# Patient Record
Sex: FEMALE | Race: WHITE | NOT HISPANIC OR LATINO | Employment: OTHER | URBAN - METROPOLITAN AREA
[De-identification: names, ages, dates, MRNs, and addresses within clinical notes are randomized per-mention and may not be internally consistent; named-entity substitution may affect disease eponyms.]

---

## 2022-07-17 LAB — VITAMIN D 25-HYDROXY: 16 NG/ML

## 2023-02-21 LAB — URINE CULTURE: NORMAL

## 2023-03-03 PROBLEM — N39.0 UTI (URINARY TRACT INFECTION): Status: ACTIVE | Noted: 2023-03-03

## 2023-03-03 PROBLEM — R09.89 BILATERAL CAROTID BRUITS: Status: ACTIVE | Noted: 2023-03-03

## 2023-03-03 PROBLEM — E04.1 THYROID NODULE: Status: ACTIVE | Noted: 2023-03-03

## 2023-03-03 PROBLEM — Z95.2 S/P AVR: Status: ACTIVE | Noted: 2023-03-03

## 2023-03-03 PROBLEM — G45.3 AMAUROSIS FUGAX: Status: ACTIVE | Noted: 2023-03-03

## 2023-03-03 PROBLEM — I73.9 PERIPHERAL VASCULAR DISEASE (CMS-HCC): Status: ACTIVE | Noted: 2023-03-03

## 2023-03-03 PROBLEM — I25.10 2-VESSEL CORONARY ARTERY DISEASE: Status: ACTIVE | Noted: 2023-03-03

## 2023-03-03 PROBLEM — E03.4 HYPOTHYROIDISM DUE TO ACQUIRED ATROPHY OF THYROID: Status: ACTIVE | Noted: 2023-03-03

## 2023-03-03 PROBLEM — I10 ESSENTIAL HYPERTENSION: Status: ACTIVE | Noted: 2023-03-03

## 2023-03-03 PROBLEM — I35.0 AORTIC VALVE STENOSIS: Status: ACTIVE | Noted: 2023-03-03

## 2023-03-03 PROBLEM — R73.9 HYPERGLYCEMIA: Status: ACTIVE | Noted: 2023-03-03

## 2023-03-03 PROBLEM — S72.001A CLOSED FRACTURE OF RIGHT HIP (MULTI): Status: ACTIVE | Noted: 2023-03-03

## 2023-03-03 PROBLEM — I48.0 PAROXYSMAL ATRIAL FIBRILLATION (MULTI): Status: ACTIVE | Noted: 2023-03-03

## 2023-03-03 PROBLEM — M75.30 CALCIFIC SUPRASPINATUS TENDINITIS: Status: ACTIVE | Noted: 2023-03-03

## 2023-03-03 PROBLEM — I65.21 ASYMPTOMATIC STENOSIS OF RIGHT CAROTID ARTERY: Status: ACTIVE | Noted: 2023-03-03

## 2023-03-03 PROBLEM — I20.9 ANGINA, CLASS IV (CMS-HCC): Status: ACTIVE | Noted: 2023-03-03

## 2023-03-03 PROBLEM — M12.811 ROTATOR CUFF ARTHROPATHY OF RIGHT SHOULDER: Status: ACTIVE | Noted: 2023-03-03

## 2023-03-03 PROBLEM — D64.9 ANEMIA, NORMOCYTIC NORMOCHROMIC: Status: ACTIVE | Noted: 2023-03-03

## 2023-03-03 PROBLEM — E55.9 VITAMIN D DEFICIENCY: Status: ACTIVE | Noted: 2023-03-03

## 2023-03-03 PROBLEM — R53.81 DEBILITY: Status: ACTIVE | Noted: 2023-03-03

## 2023-03-03 PROBLEM — Z95.1 S/P CABG (CORONARY ARTERY BYPASS GRAFT): Status: ACTIVE | Noted: 2023-03-03

## 2023-03-03 PROBLEM — R27.0 ATAXIA: Status: ACTIVE | Noted: 2023-03-03

## 2023-03-03 PROBLEM — R41.3 MEMORY LOSS: Status: ACTIVE | Noted: 2023-03-03

## 2023-03-03 PROBLEM — N18.31 STAGE 3A CHRONIC KIDNEY DISEASE (MULTI): Status: ACTIVE | Noted: 2023-03-03

## 2023-03-03 PROBLEM — I35.1 AORTIC VALVE REGURGITATION, NONRHEUMATIC: Status: ACTIVE | Noted: 2023-03-03

## 2023-03-03 PROBLEM — E78.2 MIXED HYPERLIPIDEMIA: Status: ACTIVE | Noted: 2023-03-03

## 2023-03-03 PROBLEM — N95.2 ATROPHY OF VAGINA: Status: ACTIVE | Noted: 2023-03-03

## 2023-03-03 RX ORDER — ATORVASTATIN CALCIUM 40 MG/1
1 TABLET, FILM COATED ORAL DAILY
COMMUNITY
Start: 2022-02-28 | End: 2023-10-30 | Stop reason: SDUPTHER

## 2023-03-03 RX ORDER — ASPIRIN 81 MG/1
1 TABLET ORAL DAILY
COMMUNITY
End: 2024-05-17 | Stop reason: WASHOUT

## 2023-03-03 RX ORDER — NITROGLYCERIN 0.4 MG/1
TABLET SUBLINGUAL
COMMUNITY
Start: 2014-08-22

## 2023-03-03 RX ORDER — AMLODIPINE BESYLATE 10 MG/1
1 TABLET ORAL DAILY
COMMUNITY
End: 2024-05-17 | Stop reason: SDUPTHER

## 2023-03-03 RX ORDER — METOPROLOL SUCCINATE 50 MG/1
1 TABLET, EXTENDED RELEASE ORAL 2 TIMES DAILY
COMMUNITY
End: 2023-10-30 | Stop reason: SDUPTHER

## 2023-03-03 RX ORDER — LOSARTAN POTASSIUM AND HYDROCHLOROTHIAZIDE 25; 100 MG/1; MG/1
1 TABLET ORAL DAILY
COMMUNITY

## 2023-03-03 RX ORDER — WARFARIN 2.5 MG/1
TABLET ORAL
COMMUNITY
End: 2023-12-08

## 2023-03-03 RX ORDER — LEVOTHYROXINE SODIUM 75 UG/1
1 TABLET ORAL DAILY
COMMUNITY
End: 2023-06-30 | Stop reason: ALTCHOICE

## 2023-03-10 ENCOUNTER — OFFICE VISIT (OUTPATIENT)
Dept: PRIMARY CARE | Facility: CLINIC | Age: 88
End: 2023-03-10
Payer: COMMERCIAL

## 2023-03-10 VITALS
HEART RATE: 62 BPM | RESPIRATION RATE: 20 BRPM | DIASTOLIC BLOOD PRESSURE: 76 MMHG | SYSTOLIC BLOOD PRESSURE: 140 MMHG | HEIGHT: 62 IN | OXYGEN SATURATION: 99 % | WEIGHT: 158 LBS | BODY MASS INDEX: 29.08 KG/M2

## 2023-03-10 DIAGNOSIS — F05 SUNDOWNING: ICD-10-CM

## 2023-03-10 DIAGNOSIS — R41.89 COGNITIVE DECLINE: Primary | ICD-10-CM

## 2023-03-10 DIAGNOSIS — N30.00 ACUTE CYSTITIS WITHOUT HEMATURIA: ICD-10-CM

## 2023-03-10 DIAGNOSIS — D50.9 IRON DEFICIENCY ANEMIA, UNSPECIFIED IRON DEFICIENCY ANEMIA TYPE: ICD-10-CM

## 2023-03-10 DIAGNOSIS — N18.31 CHRONIC KIDNEY DISEASE, STAGE 3A (MULTI): ICD-10-CM

## 2023-03-10 DIAGNOSIS — E03.4 HYPOTHYROIDISM DUE TO ACQUIRED ATROPHY OF THYROID: ICD-10-CM

## 2023-03-10 DIAGNOSIS — R41.0 DISORIENTATION: ICD-10-CM

## 2023-03-10 PROCEDURE — 1159F MED LIST DOCD IN RCRD: CPT | Performed by: INTERNAL MEDICINE

## 2023-03-10 PROCEDURE — 99214 OFFICE O/P EST MOD 30 MIN: CPT | Performed by: INTERNAL MEDICINE

## 2023-03-10 PROCEDURE — 3077F SYST BP >= 140 MM HG: CPT | Performed by: INTERNAL MEDICINE

## 2023-03-10 PROCEDURE — 3078F DIAST BP <80 MM HG: CPT | Performed by: INTERNAL MEDICINE

## 2023-03-10 PROCEDURE — 1036F TOBACCO NON-USER: CPT | Performed by: INTERNAL MEDICINE

## 2023-03-10 PROCEDURE — 1157F ADVNC CARE PLAN IN RCRD: CPT | Performed by: INTERNAL MEDICINE

## 2023-03-10 RX ORDER — DONEPEZIL HYDROCHLORIDE 5 MG/1
5 TABLET, FILM COATED ORAL NIGHTLY
Qty: 30 TABLET | Refills: 5 | Status: SHIPPED | OUTPATIENT
Start: 2023-03-10 | End: 2023-04-24 | Stop reason: SINTOL

## 2023-03-10 RX ORDER — SERTRALINE HYDROCHLORIDE 25 MG/1
25 TABLET, FILM COATED ORAL DAILY
Qty: 30 TABLET | Refills: 1 | Status: SHIPPED | OUTPATIENT
Start: 2023-03-10 | End: 2023-04-24 | Stop reason: SDUPTHER

## 2023-03-10 NOTE — PATIENT INSTRUCTIONS
Thank you very much for coming.  I am very happy to see you.    Thank you very much for having your laboratory examinations done.  Everything came back stable.  Your urine was suggestive of a urinary tract infection, but since you have had no symptoms, we will just reevaluate you when you return in 6 weeks.  In the meantime, please drink lots of fluids throughout the day, and please urinate every 4 hours while awake.    Likewise, I am happy to hear that you have continued to try to drink more and more fluids!  Please continue your efforts!    You do have a mild ANEMIA perhaps, but it is very mild, and likely has something to do with iron stores being low.  I will recheck again in 6 weeks.  Call me sooner if you notice any blood in your urine or your stool, or if you bruise easily, or if you are having gum or nose bleeding.    I do understand that from time to time, you become a little forgetful, and it may take 2 or 3 days before you remember what you are trying to say.  STILL, you have very good insight as to what is happening, and you are being very PATIENT with yourself.      The next best step is to put you on some medicine to protect your memory!  SERTRALINE will be a very good starting medication for you.  25 mg with SUPPER every evening.  At this dose, there should be no side effects.    Although also, we will get you started on ARICEPT/DONEPEZIL, which is the classic medication for Alzheimer type memory loss.  Please take a 5 mg tablet at bedtime.  It may cause sleepiness, and that is why you will take it at this time.    Please come back in 6 weeks.  Please repeat NONFASTING laboratory examinations, then see me soon after.  Until then, please continue to take care of yourself and your family and each other.  Please continue to pray for our recovery from this pandemic.  I hope you have a blessed Lent and a happy Easter!            0  Looking for any reversible causes of cognitive decline.  Without any  identifiable cause, the patient is presumptively treated as Alzheimer type dementia, and although she is not depressive, and even as she has good insight as to how she is declining.  She will benefit from medications.  She also has been very patient with herself, and as a bonus, her son has taken her in to his own home.    Plans as above.  Return in 6 weeks.  40 minutes please.  Repeat NONFASTING laboratory examination, then Medicare Wellness evaluation.  Reassess mood, energy, function, cognition, rule out self-neglect, caregiver stress.  Review preventive strategies, cardiovascular risk.  Consider referral to NEUROLOGY.  Consider anatomical studies.            0

## 2023-03-10 NOTE — PROGRESS NOTES
"Subjective   Patient ID: Shona Everett is a 87 y.o. female who presents for Follow-up (Patient is here follow up.).    HPI   CONSTITUTIONALLY, no fever, no chills.  No night sweats.  No lingering anorexia or nausea.  No apparent lymphadenopathy.  No apparent weight loss.     Review of Systems  Review of systems as in history of present illness, and otherwise, reviewed separately as well, and was unremarkable/negative/noncontributory.         Objective   /76 (BP Location: Left arm, Patient Position: Sitting, BP Cuff Size: Adult)   Pulse 62   Resp 20   Ht 1.575 m (5' 2\")   Wt 71.7 kg (158 lb)   SpO2 99%   BMI 28.90 kg/m²     Physical Exam  HEAD pink palpebral conjunctivae, anicteric sclerae.  NECK supple, no apparent jugular venous distention.  CARDIOVASCULAR not in distress or diaphoresis.  No bipedal edema.  LUNGS not in distress or diaphoresis.  Not using accessory muscles.  ABDOMEN soft, nontender.  BACK no costovertebral angle tenderness.  EXTREMITIES no clubbing, no cyanosis.  NEURO no facial asymmetry.  No apparent cranial nerve deficits.  Romberg negative.  Ambulating without need of assistance.  No apparent focal weakness.  No tremors.  PSYCH receptive, appropriate, and eager to maintain and improve quality of life.     Assessment/Plan   Problem List Items Addressed This Visit          Genitourinary    UTI (urinary tract infection)    Relevant Orders    Urinalysis with Reflex Microscopic and Culture       Endocrine/Metabolic    Hypothyroidism due to acquired atrophy of thyroid    Relevant Orders    TSH with reflex to Free T4 if abnormal     Other Visit Diagnoses       Cognitive decline    -  Primary    Relevant Medications    sertraline (Zoloft) 25 mg tablet    donepezil (Aricept) 5 mg tablet    Disorientation        Relevant Medications    sertraline (Zoloft) 25 mg tablet    donepezil (Aricept) 5 mg tablet    Sundowning        Relevant Medications    sertraline (Zoloft) 25 mg tablet    donepezil " (Aricept) 5 mg tablet    Iron deficiency anemia, unspecified iron deficiency anemia type        Relevant Orders    CBC and Auto Differential    Chronic kidney disease, stage 3a        Relevant Orders    Comprehensive Metabolic Panel    Creatine Kinase             Patient is here for follow up - lab review.

## 2023-03-13 ENCOUNTER — APPOINTMENT (OUTPATIENT)
Dept: PRIMARY CARE | Facility: CLINIC | Age: 88
End: 2023-03-13
Payer: COMMERCIAL

## 2023-04-10 DIAGNOSIS — N39.0 RECURRENT UTI: Primary | ICD-10-CM

## 2023-04-11 LAB
INR IN PPP BY COAGULATION ASSAY: 1.8 (ref 0.9–1.1)
PROTHROMBIN TIME (PT) IN PPP BY COAGULATION ASSAY: 21.1 SEC (ref 9.8–13.4)

## 2023-04-12 ENCOUNTER — LAB (OUTPATIENT)
Dept: LAB | Facility: LAB | Age: 88
End: 2023-04-12
Payer: COMMERCIAL

## 2023-04-12 ENCOUNTER — TELEPHONE (OUTPATIENT)
Dept: PRIMARY CARE | Facility: CLINIC | Age: 88
End: 2023-04-12

## 2023-04-12 DIAGNOSIS — N39.0 RECURRENT UTI: ICD-10-CM

## 2023-04-12 DIAGNOSIS — N30.00 ACUTE CYSTITIS WITHOUT HEMATURIA: ICD-10-CM

## 2023-04-12 LAB
APPEARANCE, URINE: ABNORMAL
BILIRUBIN, URINE: NEGATIVE
BLOOD, URINE: NEGATIVE
COLOR, URINE: YELLOW
GLUCOSE, URINE: NEGATIVE MG/DL
HYALINE CASTS, URINE: ABNORMAL /LPF
KETONES, URINE: NEGATIVE MG/DL
LEUKOCYTE ESTERASE, URINE: ABNORMAL
MUCUS, URINE: ABNORMAL /LPF
NITRITE, URINE: NEGATIVE
PH, URINE: 7 (ref 5–8)
PROTEIN, URINE: NEGATIVE MG/DL
RBC, URINE: 1 /HPF (ref 0–5)
SPECIFIC GRAVITY, URINE: 1.01 (ref 1–1.03)
SQUAMOUS EPITHELIAL CELLS, URINE: 3 /HPF
UROBILINOGEN, URINE: <2 MG/DL (ref 0–1.9)
WBC, URINE: 12 /HPF (ref 0–5)

## 2023-04-12 PROCEDURE — 81001 URINALYSIS AUTO W/SCOPE: CPT

## 2023-04-12 PROCEDURE — 87086 URINE CULTURE/COLONY COUNT: CPT

## 2023-04-14 ENCOUNTER — APPOINTMENT (OUTPATIENT)
Dept: PRIMARY CARE | Facility: CLINIC | Age: 88
End: 2023-04-14
Payer: COMMERCIAL

## 2023-04-15 LAB — URINE CULTURE: NORMAL

## 2023-04-24 ENCOUNTER — OFFICE VISIT (OUTPATIENT)
Dept: PRIMARY CARE | Facility: CLINIC | Age: 88
End: 2023-04-24
Payer: COMMERCIAL

## 2023-04-24 VITALS
RESPIRATION RATE: 20 BRPM | WEIGHT: 152.5 LBS | SYSTOLIC BLOOD PRESSURE: 137 MMHG | DIASTOLIC BLOOD PRESSURE: 68 MMHG | BODY MASS INDEX: 28.06 KG/M2 | HEART RATE: 62 BPM | HEIGHT: 62 IN | OXYGEN SATURATION: 98 %

## 2023-04-24 DIAGNOSIS — E03.4 HYPOTHYROIDISM DUE TO ACQUIRED ATROPHY OF THYROID: ICD-10-CM

## 2023-04-24 DIAGNOSIS — N39.0 RECURRENT UTI: ICD-10-CM

## 2023-04-24 DIAGNOSIS — R73.9 HYPERGLYCEMIA: ICD-10-CM

## 2023-04-24 DIAGNOSIS — I10 ESSENTIAL HYPERTENSION: ICD-10-CM

## 2023-04-24 DIAGNOSIS — R41.89 COGNITIVE DECLINE: ICD-10-CM

## 2023-04-24 DIAGNOSIS — R41.0 DISORIENTATION: ICD-10-CM

## 2023-04-24 DIAGNOSIS — E55.9 VITAMIN D DEFICIENCY: ICD-10-CM

## 2023-04-24 DIAGNOSIS — F05 SUNDOWNING: ICD-10-CM

## 2023-04-24 DIAGNOSIS — D64.9 ANEMIA, NORMOCYTIC NORMOCHROMIC: ICD-10-CM

## 2023-04-24 DIAGNOSIS — Z00.00 ROUTINE GENERAL MEDICAL EXAMINATION AT HEALTH CARE FACILITY: Primary | ICD-10-CM

## 2023-04-24 DIAGNOSIS — R41.0 DELIRIUM: ICD-10-CM

## 2023-04-24 DIAGNOSIS — E78.2 MIXED HYPERLIPIDEMIA: ICD-10-CM

## 2023-04-24 PROCEDURE — 1157F ADVNC CARE PLAN IN RCRD: CPT | Performed by: INTERNAL MEDICINE

## 2023-04-24 PROCEDURE — 1170F FXNL STATUS ASSESSED: CPT | Performed by: INTERNAL MEDICINE

## 2023-04-24 PROCEDURE — 1123F ACP DISCUSS/DSCN MKR DOCD: CPT | Performed by: INTERNAL MEDICINE

## 2023-04-24 PROCEDURE — 1036F TOBACCO NON-USER: CPT | Performed by: INTERNAL MEDICINE

## 2023-04-24 PROCEDURE — 3078F DIAST BP <80 MM HG: CPT | Performed by: INTERNAL MEDICINE

## 2023-04-24 PROCEDURE — G0439 PPPS, SUBSEQ VISIT: HCPCS | Performed by: INTERNAL MEDICINE

## 2023-04-24 PROCEDURE — 1159F MED LIST DOCD IN RCRD: CPT | Performed by: INTERNAL MEDICINE

## 2023-04-24 PROCEDURE — 99214 OFFICE O/P EST MOD 30 MIN: CPT | Performed by: INTERNAL MEDICINE

## 2023-04-24 PROCEDURE — 1160F RVW MEDS BY RX/DR IN RCRD: CPT | Performed by: INTERNAL MEDICINE

## 2023-04-24 PROCEDURE — 3075F SYST BP GE 130 - 139MM HG: CPT | Performed by: INTERNAL MEDICINE

## 2023-04-24 RX ORDER — SERTRALINE HYDROCHLORIDE 25 MG/1
25 TABLET, FILM COATED ORAL DAILY
Qty: 30 TABLET | Refills: 1 | Status: SHIPPED | OUTPATIENT
Start: 2023-04-24 | End: 2023-06-30 | Stop reason: SDUPTHER

## 2023-04-24 ASSESSMENT — ENCOUNTER SYMPTOMS
LOSS OF SENSATION IN FEET: 0
OCCASIONAL FEELINGS OF UNSTEADINESS: 0
DEPRESSION: 0

## 2023-04-24 ASSESSMENT — PATIENT HEALTH QUESTIONNAIRE - PHQ9
1. LITTLE INTEREST OR PLEASURE IN DOING THINGS: NOT AT ALL
2. FEELING DOWN, DEPRESSED OR HOPELESS: NOT AT ALL
SUM OF ALL RESPONSES TO PHQ9 QUESTIONS 1 AND 2: 0

## 2023-04-24 ASSESSMENT — ACTIVITIES OF DAILY LIVING (ADL)
DOING_HOUSEWORK: NEEDS ASSISTANCE
DRESSING: INDEPENDENT
BATHING: INDEPENDENT
MANAGING_FINANCES: TOTAL CARE
GROCERY_SHOPPING: TOTAL CARE
TAKING_MEDICATION: NEEDS ASSISTANCE

## 2023-04-24 NOTE — ADDENDUM NOTE
Addended by: DORINDA GAONA on: 4/24/2023 12:50 PM     Modules accepted: Orders, Level of Service

## 2023-04-24 NOTE — PATIENT INSTRUCTIONS
Thank you very much for coming.  It is indeed very nice to see you.    We did your Medicare Wellness evaluation today, and you are doing very well!  Please continue taking your SERTRALINE/ZOLOFT with SUPPER every evening.  No DONEPEZIL/ARICEPT for now.  We can wait until restarting this medication again.    Please continue taking it only SHORT NAPS during the daytime.  Limit yourself to 20 minutes or less.  This way, your brain can be refreshed, but will not be confused.  When you get your days and nights mixed, sometimes, your memory also gets challenged.    Thank you for staying physically active.  Please continue using your wheeled WALKER.  I am glad to hear that it gives you CONFIDENCE to get around and about!    Please do some FASTING laboratory examinations anytime you are scheduled for your next blood draw.  You can have it done at the [facility] [place].  Let me send word regarding results and possible changes.    Please continue to take care of yourself and your family, and please continue to pray for our recovery from this pandemic.     We reviewed your LIVING WILL wishes, and we will continue to have your SON, Delfino, as your DURABLE POWER OF  for healthcare matters.  This means that whenever there is a decision that needs to be done regarding your health, and if you are unable to answer for yourself, he will be the first person for us to ask!    Also, you have expressed desire for us to resuscitate you with CPR in the event that you are close to death.  Of course, we will continue to also monitor your overall quality of life, and we will continue to discuss your options either with you, or with your son if you are unable to answer.    I hope you had a good Easter, and I do wish you a happy spring and summertime!  See you in 2 months.            0  Return in 2 months.  40 minutes please.  Reassess mood, energy, function, cardiovascular risk.  Coordinate with cardiology.  Reassess preventive strategies,  rule out caregiver stress.  Review fasting laboratory results.            0

## 2023-04-24 NOTE — PROGRESS NOTES
"Subjective   Reason for Visit: Shona Everett is an 87 y.o. female here for a Medicare Wellness visit.     Past Medical, Surgical, and Family History reviewed and updated in chart.    Reviewed all medications by prescribing practitioner or clinical pharmacist (such as prescriptions, OTCs, herbal therapies and supplements) and documented in the medical record.    HPI  The patient is here for Medicare Wellness evaluation, review of preventive strategies.  In the interim, started on SERTRALINE, as well as DONEPEZIL.  Perhaps some acute paradoxical response, with the patient having increased confusion, unable to recognize people that she has been exposed to for over 60 years, including her son.  Donepezil stopped, and sertraline continued.  The patient has since returned to baseline, and even has had less daytime sleepiness, and has been taking less naps during the daytime.    The patient herself is not concerned.  No headache, blurred vision, diplopia.  No dysphagia.  Ambulating with the use of a wheeled walker, and the patient herself is very happy about how she is doing.  No recent falls.  Improved left shoulder function.  Continues to try to do physical therapy, but does not always remember her regimens.  Still, denies depression, stating, \"I got people that love me.  They make me feel part of the family.  I can take on visitors.  I feel like I am just at home.\"  Indeed, the patient feels that she has the benefit of company to make sure that she stays safe and healthy, but at the same time, she is allowed the independence that she is used to.    States that she uses her wheeled walker \"all the time... It gives me freedom that I can do anything.\"  Compliant with medications, tolerating regimens.  Denies chest pain, palpitations, orthopnea, paroxysmal nocturnal dyspnea.  Stays physically active.  Careful about exposure.  No coughing, no sputum production.  Appetite preserved, no abdominal distress.  Currently, no " "dysuria, flank, suprapubic pain.  Likewise, no gum or nose bleeding.  No easy bruisability.  No apparent blood in urine or stool.  CONSTITUTIONALLY, no fever, no chills.  No night sweats.  No lingering anorexia or nausea.  No apparent lymphadenopathy.  No apparent weight loss.    CODE STATUS reviewed, please see.    Patient Care Team:  Mike Ashford MD as PCP - General         Review of Systems  Review of systems as in history of present illness, and otherwise, reviewed separately as well, and was unremarkable/negative/noncontributory.      Objective   Vitals:  /68 (BP Location: Left arm, Patient Position: Sitting, BP Cuff Size: Adult)   Pulse 62   Resp 20   Ht 1.575 m (5' 2\")   Wt 69.2 kg (152 lb 8 oz)   SpO2 98%   BMI 27.89 kg/m²       Physical Exam  In seemingly good spirits.  Not in distress or diaphoresis.  Receptive, oriented to person and place.  Not showing signs of frustration.  Moderately built, not cachectic.  Not unkempt.  Appropriate.  Continues to want to stay healthy, independent, and productive.  Does not wish harm to self or others.    HEAD pink palpebral conjunctivae, anicteric sclerae.  NECK supple, no apparent jugular venous distention.  CARDIOVASCULAR not in distress or diaphoresis.  No bipedal edema.  LUNGS not in distress or diaphoresis.  Not using accessory muscles.  ABDOMEN soft, nontender.  BACK no costovertebral angle tenderness.  EXTREMITIES no clubbing, no cyanosis.  SKIN with no breakdown, bleeding, jaundice.  NEURO no facial asymmetry.  No apparent cranial nerve deficits.  Ambulating with the use of a wheeled walker, and is very confident about her position in space.  No apparent focal weakness.  No tremors.  PSYCH receptive, appropriate, and eager to maintain and improve quality of life.         Assessment/Plan   Problem List Items Addressed This Visit          Nervous    Delirium       Circulatory    Essential hypertension    Relevant Orders    Magnesium    TSH " with reflex to Free T4 if abnormal       Genitourinary    Recurrent UTI    Relevant Orders    CBC and Auto Differential    Urinalysis with Reflex Microscopic and Culture       Endocrine/Metabolic    Vitamin D deficiency    Hypothyroidism due to acquired atrophy of thyroid       Hematologic    Anemia, normocytic normochromic    Relevant Orders    Vitamin B12    Folate    Ferritin    Iron and TIBC    Urinalysis with Reflex Microscopic and Culture       Other    Mixed hyperlipidemia    Relevant Orders    Comprehensive Metabolic Panel    Lipid Panel    Hyperglycemia    Relevant Orders    Hemoglobin A1C    Cognitive decline    Relevant Medications    sertraline (Zoloft) 25 mg tablet     Other Visit Diagnoses       Routine general medical examination at health care facility    -  Primary    Disorientation        Relevant Medications    sertraline (Zoloft) 25 mg tablet    Sundowning        Relevant Medications    sertraline (Zoloft) 25 mg tablet             Thank you very much for coming.  It is indeed very nice to see you.    We did your Medicare Wellness evaluation today, and you are doing very well!  Please continue taking your SERTRALINE/ZOLOFT with SUPPER every evening.  No DONEPEZIL/ARICEPT for now.  We can wait until restarting this medication again.    Please continue taking it only SHORT NAPS during the daytime.  Limit yourself to 20 minutes or less.  This way, your brain can be refreshed, but will not be confused.  When you get your days and nights mixed, sometimes, your memory also gets challenged.    Thank you for staying physically active.  Please continue using your wheeled WALKER.  I am glad to hear that it gives you CONFIDENCE to get around and about!    Please do some FASTING laboratory examinations anytime you are scheduled for your next blood draw.  [facility] can have [facility] done at the [facility].  Let me send word regarding results and possible changes.    Please continue to take care of yourself and your  family, and please continue to pray for our recovery from this pandemic.     We reviewed your LIVING WILL wishes, and we will continue to have your SON, Delfino, as your DURABLE POWER OF  for healthcare matters.  This means that whenever there is a decision that needs to be done regarding your health, and if you are unable to answer for yourself, he will be the first person for us to ask!    Also, you have expressed desire for us to resuscitate you with CPR in the event that you are close to death.  Of course, we will continue to also monitor your overall quality of life, and we will continue to discuss your options either with you, or with your son if you are unable to answer.    I hope you had a good Easter, and I do wish you a happy spring and summertime!  See you in 2 months.            0  Return in 2 months.  40 minutes please.  Reassess mood, energy, function, cardiovascular risk.  Coordinate with cardiology.  Reassess preventive strategies, rule out caregiver stress.  Review fasting laboratory results.            0

## 2023-06-24 ENCOUNTER — LAB (OUTPATIENT)
Dept: LAB | Facility: LAB | Age: 88
End: 2023-06-24
Payer: COMMERCIAL

## 2023-06-24 DIAGNOSIS — N39.0 RECURRENT UTI: ICD-10-CM

## 2023-06-24 DIAGNOSIS — E78.2 MIXED HYPERLIPIDEMIA: ICD-10-CM

## 2023-06-24 DIAGNOSIS — R73.9 HYPERGLYCEMIA: ICD-10-CM

## 2023-06-24 DIAGNOSIS — D64.9 ANEMIA, NORMOCYTIC NORMOCHROMIC: ICD-10-CM

## 2023-06-24 DIAGNOSIS — I10 ESSENTIAL HYPERTENSION: ICD-10-CM

## 2023-06-24 LAB
ALANINE AMINOTRANSFERASE (SGPT) (U/L) IN SER/PLAS: 14 U/L (ref 7–45)
ALBUMIN (G/DL) IN SER/PLAS: 4.1 G/DL (ref 3.4–5)
ALKALINE PHOSPHATASE (U/L) IN SER/PLAS: 55 U/L (ref 33–136)
ANION GAP IN SER/PLAS: 14 MMOL/L (ref 10–20)
APPEARANCE, URINE: CLEAR
ASPARTATE AMINOTRANSFERASE (SGOT) (U/L) IN SER/PLAS: 20 U/L (ref 9–39)
BACTERIA, URINE: ABNORMAL /HPF
BASOPHILS (10*3/UL) IN BLOOD BY AUTOMATED COUNT: 0.07 X10E9/L (ref 0–0.1)
BASOPHILS/100 LEUKOCYTES IN BLOOD BY AUTOMATED COUNT: 1 % (ref 0–2)
BILIRUBIN TOTAL (MG/DL) IN SER/PLAS: 0.5 MG/DL (ref 0–1.2)
BILIRUBIN, URINE: NEGATIVE
BLOOD, URINE: NEGATIVE
CALCIDIOL (25 OH VITAMIN D3) (NG/ML) IN SER/PLAS: 53 NG/ML
CALCIUM (MG/DL) IN SER/PLAS: 9.3 MG/DL (ref 8.6–10.3)
CARBON DIOXIDE, TOTAL (MMOL/L) IN SER/PLAS: 31 MMOL/L (ref 21–32)
CHLORIDE (MMOL/L) IN SER/PLAS: 100 MMOL/L (ref 98–107)
CHOLESTEROL (MG/DL) IN SER/PLAS: 144 MG/DL (ref 0–199)
CHOLESTEROL IN HDL (MG/DL) IN SER/PLAS: 51.9 MG/DL
CHOLESTEROL/HDL RATIO: 2.8
COBALAMIN (VITAMIN B12) (PG/ML) IN SER/PLAS: 584 PG/ML (ref 211–911)
COLOR, URINE: ABNORMAL
CREATININE (MG/DL) IN SER/PLAS: 0.88 MG/DL (ref 0.5–1.05)
EOSINOPHILS (10*3/UL) IN BLOOD BY AUTOMATED COUNT: 0.48 X10E9/L (ref 0–0.4)
EOSINOPHILS/100 LEUKOCYTES IN BLOOD BY AUTOMATED COUNT: 6.6 % (ref 0–6)
ERYTHROCYTE DISTRIBUTION WIDTH (RATIO) BY AUTOMATED COUNT: 14.2 % (ref 11.5–14.5)
ERYTHROCYTE MEAN CORPUSCULAR HEMOGLOBIN CONCENTRATION (G/DL) BY AUTOMATED: 32.8 G/DL (ref 32–36)
ERYTHROCYTE MEAN CORPUSCULAR VOLUME (FL) BY AUTOMATED COUNT: 89 FL (ref 80–100)
ERYTHROCYTES (10*6/UL) IN BLOOD BY AUTOMATED COUNT: 4.03 X10E12/L (ref 4–5.2)
ESTIMATED AVERAGE GLUCOSE FOR HBA1C: 120 MG/DL
FERRITIN (UG/LL) IN SER/PLAS: 54 UG/L (ref 8–150)
FOLATE (NG/ML) IN SER/PLAS: >22.3 NG/ML
GFR FEMALE: 64 ML/MIN/1.73M2
GLUCOSE (MG/DL) IN SER/PLAS: 93 MG/DL (ref 74–99)
GLUCOSE, URINE: NEGATIVE MG/DL
HEMATOCRIT (%) IN BLOOD BY AUTOMATED COUNT: 36 % (ref 36–46)
HEMOGLOBIN (G/DL) IN BLOOD: 11.8 G/DL (ref 12–16)
HEMOGLOBIN A1C/HEMOGLOBIN TOTAL IN BLOOD: 5.8 %
IMMATURE GRANULOCYTES/100 LEUKOCYTES IN BLOOD BY AUTOMATED COUNT: 0.3 % (ref 0–0.9)
INR BLD: 2 (ref 0.9–1.1)
INR IN PPP BY COAGULATION ASSAY: 2 (ref 0.9–1.1)
IRON (UG/DL) IN SER/PLAS: 80 UG/DL (ref 35–150)
IRON BINDING CAPACITY (UG/DL) IN SER/PLAS: 348 UG/DL (ref 240–445)
IRON SATURATION (%) IN SER/PLAS: 23 % (ref 25–45)
KETONES, URINE: NEGATIVE MG/DL
LDL: 76 MG/DL (ref 0–99)
LEUKOCYTE ESTERASE, URINE: ABNORMAL
LEUKOCYTES (10*3/UL) IN BLOOD BY AUTOMATED COUNT: 7.3 X10E9/L (ref 4.4–11.3)
LYMPHOCYTES (10*3/UL) IN BLOOD BY AUTOMATED COUNT: 1.27 X10E9/L (ref 0.8–3)
LYMPHOCYTES/100 LEUKOCYTES IN BLOOD BY AUTOMATED COUNT: 17.4 % (ref 13–44)
MAGNESIUM (MG/DL) IN SER/PLAS: 1.59 MG/DL (ref 1.6–2.4)
MONOCYTES (10*3/UL) IN BLOOD BY AUTOMATED COUNT: 0.77 X10E9/L (ref 0.05–0.8)
MONOCYTES/100 LEUKOCYTES IN BLOOD BY AUTOMATED COUNT: 10.5 % (ref 2–10)
MUCUS, URINE: ABNORMAL /LPF
NEUTROPHILS (10*3/UL) IN BLOOD BY AUTOMATED COUNT: 4.7 X10E9/L (ref 1.6–5.5)
NEUTROPHILS/100 LEUKOCYTES IN BLOOD BY AUTOMATED COUNT: 64.2 % (ref 40–80)
NITRITE, URINE: NEGATIVE
PH, URINE: 7 (ref 5–8)
PLATELETS (10*3/UL) IN BLOOD AUTOMATED COUNT: 213 X10E9/L (ref 150–450)
POTASSIUM (MMOL/L) IN SER/PLAS: 3.8 MMOL/L (ref 3.5–5.3)
PROTEIN TOTAL: 6.6 G/DL (ref 6.4–8.2)
PROTEIN, URINE: NEGATIVE MG/DL
PROTHROMBIN TIME (PT) IN PPP BY COAGULATION ASSAY: 22.8 SEC (ref 9.8–12.8)
PROTHROMBIN TIME: 22.8 SEC (ref 9.8–12.8)
RBC, URINE: <1 /HPF (ref 0–5)
SODIUM (MMOL/L) IN SER/PLAS: 141 MMOL/L (ref 136–145)
SPECIFIC GRAVITY, URINE: 1.01 (ref 1–1.03)
SQUAMOUS EPITHELIAL CELLS, URINE: 1 /HPF
THYROTROPIN (MIU/L) IN SER/PLAS BY DETECTION LIMIT <= 0.05 MIU/L: 4.12 MIU/L (ref 0.44–3.98)
THYROTROPIN (MIU/L) IN SER/PLAS BY DETECTION LIMIT <= 0.05 MIU/L: 4.19 MIU/L (ref 0.44–3.98)
THYROXINE (T4) FREE (NG/DL) IN SER/PLAS: 0.91 NG/DL (ref 0.61–1.12)
THYROXINE (T4) FREE (NG/DL) IN SER/PLAS: 1.07 NG/DL (ref 0.61–1.12)
TRIGLYCERIDE (MG/DL) IN SER/PLAS: 82 MG/DL (ref 0–149)
UREA NITROGEN (MG/DL) IN SER/PLAS: 20 MG/DL (ref 6–23)
UROBILINOGEN, URINE: <2 MG/DL (ref 0–1.9)
VLDL: 16 MG/DL (ref 0–40)
WBC, URINE: 7 /HPF (ref 0–5)

## 2023-06-24 PROCEDURE — 82728 ASSAY OF FERRITIN: CPT

## 2023-06-24 PROCEDURE — 84443 ASSAY THYROID STIM HORMONE: CPT

## 2023-06-24 PROCEDURE — 83540 ASSAY OF IRON: CPT

## 2023-06-24 PROCEDURE — 85025 COMPLETE CBC W/AUTO DIFF WBC: CPT

## 2023-06-24 PROCEDURE — 80061 LIPID PANEL: CPT

## 2023-06-24 PROCEDURE — 87086 URINE CULTURE/COLONY COUNT: CPT

## 2023-06-24 PROCEDURE — 82746 ASSAY OF FOLIC ACID SERUM: CPT

## 2023-06-24 PROCEDURE — 80053 COMPREHEN METABOLIC PANEL: CPT

## 2023-06-24 PROCEDURE — 83036 HEMOGLOBIN GLYCOSYLATED A1C: CPT

## 2023-06-24 PROCEDURE — 83550 IRON BINDING TEST: CPT

## 2023-06-24 PROCEDURE — 36415 COLL VENOUS BLD VENIPUNCTURE: CPT

## 2023-06-24 PROCEDURE — 81001 URINALYSIS AUTO W/SCOPE: CPT

## 2023-06-24 PROCEDURE — 83735 ASSAY OF MAGNESIUM: CPT

## 2023-06-24 PROCEDURE — 84439 ASSAY OF FREE THYROXINE: CPT

## 2023-06-24 PROCEDURE — 82607 VITAMIN B-12: CPT

## 2023-06-27 LAB — URINE CULTURE: NORMAL

## 2023-06-30 ENCOUNTER — OFFICE VISIT (OUTPATIENT)
Dept: PRIMARY CARE | Facility: CLINIC | Age: 88
End: 2023-06-30
Payer: COMMERCIAL

## 2023-06-30 VITALS
WEIGHT: 157.3 LBS | RESPIRATION RATE: 20 BRPM | BODY MASS INDEX: 28.95 KG/M2 | DIASTOLIC BLOOD PRESSURE: 71 MMHG | HEART RATE: 66 BPM | OXYGEN SATURATION: 92 % | SYSTOLIC BLOOD PRESSURE: 126 MMHG | HEIGHT: 62 IN

## 2023-06-30 DIAGNOSIS — F02.80 DEMENTIA OF THE ALZHEIMER'S TYPE WITH LATE ONSET WITHOUT BEHAVIORAL DISTURBANCE (MULTI): ICD-10-CM

## 2023-06-30 DIAGNOSIS — R53.81 DEBILITY: Primary | ICD-10-CM

## 2023-06-30 DIAGNOSIS — E83.42 HYPOMAGNESEMIA: ICD-10-CM

## 2023-06-30 DIAGNOSIS — E55.9 VITAMIN D DEFICIENCY: ICD-10-CM

## 2023-06-30 DIAGNOSIS — R26.0 ATAXIC GAIT: ICD-10-CM

## 2023-06-30 DIAGNOSIS — Z63.6 CAREGIVER STRESS: ICD-10-CM

## 2023-06-30 DIAGNOSIS — R68.89 COLD INTOLERANCE: ICD-10-CM

## 2023-06-30 DIAGNOSIS — G30.1 DEMENTIA OF THE ALZHEIMER'S TYPE WITH LATE ONSET WITHOUT BEHAVIORAL DISTURBANCE (MULTI): ICD-10-CM

## 2023-06-30 DIAGNOSIS — M81.0 AGE-RELATED OSTEOPOROSIS WITHOUT CURRENT PATHOLOGICAL FRACTURE: ICD-10-CM

## 2023-06-30 DIAGNOSIS — E03.4 HYPOTHYROIDISM DUE TO ACQUIRED ATROPHY OF THYROID: ICD-10-CM

## 2023-06-30 PROBLEM — F05 SUNDOWNING: Status: ACTIVE | Noted: 2023-06-30

## 2023-06-30 PROCEDURE — 1159F MED LIST DOCD IN RCRD: CPT | Performed by: INTERNAL MEDICINE

## 2023-06-30 PROCEDURE — 99215 OFFICE O/P EST HI 40 MIN: CPT | Performed by: INTERNAL MEDICINE

## 2023-06-30 PROCEDURE — 1160F RVW MEDS BY RX/DR IN RCRD: CPT | Performed by: INTERNAL MEDICINE

## 2023-06-30 PROCEDURE — 1157F ADVNC CARE PLAN IN RCRD: CPT | Performed by: INTERNAL MEDICINE

## 2023-06-30 PROCEDURE — 3078F DIAST BP <80 MM HG: CPT | Performed by: INTERNAL MEDICINE

## 2023-06-30 PROCEDURE — 1036F TOBACCO NON-USER: CPT | Performed by: INTERNAL MEDICINE

## 2023-06-30 PROCEDURE — 3074F SYST BP LT 130 MM HG: CPT | Performed by: INTERNAL MEDICINE

## 2023-06-30 RX ORDER — LEVOTHYROXINE SODIUM 88 UG/1
TABLET ORAL
Qty: 90 TABLET | Refills: 0 | Status: SHIPPED | OUTPATIENT
Start: 2023-06-30 | End: 2023-10-02

## 2023-06-30 RX ORDER — SERTRALINE HYDROCHLORIDE 25 MG/1
25 TABLET, FILM COATED ORAL DAILY
Qty: 90 TABLET | Refills: 1 | Status: SHIPPED | OUTPATIENT
Start: 2023-06-30 | End: 2023-12-11

## 2023-06-30 RX ORDER — CHOLECALCIFEROL (VITAMIN D3) 1250 MCG
TABLET ORAL
Qty: 13 TABLET | Refills: 3 | Status: SHIPPED | OUTPATIENT
Start: 2023-06-30

## 2023-06-30 RX ORDER — LANOLIN ALCOHOL/MO/W.PET/CERES
CREAM (GRAM) TOPICAL
Qty: 90 TABLET | Refills: 1 | Status: SHIPPED | OUTPATIENT
Start: 2023-06-30 | End: 2023-11-27 | Stop reason: SDUPTHER

## 2023-06-30 SDOH — SOCIAL STABILITY - SOCIAL INSECURITY: DEPENDENT RELATIVE NEEDING CARE AT HOME: Z63.6

## 2023-06-30 NOTE — PATIENT INSTRUCTIONS
Thank you very much for coming.  I am very happy to see you.  I am very happy to hear that you have been doing very well, and that you are very happy to be with Delfino and Cydney!  They are also very happy that you are with them!    I am also glad to hear that you have been doing well with your SERTRALINE/ZOLOFT regimen.  Indeed, you will have a refill of this medication for the next 6 months.    Still, you can always do better.  Let us have NEUROLOGY, Dr. Sanchez, take a look at you.  Also, if you feel you need a little bit more assistance or prodding, please let me know, and I can order physical therapy under HOME CARE to visit you at home.    Thank you for doing your FASTING blood and urine examinations.  Everything came back very good.  Your MAGNESIUM is a little low.  We will just put you on a magnesium supplement once a day, always with food.  Magnesium might cause diarrhea, and to prevent this, you just have to take this magnesium with FOOD every day.    We checked your vitamin D levels, and you will benefit from a VITAMIN D supplement once a week, Sundays, with lunch and a full glass of water.    Because we want to preserve your function and protect you from fractures in the future, we will check how you are doing with your current regimens by doing a BONE DENSITY test.  This is a very easy test, where they will do an ultrasound of your back bone and hip bone, and they will let me know how well you are doing with your bone strength.  Likewise, I will send word with any results and possible changes.    Because you are feeling cold a lot, and because your blood examinations suggest that your thyroid is underperforming, we will adjust your THYROID medicine increase your thyroid dose to 88 mcg.  Take this before breakfast every morning.  Please see Dr. Kruger, ENDOCRINOLOGY, once more, then afterwards, you can tell him that I will take care of your thyroid needs.  I have also checked you for diabetes.  Your marker  for elevated sugar is all normal.  You can eat anything that you want.    Remember, all you have to do is tell us what you want.  It is easier for us to please you if you go ahead and tell us what you want.  You will be doing as a favor when you do this.    You are due for PNEUMONIA vaccination, PREVNAR 20.  Contact pharmacist, and he will be very happy to make arrangements for you.  ALSO, you are due for your SHINGLES vaccination that comes into injections.  Just remember to please take your vaccinations only 1 at that time.  Space out your vaccinations at least 2 weeks apart.    It is okay for you to take naps.  Just keep them under 30 minutes each time.    Also, it is very good for you to stay stimulated.  Make sure that you do some walking at least 3 times a day, 10 minutes each time.  Walking not only stimulates the mind, but also keeps the body healthy, and keep your blood pressure well regulated.  It also helps with your mood!    Please come back in 3 months.  Call me sooner if you think you might benefit from PHYSICAL THERAPY, or if you have any questions or concerns.  I will send word regarding your bone density test results, and any changes that might need to be done.    Please continue to take care of yourself and each other, and please continue to pray for our recovery from this pandemic.  Going to [employer] on Sundays might be a good idea, because it will give you more structure.    Please remember that you are doing us a favor whenever you tell us what you want.  Do not be afraid.  You are with your son and daughter-in-law because they want you there, and because they love you, and they want you to be happy.    See you in 3 months.  Call sooner please as needed.  I hope you have a good summer!            0  Return in 3 months.  40 minutes please.  Reassess debility, rule out caregiver stress.  Coordinate with neurology, cardiology, endocrinology, possibly physical therapy.  Reconsider home care.   Reconsider other psychoactive regimens as needed.            0

## 2023-06-30 NOTE — PROGRESS NOTES
"Subjective   Patient ID: Shona Everett is a 87 y.o. female who presents for Follow-up.    HPI   The patient is here to allow me to reevaluate interval history.  In the past, the patient apparently had rumination, perhaps even agitation, while she was on DONEPEZIL, only low-dose.  Remarkably, symptoms abated almost immediately after this medication was stopped.  SERTRALINE was continued in the hopes that at least 1 psychoactive regimen might help.  Indeed, the patient's \"sundowning\" greatly improved, and the patient was more easily oriented, and more easily directable.  She was able to attend to her own activities of daily living with direction, and she has been more agreeable with her instrumental activities, including taking her medications.  Unfortunately, without any help, she does seem to forget to help herself.  Furthermore, she remains relatively passive, only opening up later on to point out that she would like things done differently, but she has not sent anything.  She seems to take things a little harder when it is between her and her daughter-in-law, Cydney.  I have since been able to discuss this interaction with the son of the patient, Delfino.  He pointed out that in the past, his wife, Cydney, had her mother live with them, and that this perhaps has made her more understanding of the current situation.  FURTHERMORE, the daughter of the patient, sister of Delfino, will be coming to help and alleviate taking care of of the patient 3 days a week.    In the meantime, no belligerence.  No delirium.  No falls.  The patient herself has no complaints, at least initially.  The son/POA Delfino did point out that his mother has had no more sundowning perhaps, but has been taking naps often during the daytime.  He has been trying hard to keep her naps 15 minutes or less, but because of this, she does take quite a number of naps, he states.  Again, otherwise, no falls.  Tolerating medications when given to her, and " "compliant thanks to family members, especially the son and daughter-in-law.    Later, the patient did open up and say that she wanted things done differently, but she realizes that instead of somewhat resenting her current situation in silence, it would do her good, as well as do good to her caregivers, her son and her daughter-in-law, if she would only speak.  She then pointed out that she would like to have \"more lettuce.  She has a salad every time and does not offer me any.\"  Likewise, she points out that she would like to have her water warm.  We did discuss that most people actually like ice water, and that is probably why she was being offered ice water.  Again, she realizes now that all she needs to do is ask.    With cold intolerance, fatigue, daytime sleepiness, TSH levels have been noticed to be mildly elevated 4.12, still with normal free T4.  Still, with this presentation, the patient and POA do understand that we can increase her levothyroxine, and we will just watch her hemodynamically, and make sure that she does not have any overreactions.  The patient and son-in-law were then requesting for me to just take over please the prescription of levothyroxine.    Otherwise, seemingly doing well.  Patient denies chest pain, has not had any episodes of shortness of breath or coughing.  Has apparently been initially reluctant to eat anything \"new,\" then later saying that she did enjoy her meal.  She seems to finish her meals, no abdominal distress.  No dysuria or any other urinary symptoms.  No skin changes.  No easy bruisability.  Again, compliant with medications when given, tolerating regimens.          Review of Systems  Review of systems as in history of present illness, and otherwise, reviewed separately as well, and was unremarkable/negative/noncontributory.          Objective   /71 (BP Location: Left arm, Patient Position: Sitting, BP Cuff Size: Adult)   Pulse 66   Resp 20   Ht 1.575 m (5' " "2\")   Wt 71.4 kg (157 lb 4.8 oz)   SpO2 92%   BMI 28.77 kg/m²     Physical Exam  Seemingly in good spirits.  Not in distress or diaphoresis.  Alert, oriented to person and place.  Amiable.  Not unkempt.  Moderately built.  Appropriate.  Does want to improve quality of life.  Does not wish harm to self or others.    HEAD pink palpebral conjunctivae, anicteric sclerae.  Mucous membranes moist.  NECK supple, no apparent jugular venous distention.  No carotid bruit appreciated.  CARDIOVASCULAR not in distress or diaphoresis.  No bipedal edema.  Systolic murmur heard best along second intercostal space, right sternal border.  Otherwise, seemingly regular rate and rhythm.  LUNGS not in distress or diaphoresis.  Not using accessory muscles.  Clear to auscultation bilaterally.  ABDOMEN soft, nontender.  BACK no costovertebral angle tenderness.  EXTREMITIES no clubbing, no cyanosis.  NEURO no facial asymmetry.  No apparent cranial nerve deficits.  Romberg negative.  Ambulating with the use of a rollator walker, but able to do short transfers today without any assistance.  No apparent focal weakness.  No tremors.  PSYCH receptive, appropriate, and eager to maintain and improve quality of life.          Assessment/Plan   Problem List Items Addressed This Visit       Vitamin D deficiency    Relevant Medications    cholecalciferol (Vitamin D3) 1,250 mcg (50,000 unit) tablet    Other Relevant Orders    XR DEXA bone density    Follow Up In Primary Care - Established    Hypothyroidism due to acquired atrophy of thyroid    Relevant Medications    levothyroxine (Synthroid, Levoxyl) 88 mcg tablet    Other Relevant Orders    Follow Up In Primary Care - Established    Hypomagnesemia    Relevant Medications    magnesium oxide (Mag-Ox) 400 mg (241.3 mg magnesium) tablet    Other Relevant Orders    Follow Up In Primary Care - Established    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 (CMS/HCC)    " Relevant Medications    sertraline (Zoloft) 25 mg tablet    Other Relevant Orders    Follow Up In Primary Care - Established    Debility - Primary    Relevant Orders    Follow Up In Primary Care - Established    Cold intolerance    Relevant Orders    Follow Up In Primary Care - Established    Caregiver stress    Relevant Orders    Follow Up In Primary Care - Established    Ataxic gait    Relevant Orders    XR DEXA bone density    Follow Up In Primary Care - Established    Age-related osteoporosis without current pathological fracture    Relevant Orders    XR DEXA bone density    Follow Up In Primary Care - Established        Thank you very much for coming.  I am very happy to see you.  I am very happy to hear that you have been doing very well, and that you are very happy to be with Delfino and Cydney!  They are also very happy that you are with them!    I am also glad to hear that you have been doing well with your SERTRALINE/ZOLOFT regimen.  Indeed, you will have a refill of this medication for the next 6 months.    Still, you can always do better.  Let us have NEUROLOGY, Dr. Sanchez, take a look at you.  Also, if you feel you need a little bit more assistance or prodding, please let me know, and I can order physical therapy under HOME CARE to visit you at home.    Thank you for doing your FASTING blood and urine examinations.  Everything came back very good.  Your MAGNESIUM is a little low.  We will just put you on a magnesium supplement once a day, always with food.  Magnesium might cause diarrhea, and to prevent this, you just have to take this magnesium with FOOD every day.    We checked your vitamin D levels, and you will benefit from a VITAMIN D supplement once a week, Sundays, with lunch and a full glass of water.    Because we want to preserve your function and protect you from fractures in the future, we will check how you are doing with your current regimens by doing a BONE DENSITY test.  This is a very easy  test, where they will do an ultrasound of your back bone and hip bone, and they will let me know how well you are doing with your bone strength.  Likewise, I will send word with any results and possible changes.    Because you are feeling cold a lot, and because your blood examinations suggest that your thyroid is underperforming, we will adjust your THYROID medicine increase your thyroid dose to 88 mcg.  Take this before breakfast every morning.  Please see Dr. Kruger, ENDOCRINOLOGY, once more, then afterwards, you can tell him that I will take care of your thyroid needs.  I have also checked you for diabetes.  Your marker for elevated sugar is all normal.  You can eat anything that you want.    Remember, all you have to do is tell us what you want.  It is easier for us to please you if you go ahead and tell us what you want.  You will be doing as a favor when you do this.    You are due for PNEUMONIA vaccination, PREVNAR 20.  Contact pharmacist, and he will be very happy to make arrangements for you.  ALSO, you are due for your SHINGLES vaccination that comes into injections.  Just remember to please take your vaccinations only 1 at that time.  Space out your vaccinations at least 2 weeks apart.    It is okay for you to take naps.  Just keep them under 30 minutes each time.    Also, it is very good for you to stay stimulated.  Make sure that you do some walking at least 3 times a day, 10 minutes each time.  Walking not only stimulates the mind, but also keeps the body healthy, and keep your blood pressure well regulated.  It also helps with your mood!    Please come back in 3 months.  Call me sooner if you think you might benefit from PHYSICAL THERAPY, or if you have any questions or concerns.  I will send word regarding your bone density test results, and any changes that might need to be done.    Please continue to take care of yourself and each other, and please continue to pray for our recovery from this  pandemic.  Going to [employer] on Sundays might be a good idea, because it will give you more structure.    Please remember that you are doing us a favor whenever you tell us what you want.  Do not be afraid.  You are with your son and daughter-in-law because they want you there, and because they love you, and they want you to be happy.    See you in 3 months.  Call sooner please as needed.  I hope you have a good summer!            0  Return in 3 months.  40 minutes please.  Reassess debility, rule out caregiver stress.  Coordinate with neurology, cardiology, endocrinology, possibly physical therapy.  Reconsider home care.  Reconsider other psychoactive regimens as needed.            0  Possible caregiver stress, with patient seemingly favoring anything that her son does, and perhaps being a little critical about her daughter-in-law.  I talked to the son in confidence to make sure that this was not an issue.  He pointed out that his wife does not take things personally, or at least she tries her best.  This is probably especially relatively easy because prior to this, the son and his wife did take in the mother of the wife and she stayed with them instead of going to a nursing care facility.  Furthermore, a daughter of the patient will be taking over 3 days a week.            0

## 2023-08-25 LAB
INR BLD: 2 (ref 0.9–1.1)
INR IN PPP BY COAGULATION ASSAY: 2 (ref 0.9–1.1)
PROTHROMBIN TIME (PT) IN PPP BY COAGULATION ASSAY: 22.4 SEC (ref 9.8–12.8)
PROTHROMBIN TIME: 22.4 SEC (ref 9.8–12.8)

## 2023-09-29 ENCOUNTER — APPOINTMENT (OUTPATIENT)
Dept: PRIMARY CARE | Facility: CLINIC | Age: 88
End: 2023-09-29
Payer: COMMERCIAL

## 2023-09-29 DIAGNOSIS — E03.4 HYPOTHYROIDISM DUE TO ACQUIRED ATROPHY OF THYROID: ICD-10-CM

## 2023-10-02 RX ORDER — LEVOTHYROXINE SODIUM 88 UG/1
TABLET ORAL
Qty: 90 TABLET | Refills: 0 | Status: SHIPPED | OUTPATIENT
Start: 2023-10-02 | End: 2024-01-06

## 2023-10-29 DIAGNOSIS — I10 ESSENTIAL (PRIMARY) HYPERTENSION: ICD-10-CM

## 2023-10-29 DIAGNOSIS — E78.2 MIXED HYPERLIPIDEMIA: ICD-10-CM

## 2023-10-30 DIAGNOSIS — E78.2 MIXED HYPERLIPIDEMIA: ICD-10-CM

## 2023-10-30 DIAGNOSIS — I48.0 PAROXYSMAL ATRIAL FIBRILLATION (MULTI): ICD-10-CM

## 2023-10-30 RX ORDER — ATORVASTATIN CALCIUM 40 MG/1
40 TABLET, FILM COATED ORAL DAILY
Qty: 90 TABLET | Refills: 3 | OUTPATIENT
Start: 2023-10-30

## 2023-10-30 RX ORDER — ATORVASTATIN CALCIUM 40 MG/1
40 TABLET, FILM COATED ORAL DAILY
Qty: 90 TABLET | Refills: 3 | Status: SHIPPED | OUTPATIENT
Start: 2023-10-30 | End: 2024-10-29

## 2023-10-30 RX ORDER — METOPROLOL SUCCINATE 50 MG/1
50 TABLET, EXTENDED RELEASE ORAL 2 TIMES DAILY
Qty: 180 TABLET | Refills: 3 | Status: SHIPPED | OUTPATIENT
Start: 2023-10-30 | End: 2024-05-17 | Stop reason: SDUPTHER

## 2023-10-30 RX ORDER — METOPROLOL SUCCINATE 50 MG/1
50 TABLET, EXTENDED RELEASE ORAL 2 TIMES DAILY
Qty: 180 TABLET | Refills: 3 | OUTPATIENT
Start: 2023-10-30

## 2023-11-11 ENCOUNTER — LAB (OUTPATIENT)
Dept: LAB | Facility: LAB | Age: 88
End: 2023-11-11
Payer: COMMERCIAL

## 2023-11-11 DIAGNOSIS — I48.0 PAROXYSMAL ATRIAL FIBRILLATION (MULTI): Primary | ICD-10-CM

## 2023-11-11 DIAGNOSIS — N18.31 CHRONIC KIDNEY DISEASE, STAGE 3A (MULTI): ICD-10-CM

## 2023-11-11 DIAGNOSIS — I48.0 PAROXYSMAL ATRIAL FIBRILLATION (MULTI): ICD-10-CM

## 2023-11-11 LAB
CK SERPL-CCNC: 47 U/L (ref 0–215)
INR PPP: 1.8 (ref 0.9–1.1)
PROTHROMBIN TIME: 20.1 SECONDS (ref 9.8–12.8)

## 2023-11-11 PROCEDURE — 36415 COLL VENOUS BLD VENIPUNCTURE: CPT

## 2023-11-11 PROCEDURE — 85610 PROTHROMBIN TIME: CPT

## 2023-11-11 PROCEDURE — 82550 ASSAY OF CK (CPK): CPT

## 2023-11-24 ENCOUNTER — OFFICE VISIT (OUTPATIENT)
Dept: PRIMARY CARE | Facility: CLINIC | Age: 88
End: 2023-11-24
Payer: COMMERCIAL

## 2023-11-24 VITALS
HEART RATE: 81 BPM | DIASTOLIC BLOOD PRESSURE: 75 MMHG | WEIGHT: 161 LBS | OXYGEN SATURATION: 95 % | SYSTOLIC BLOOD PRESSURE: 137 MMHG | RESPIRATION RATE: 18 BRPM | HEIGHT: 62 IN | BODY MASS INDEX: 29.63 KG/M2

## 2023-11-24 DIAGNOSIS — R26.0 ATAXIC GAIT: ICD-10-CM

## 2023-11-24 DIAGNOSIS — D50.9 IRON DEFICIENCY ANEMIA, UNSPECIFIED IRON DEFICIENCY ANEMIA TYPE: ICD-10-CM

## 2023-11-24 DIAGNOSIS — I10 ESSENTIAL HYPERTENSION: ICD-10-CM

## 2023-11-24 DIAGNOSIS — R73.9 HYPERGLYCEMIA: ICD-10-CM

## 2023-11-24 DIAGNOSIS — G30.1 DEMENTIA OF THE ALZHEIMER'S TYPE WITH LATE ONSET WITHOUT BEHAVIORAL DISTURBANCE (MULTI): ICD-10-CM

## 2023-11-24 DIAGNOSIS — F02.80 DEMENTIA OF THE ALZHEIMER'S TYPE WITH LATE ONSET WITHOUT BEHAVIORAL DISTURBANCE (MULTI): ICD-10-CM

## 2023-11-24 DIAGNOSIS — M81.0 AGE-RELATED OSTEOPOROSIS WITHOUT CURRENT PATHOLOGICAL FRACTURE: ICD-10-CM

## 2023-11-24 DIAGNOSIS — Z63.6 CAREGIVER STRESS: ICD-10-CM

## 2023-11-24 DIAGNOSIS — E83.42 HYPOMAGNESEMIA: ICD-10-CM

## 2023-11-24 DIAGNOSIS — E55.9 VITAMIN D DEFICIENCY: ICD-10-CM

## 2023-11-24 DIAGNOSIS — M60.19 INTERSTITIAL MYOSITIS OF MULTIPLE SITES: ICD-10-CM

## 2023-11-24 DIAGNOSIS — R53.81 DEBILITY: Primary | ICD-10-CM

## 2023-11-24 DIAGNOSIS — E78.2 MIXED HYPERLIPIDEMIA: ICD-10-CM

## 2023-11-24 DIAGNOSIS — E03.4 HYPOTHYROIDISM DUE TO ACQUIRED ATROPHY OF THYROID: ICD-10-CM

## 2023-11-24 LAB
ALBUMIN SERPL BCP-MCNC: 4 G/DL (ref 3.4–5)
ALP SERPL-CCNC: 61 U/L (ref 33–136)
ALT SERPL W P-5'-P-CCNC: 31 U/L (ref 7–45)
ANION GAP SERPL CALC-SCNC: 11 MMOL/L (ref 10–20)
AST SERPL W P-5'-P-CCNC: 27 U/L (ref 9–39)
BASOPHILS # BLD AUTO: 0.05 X10*3/UL (ref 0–0.1)
BASOPHILS NFR BLD AUTO: 0.4 %
BILIRUB SERPL-MCNC: 0.6 MG/DL (ref 0–1.2)
BUN SERPL-MCNC: 25 MG/DL (ref 6–23)
CALCIUM SERPL-MCNC: 9 MG/DL (ref 8.6–10.3)
CHLORIDE SERPL-SCNC: 97 MMOL/L (ref 98–107)
CO2 SERPL-SCNC: 31 MMOL/L (ref 21–32)
CREAT SERPL-MCNC: 0.96 MG/DL (ref 0.5–1.05)
EOSINOPHIL # BLD AUTO: 0.37 X10*3/UL (ref 0–0.4)
EOSINOPHIL NFR BLD AUTO: 3 %
ERYTHROCYTE [DISTWIDTH] IN BLOOD BY AUTOMATED COUNT: 13.9 % (ref 11.5–14.5)
GFR SERPL CREATININE-BSD FRML MDRD: 57 ML/MIN/1.73M*2
GLUCOSE SERPL-MCNC: 85 MG/DL (ref 74–99)
HCT VFR BLD AUTO: 37.2 % (ref 36–46)
HGB BLD-MCNC: 12.4 G/DL (ref 12–16)
IMM GRANULOCYTES # BLD AUTO: 0.05 X10*3/UL (ref 0–0.5)
IMM GRANULOCYTES NFR BLD AUTO: 0.4 % (ref 0–0.9)
LYMPHOCYTES # BLD AUTO: 1.93 X10*3/UL (ref 0.8–3)
LYMPHOCYTES NFR BLD AUTO: 15.5 %
MAGNESIUM SERPL-MCNC: 1.53 MG/DL (ref 1.6–2.4)
MCH RBC QN AUTO: 29.6 PG (ref 26–34)
MCHC RBC AUTO-ENTMCNC: 33.3 G/DL (ref 32–36)
MCV RBC AUTO: 89 FL (ref 80–100)
MONOCYTES # BLD AUTO: 1.36 X10*3/UL (ref 0.05–0.8)
MONOCYTES NFR BLD AUTO: 10.9 %
NEUTROPHILS # BLD AUTO: 8.67 X10*3/UL (ref 1.6–5.5)
NEUTROPHILS NFR BLD AUTO: 69.8 %
NRBC BLD-RTO: 0 /100 WBCS (ref 0–0)
PLATELET # BLD AUTO: 238 X10*3/UL (ref 150–450)
POTASSIUM SERPL-SCNC: 3.7 MMOL/L (ref 3.5–5.3)
PROT SERPL-MCNC: 6.7 G/DL (ref 6.4–8.2)
RBC # BLD AUTO: 4.19 X10*6/UL (ref 4–5.2)
SODIUM SERPL-SCNC: 135 MMOL/L (ref 136–145)
TSH SERPL-ACNC: 1.15 MIU/L (ref 0.44–3.98)
WBC # BLD AUTO: 12.4 X10*3/UL (ref 4.4–11.3)

## 2023-11-24 PROCEDURE — 83735 ASSAY OF MAGNESIUM: CPT

## 2023-11-24 PROCEDURE — 99214 OFFICE O/P EST MOD 30 MIN: CPT | Performed by: INTERNAL MEDICINE

## 2023-11-24 PROCEDURE — G0008 ADMIN INFLUENZA VIRUS VAC: HCPCS | Performed by: INTERNAL MEDICINE

## 2023-11-24 PROCEDURE — 1036F TOBACCO NON-USER: CPT | Performed by: INTERNAL MEDICINE

## 2023-11-24 PROCEDURE — 1160F RVW MEDS BY RX/DR IN RCRD: CPT | Performed by: INTERNAL MEDICINE

## 2023-11-24 PROCEDURE — 3075F SYST BP GE 130 - 139MM HG: CPT | Performed by: INTERNAL MEDICINE

## 2023-11-24 PROCEDURE — 80053 COMPREHEN METABOLIC PANEL: CPT

## 2023-11-24 PROCEDURE — 84443 ASSAY THYROID STIM HORMONE: CPT

## 2023-11-24 PROCEDURE — 3078F DIAST BP <80 MM HG: CPT | Performed by: INTERNAL MEDICINE

## 2023-11-24 PROCEDURE — 1159F MED LIST DOCD IN RCRD: CPT | Performed by: INTERNAL MEDICINE

## 2023-11-24 PROCEDURE — 85025 COMPLETE CBC W/AUTO DIFF WBC: CPT

## 2023-11-24 PROCEDURE — 90662 IIV NO PRSV INCREASED AG IM: CPT | Performed by: INTERNAL MEDICINE

## 2023-11-24 PROCEDURE — 36415 COLL VENOUS BLD VENIPUNCTURE: CPT

## 2023-11-24 SDOH — SOCIAL STABILITY - SOCIAL INSECURITY: DEPENDENT RELATIVE NEEDING CARE AT HOME: Z63.6

## 2023-11-24 NOTE — PROGRESS NOTES
"Subjective   Patient ID: Shona Everett is a 88 y.o. female who presents for Follow-up.    HPI   The patient is here for reevaluation of history of dementia, debility, rule out self-neglect, caregiver stress.  Has been compliant with medications with the help of family, and does not seemingly resent the help.  Tolerating regimens.    Denies any particular symptoms.  Has no complaints.  No recent falls.  Managing with the use of a wheeled walker.  Denies headache, double vision.  Partially not worried about memory, and does not wish harm to self or anybody else.    Denies chest pain, palpitations, no history of loss of consciousness.  No particular dyspnea on exertion.  No particular cough, no particular sputum production.  CONSTITUTIONALLY, no fever, no chills.  No night sweats.  No lingering anorexia or nausea.  No apparent lymphadenopathy.  No apparent weight loss.    Denies abdominal pain.  No difficulty with urination.  No apparent skin changes.  Daughter in attendance, seemingly coping well helping with activities of daily living.        Review of Systems  Review of systems as in history of present illness, and otherwise, reviewed separately as well, and was unremarkable/negative/noncontributory.          Objective   /75 (BP Location: Left arm, Patient Position: Sitting, BP Cuff Size: Adult)   Pulse 81   Resp 18   Ht 1.575 m (5' 2\")   Wt 73 kg (161 lb)   SpO2 95%   BMI 29.45 kg/m²     Physical Exam  In good spirits.  Not in distress or diaphoresis.  Receptive, oriented to person and place, sometimes a little slow to answer.  Amiable.  Not unkempt.  Moderately built, not cachectic.  Seemingly appropriate.  Eager to maintain quality of life, and does not seemingly wish harm to self or others.    HEAD pink palpebral conjunctivae, anicteric sclerae.  NECK supple, no apparent jugular venous distention.  CARDIOVASCULAR not in distress or diaphoresis.  No bipedal edema.  LUNGS not in distress or " diaphoresis.  Not using accessory muscles.  ABDOMEN soft, nontender.  BACK no costovertebral angle tenderness.  EXTREMITIES no clubbing, no cyanosis.  NEURO no facial asymmetry.  No apparent cranial nerve deficits.  Ambulating with use of wheeled walker.  No apparent focal weakness.  No tremors.  PSYCH receptive, appropriate, and eager to maintain and improve quality of life.        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 (CMS/HCC)  -     Follow Up In Primary Care - Established  -     Flu vaccine, quadrivalent, high-dose, preservative free, age 65y+ (FLUZONE)  -     Follow Up In Primary Care - Established; Future  Ataxic gait  -     Follow Up In Primary Care - Established  -     Flu vaccine, quadrivalent, high-dose, preservative free, age 65y+ (FLUZONE)  -     Follow Up In Primary Care - Established; Future  Vitamin D deficiency  -     Follow Up In Primary Care - Established  -     Flu vaccine, quadrivalent, high-dose, preservative free, age 65y+ (FLUZONE)  -     Follow Up In Primary Care - Established; Future  Age-related osteoporosis without current pathological fracture  -     Follow Up In Primary Care - Established  -     Flu vaccine, quadrivalent, high-dose, preservative free, age 65y+ (FLUZONE)  -     Follow Up In Primary Care - Established; Future  Hypothyroidism due to acquired atrophy of thyroid  -     Follow Up In Primary Care - Established  -     Flu vaccine, quadrivalent, high-dose, preservative free, age 65y+ (FLUZONE)  -     TSH with reflex to Free T4 if abnormal  -     Follow Up In Primary Care - Established; Future  -     TSH with reflex to Free T4 if abnormal; Future  Hypomagnesemia  -     Follow Up In [facility] - Established  -     Flu vaccine, quadrivalent, high-dose, preservative free, age 65y+ (FLUZONE)  -     Magnesium  -     Follow Up In Primary Care - Established; Future  -     Magnesium; Future  Caregiver stress  -     Follow Up In Primary Care - Established  -     Flu vaccine, quadrivalent, high-dose, preservative free, age 65y+ (FLUZONE)  -     Follow Up In Primary Care - Established; Future  Iron deficiency anemia, unspecified iron deficiency anemia type  -     Flu vaccine, quadrivalent, high-dose, preservative free, age 65y+ (FLUZONE)  -     CBC and Auto Differential  -     Follow Up In Primary Care - Established; Future  -     CBC and Auto Differential; Future  Interstitial myositis of multiple sites  -     Flu vaccine, quadrivalent, high-dose, preservative free, age 65y+ (FLUZONE)  -     Follow Up In Primary Care - Established; Future  -     Creatine Kinase; Future  Essential hypertension  -     Comprehensive Metabolic Panel  -     Magnesium  -     Follow Up In Primary Care - Established; Future  -     Comprehensive Metabolic Panel; Future  Hyperglycemia  -     Comprehensive Metabolic Panel  -     Follow Up In Primary Care - Established; Future  -     Comprehensive Metabolic Panel; Future  -     Hemoglobin A1C; Future  Mixed hyperlipidemia  -     Comprehensive Metabolic Panel  -     Follow Up In Primary Care - Established; Future  -     Comprehensive Metabolic Panel; Future  -     Lipid Panel; Future       Thank you very much for coming.  I am very happy to see you and your son!  Please continue to take care of each other.    I am glad to hear that you enjoyed your THANKSGIVING!  Please continue to enjoy the company of your family, and please continue to stay physically active.  I do understand that you are doing well with your WALKER, and that you sit down whenever you get tired.  You have not had any episodes of falling.  Please let me know if you feel unstable, and we can order your PHYSICAL THERAPY  regimens to be done at home.  A therapist can visit you in the house!    You will benefit from a BONE DENSITY test that we will schedule sometime soon.    Likewise, you will benefit from seeing a NEUROLOGIST, although it might be a while before she sees you.    Until then, let us do some BLOOD examinations today.  I will send word regarding results and possible changes.  I am glad to hear that you have not had any trouble with urination.  There is no urine testing needed at this time.    Please come back in 3 months.  When you return, please do some FASTING BLOOD and URINE examinations via the [employer] a few days prior, so that we can review the results when you return in 3 months.  We will do your Medicare Wellness evaluation at that time.    There is no need to see your ENDOCRINOLOGIST any more.  I can make the necessary adjustments for your THYROID regimens.    Please continue taking the VITAMIN D supplement for once a week, and you can stop the daily regimen.  You only need one of your vitamin D regimens, and the one that you take every week is the better one!    Remember, our goal is to PREVENT you from getting hurt in the first place.  It is easier to maintain your health then it is to recover from broken bones.    I have to say that you are doing very well.  Please continue taking care of each other, and please continue praying for our recovery from this pandemic.  Please continue seeing your specialists as they recommend.  Dr. Parsons will most likely just want to see you once a year, and we will Take Care of things in between by checking your fasting blood cholesterol and sugar when you return in about 3 months.    You are due for a FLU vaccine influenza HIGH DOSE which we can do today.  After 2 weeks, please get yourself vaccinated for PNEUMONIA with PREVNAR 20.  Very important, very helpful.  Remember, prevention is very important.  This vaccination will prevent you from having bad  pneumonias.    After another 2 weeks, consider doing the COVID booster.  Keep your vaccinations at least 2 weeks apart, so that you can get the most out of your vaccinations with the least amount of side effects like achiness and tiredness.    Again, thank you very much for taking care of each other.  Please continue to pray for our recovery from this pandemic.  I am glad that you had a good Thanksgiving.  I hope you have a good [place], and a happy new year!  See you in 3 months.  Do not forget to do my fasting lab work via the [facility].  Call sooner please as needed.            0  Return in 3 months.  40 minutes please.  FASTING laboratory examinations via the [employer], then see me a few days later for Medicare Wellness evaluation for the year.  Reassess mood, energy, function, cognition, coordinate with neurology, rule out caregiver stress.  Review preventive strategies, cardiovascular risk, coordinate with cardiology.            0

## 2023-11-24 NOTE — PATIENT INSTRUCTIONS
Thank you very much for coming.  I am very happy to see you and your son!  Please continue to take care of each other.    I am glad to hear that you enjoyed your THANKSGIVING!  Please continue to enjoy the company of your family, and please continue to stay physically active.  I do understand that you are doing well with your WALKER, and that you sit down whenever you get tired.  You have not had any episodes of falling.  Please let me know if you feel unstable, and we can order your PHYSICAL THERAPY regimens to be done at home.  A therapist can visit you in the house!    You will benefit from a BONE DENSITY test that we will schedule sometime soon.    Likewise, you will benefit from seeing a NEUROLOGIST, although it might be a while before she sees you.    Until then, let us do some BLOOD examinations today.  I will send word regarding results and possible changes.  I am glad to hear that you have not had any trouble with urination.  There is no urine testing needed at this time.    Please come back in 3 months.  When you return, please do some FASTING BLOOD and URINE examinations via the [employer] [employer] a few days prior, so that we can review the results when you return in 3 months.  We will do your Medicare Wellness evaluation at that time.    There is no need to see your ENDOCRINOLOGIST any more.  I can make the necessary adjustments for your THYROID regimens.    Please continue taking the VITAMIN D supplement for once a week, and you can stop the daily regimen.  You only need one of your vitamin D regimens, and the one that you take every week is the better one!    Remember, our goal is to PREVENT you from getting hurt in the first place.  It is easier to maintain your health then it is to recover from broken bones.    I have to say that you are doing very well.  Please continue taking care of each other, and please continue praying for our recovery from this pandemic.  Please continue seeing your specialists as  they recommend.  Dr. Parsons will most likely just want to see you once a year, and we will Take Care of things in between by checking your fasting blood cholesterol and sugar when you return in about 3 months.    You are due for a FLU vaccine influenza HIGH DOSE which we can do today.  After 2 weeks, please get yourself vaccinated for PNEUMONIA with PREVNAR 20.  Very important, very helpful.  Remember, prevention is very important.  This vaccination will prevent you from having bad pneumonias.    After another 2 weeks, consider doing the COVID booster.  Keep your vaccinations at least 2 weeks apart, so that you can get the most out of your vaccinations with the least amount of side effects like achiness and tiredness.    Again, thank you very much for taking care of each other.  Please continue to pray for our recovery from this pandemic.  I am glad that you had a good Thanksgiving.  I hope you have a good [place], and a happy new year!  See you in 3 months.  Do not forget to do my fasting lab work via the [employer].  Call sooner please as needed.            0  Return in 3 months.  40 minutes please.  FASTING laboratory examinations via the [facility], then see me a few days later for Medicare Wellness evaluation for the year.  Reassess mood, energy, function, cognition, coordinate with neurology, rule out caregiver stress.  Review preventive strategies, cardiovascular risk, coordinate with cardiology.            0

## 2023-11-27 DIAGNOSIS — E83.42 HYPOMAGNESEMIA: ICD-10-CM

## 2023-11-27 RX ORDER — LANOLIN ALCOHOL/MO/W.PET/CERES
CREAM (GRAM) TOPICAL
Qty: 180 TABLET | Refills: 1 | Status: SHIPPED | OUTPATIENT
Start: 2023-11-27

## 2023-12-01 ENCOUNTER — ANCILLARY PROCEDURE (OUTPATIENT)
Dept: RADIOLOGY | Facility: CLINIC | Age: 88
End: 2023-12-01
Payer: COMMERCIAL

## 2023-12-01 DIAGNOSIS — E55.9 VITAMIN D DEFICIENCY: ICD-10-CM

## 2023-12-01 DIAGNOSIS — R26.0 ATAXIC GAIT: ICD-10-CM

## 2023-12-01 DIAGNOSIS — M81.0 AGE-RELATED OSTEOPOROSIS WITHOUT CURRENT PATHOLOGICAL FRACTURE: ICD-10-CM

## 2023-12-01 PROCEDURE — 77080 DXA BONE DENSITY AXIAL: CPT | Performed by: RADIOLOGY

## 2023-12-01 PROCEDURE — 77080 DXA BONE DENSITY AXIAL: CPT

## 2023-12-06 ENCOUNTER — APPOINTMENT (OUTPATIENT)
Dept: NEUROLOGY | Facility: CLINIC | Age: 88
End: 2023-12-06
Payer: COMMERCIAL

## 2023-12-07 DIAGNOSIS — G30.1 DEMENTIA OF THE ALZHEIMER'S TYPE WITH LATE ONSET WITHOUT BEHAVIORAL DISTURBANCE (MULTI): ICD-10-CM

## 2023-12-07 DIAGNOSIS — F02.80 DEMENTIA OF THE ALZHEIMER'S TYPE WITH LATE ONSET WITHOUT BEHAVIORAL DISTURBANCE (MULTI): ICD-10-CM

## 2023-12-11 RX ORDER — SERTRALINE HYDROCHLORIDE 25 MG/1
25 TABLET, FILM COATED ORAL DAILY
Qty: 90 TABLET | Refills: 0 | Status: SHIPPED | OUTPATIENT
Start: 2023-12-11 | End: 2024-01-18

## 2023-12-22 ENCOUNTER — TELEPHONE (OUTPATIENT)
Dept: PRIMARY CARE | Facility: CLINIC | Age: 88
End: 2023-12-22
Payer: COMMERCIAL

## 2024-01-03 DIAGNOSIS — I48.0 PAROXYSMAL ATRIAL FIBRILLATION (MULTI): ICD-10-CM

## 2024-01-05 DIAGNOSIS — E03.4 HYPOTHYROIDISM DUE TO ACQUIRED ATROPHY OF THYROID: ICD-10-CM

## 2024-01-06 RX ORDER — LEVOTHYROXINE SODIUM 88 UG/1
TABLET ORAL
Qty: 90 TABLET | Refills: 0 | Status: SHIPPED | OUTPATIENT
Start: 2024-01-06 | End: 2024-04-08

## 2024-01-17 DIAGNOSIS — F02.80 DEMENTIA OF THE ALZHEIMER'S TYPE WITH LATE ONSET WITHOUT BEHAVIORAL DISTURBANCE (MULTI): ICD-10-CM

## 2024-01-17 DIAGNOSIS — G30.1 DEMENTIA OF THE ALZHEIMER'S TYPE WITH LATE ONSET WITHOUT BEHAVIORAL DISTURBANCE (MULTI): ICD-10-CM

## 2024-01-17 DIAGNOSIS — I48.0 PAROXYSMAL ATRIAL FIBRILLATION (MULTI): ICD-10-CM

## 2024-01-18 RX ORDER — SERTRALINE HYDROCHLORIDE 25 MG/1
25 TABLET, FILM COATED ORAL DAILY
Qty: 90 TABLET | Refills: 0 | Status: SHIPPED | OUTPATIENT
Start: 2024-01-18 | End: 2024-06-11

## 2024-01-19 RX ORDER — WARFARIN 2.5 MG/1
TABLET ORAL
Qty: 90 TABLET | Refills: 0 | Status: SHIPPED | OUTPATIENT
Start: 2024-01-19 | End: 2024-06-11

## 2024-01-25 ENCOUNTER — LAB (OUTPATIENT)
Dept: LAB | Facility: LAB | Age: 89
End: 2024-01-25
Payer: COMMERCIAL

## 2024-01-25 DIAGNOSIS — I48.0 PAROXYSMAL ATRIAL FIBRILLATION (MULTI): ICD-10-CM

## 2024-01-25 LAB
INR PPP: 3 (ref 0.9–1.1)
PROTHROMBIN TIME: 34.3 SECONDS (ref 9.8–12.8)

## 2024-01-25 PROCEDURE — 36415 COLL VENOUS BLD VENIPUNCTURE: CPT

## 2024-01-25 PROCEDURE — 85610 PROTHROMBIN TIME: CPT

## 2024-02-23 ENCOUNTER — OFFICE VISIT (OUTPATIENT)
Dept: PRIMARY CARE | Facility: CLINIC | Age: 89
End: 2024-02-23
Payer: COMMERCIAL

## 2024-02-23 VITALS
WEIGHT: 165 LBS | BODY MASS INDEX: 30.36 KG/M2 | DIASTOLIC BLOOD PRESSURE: 72 MMHG | HEIGHT: 62 IN | HEART RATE: 85 BPM | OXYGEN SATURATION: 86 % | SYSTOLIC BLOOD PRESSURE: 112 MMHG

## 2024-02-23 DIAGNOSIS — D50.9 IRON DEFICIENCY ANEMIA, UNSPECIFIED IRON DEFICIENCY ANEMIA TYPE: ICD-10-CM

## 2024-02-23 DIAGNOSIS — M81.0 AGE-RELATED OSTEOPOROSIS WITHOUT CURRENT PATHOLOGICAL FRACTURE: ICD-10-CM

## 2024-02-23 DIAGNOSIS — F02.80 DEMENTIA OF THE ALZHEIMER'S TYPE WITH LATE ONSET WITHOUT BEHAVIORAL DISTURBANCE (MULTI): ICD-10-CM

## 2024-02-23 DIAGNOSIS — R73.9 HYPERGLYCEMIA: ICD-10-CM

## 2024-02-23 DIAGNOSIS — R40.0 DAYTIME SLEEPINESS: ICD-10-CM

## 2024-02-23 DIAGNOSIS — I20.9 ANGINA, CLASS IV (CMS-HCC): ICD-10-CM

## 2024-02-23 DIAGNOSIS — G30.1 DEMENTIA OF THE ALZHEIMER'S TYPE WITH LATE ONSET WITHOUT BEHAVIORAL DISTURBANCE (MULTI): ICD-10-CM

## 2024-02-23 DIAGNOSIS — R06.09 DYSPNEA ON EXERTION: ICD-10-CM

## 2024-02-23 DIAGNOSIS — E55.9 VITAMIN D DEFICIENCY: ICD-10-CM

## 2024-02-23 DIAGNOSIS — I48.0 PAROXYSMAL ATRIAL FIBRILLATION (MULTI): ICD-10-CM

## 2024-02-23 DIAGNOSIS — R26.0 ATAXIC GAIT: ICD-10-CM

## 2024-02-23 DIAGNOSIS — M65.332 TRIGGER MIDDLE FINGER OF LEFT HAND: ICD-10-CM

## 2024-02-23 DIAGNOSIS — E83.42 HYPOMAGNESEMIA: ICD-10-CM

## 2024-02-23 DIAGNOSIS — E03.4 HYPOTHYROIDISM DUE TO ACQUIRED ATROPHY OF THYROID: ICD-10-CM

## 2024-02-23 DIAGNOSIS — I10 ESSENTIAL HYPERTENSION: ICD-10-CM

## 2024-02-23 DIAGNOSIS — N18.31 CHRONIC KIDNEY DISEASE, STAGE 3A (MULTI): ICD-10-CM

## 2024-02-23 DIAGNOSIS — M60.19 INTERSTITIAL MYOSITIS OF MULTIPLE SITES: ICD-10-CM

## 2024-02-23 DIAGNOSIS — Z00.00 ROUTINE GENERAL MEDICAL EXAMINATION AT HEALTH CARE FACILITY: Primary | ICD-10-CM

## 2024-02-23 DIAGNOSIS — Z63.6 CAREGIVER STRESS: ICD-10-CM

## 2024-02-23 DIAGNOSIS — E78.2 MIXED HYPERLIPIDEMIA: ICD-10-CM

## 2024-02-23 DIAGNOSIS — I73.9 PERIPHERAL VASCULAR DISEASE, UNSPECIFIED (CMS-HCC): ICD-10-CM

## 2024-02-23 DIAGNOSIS — R60.0 BILATERAL EDEMA OF LOWER EXTREMITY: ICD-10-CM

## 2024-02-23 PROCEDURE — 1036F TOBACCO NON-USER: CPT | Performed by: INTERNAL MEDICINE

## 2024-02-23 PROCEDURE — 3078F DIAST BP <80 MM HG: CPT | Performed by: INTERNAL MEDICINE

## 2024-02-23 PROCEDURE — 1159F MED LIST DOCD IN RCRD: CPT | Performed by: INTERNAL MEDICINE

## 2024-02-23 PROCEDURE — 1160F RVW MEDS BY RX/DR IN RCRD: CPT | Performed by: INTERNAL MEDICINE

## 2024-02-23 PROCEDURE — 1170F FXNL STATUS ASSESSED: CPT | Performed by: INTERNAL MEDICINE

## 2024-02-23 PROCEDURE — G0439 PPPS, SUBSEQ VISIT: HCPCS | Performed by: INTERNAL MEDICINE

## 2024-02-23 PROCEDURE — 3074F SYST BP LT 130 MM HG: CPT | Performed by: INTERNAL MEDICINE

## 2024-02-23 PROCEDURE — 1123F ACP DISCUSS/DSCN MKR DOCD: CPT | Performed by: INTERNAL MEDICINE

## 2024-02-23 PROCEDURE — 1157F ADVNC CARE PLAN IN RCRD: CPT | Performed by: INTERNAL MEDICINE

## 2024-02-23 PROCEDURE — 99214 OFFICE O/P EST MOD 30 MIN: CPT | Performed by: INTERNAL MEDICINE

## 2024-02-23 SDOH — SOCIAL STABILITY - SOCIAL INSECURITY: DEPENDENT RELATIVE NEEDING CARE AT HOME: Z63.6

## 2024-02-23 ASSESSMENT — ACTIVITIES OF DAILY LIVING (ADL)
GROCERY_SHOPPING: TOTAL CARE
DOING_HOUSEWORK: TOTAL CARE
TAKING_MEDICATION: TOTAL CARE
DRESSING: INDEPENDENT
MANAGING_FINANCES: TOTAL CARE
BATHING: INDEPENDENT

## 2024-02-23 ASSESSMENT — PATIENT HEALTH QUESTIONNAIRE - PHQ9
2. FEELING DOWN, DEPRESSED OR HOPELESS: NOT AT ALL
SUM OF ALL RESPONSES TO PHQ9 QUESTIONS 1 AND 2: 0
1. LITTLE INTEREST OR PLEASURE IN DOING THINGS: NOT AT ALL

## 2024-02-23 ASSESSMENT — ENCOUNTER SYMPTOMS
DEPRESSION: 0
OCCASIONAL FEELINGS OF UNSTEADINESS: 0
LOSS OF SENSATION IN FEET: 0

## 2024-02-23 NOTE — PATIENT INSTRUCTIONS
Thank you very much for coming.  I am very happy to see you!  I am glad you and your son are doing very well.  I am happy to hear that you have a routine going, where you get to visit not only her son, but also your daughter.  I believe that the change each week will keep you stimulated!    In the meantime, it seems that it is also making you tired!  Let us do some FASTING laboratory examinations anytime soon.  I will send word regarding results and possible changes.  Your old ENDOCRINOLOGIST also was requesting an ULTRASOUND of your THYROID.  We can get this scheduled sometime soon as well.    I also do understand that you have been having trouble with pain affecting your LEFT HAND, including your MIDDLE FINGER.  Let us do an x-ray of your hand, and in the future, you might benefit from evaluation by Dr. Parr.  His specialty is repairing bones and muscles!    You will also benefit from seeing a NEUROLOGIST, Dr. Sanchez.  She will make sure that you are on proper regimens to keep your mind as sharp as it can be!    I am glad to hear that you are all doing very well, and that you are happy in the company of your children, and that they are happy to accommodate you!  I would like to contribute by making sure that you remain as happy and healthy and independent as you can be.    We did your Medicare Wellness evaluation today, and you seem to be doing well otherwise.  Please do your FASTING laboratory examinations soon, and I will send word regarding results and possible changes.    We will also determine whether or not you are having increased fatigue and sleepiness during the daytime, and if there is anything more specific that needs to be done.    Let me see you again in 3 months.  Until then, please continue to take care of yourself and each other, and please continue to pray for our recovery from this pandemic.    We reviewed your LIVING WILL wishes and your CODE STATUS.  I do understand that you would like to  keep your SON, Delfino Salas, as your DURABLE POWER OF  for healthcare matters.  This means that if you are unable to express yourself, he will be the 1 to speak on your behalf.    Your CODE STATUS is FULL CODE, meaning that we will do everything necessary to save your life and to preserve it, including CPR, and even extraordinary means of treatment like putting a tube down your throat to help you breathe.  After you are stabilized, we will discuss how you are doing, and what your options are.  We will then make any further decisions as necessary.  At the very least, we will make sure that you are kept comfortable, and that we will try your best to maintain your overall quality of life.    Again, thank you very much for coming.  See you in 3 months.  Call sooner please as needed.  I will send word regarding results and possible changes.  I hope you have a good spring time, and a happy Easter!  See you in May.            0  Return in 3 months.  40 minutes please.  Reassess debility.  Coordinate with son/POA.  Coordinate with neurology, cardiology, possibly orthopedics.  Continue to provide for patient while she tries to stay at home as much as possible.  Watch out for caregiver stress.            0

## 2024-02-23 NOTE — PROGRESS NOTES
Subjective   Reason for Visit: Shona Everett is an 88 y.o. female here for a Medicare Wellness visit.     Past Medical, Surgical, and Family History reviewed and updated in chart.    Reviewed all medications by prescribing practitioner or clinical pharmacist (such as prescriptions, OTCs, herbal therapies and supplements) and documented in the medical record.    HPI  The patient is here for reevaluation of history of debility.  Of late, the son points out that his mother/the patient has been showing some signs of easy fatigability, dyspnea on exertion.  The patient is present, stating that she has not had any accompanying chest pain, palpitations, orthopnea, or paroxysmal nocturnal dyspnea.  She has had some bilateral edema affecting her ankles, unchanged, dissipating after recumbency overnight.  No calf tenderness bilaterally.    No particular coughing, no particular sputum production.  CONSTITUTIONALLY, no fever, no chills.  No night sweats.  No lingering anorexia or nausea.  No apparent lymphadenopathy.  No apparent weight loss.    Also, the son points out that the patient has been sleeping more during the daytime.  She has not had any restlessness at night.  Baseline mentation, no persistence of confusion, and remains easily directable, not resentful of direction being given to her.  No jeff delirium.  The patient states no headache, no double vision.    The son points out that half the week, the patient is with her daughter whom has young children in her stead.  He believes that she is happily engaged, but perhaps at the same time, she gets overworked.  Thus, when she returns to his care, she seems to be more tired than usual.  Otherwise, no focal weakness.  No recent falls.    Otherwise, the patient is able to keep up with her medications, compliant with regimens.  Appetite preserved, no abdominal distress.  No dysuria, flank, suprapubic pain.  No skin changes, rashes, pruritus, jaundice.  No easy  "bruisability.  No apparent blood in urine or stool.  ENDOCRINE with no polyuria, polydipsia, polyphagia.  No blurred vision.  No skin, hair, nail changes.  No dramatic weight loss or weight gain.      Living will wishes, CODE STATUS, all reviewed with patient and son/POA.  Please see.  Medicare Wellness also reviewed, please see.    Patient Care Team:  Mike Ashford MD as PCP - General         Review of Systems  Review of systems as in history of present illness, and otherwise, reviewed separately as well, and was unremarkable/negative/noncontributory.        Objective   Vitals:  /72 (BP Location: Left arm, Patient Position: Sitting)   Pulse 85   Ht 1.575 m (5' 2\")   Wt 74.8 kg (165 lb)   SpO2 (!) 86%   BMI 30.18 kg/m²       Physical Exam  In good spirits.  Not in distress or diaphoresis.  Receptive, oriented to person and place.  Soft-spoken, sometimes a little slow to answer.  Minimally hard of hearing perhaps, but coping well.  Continues to want to maintain and perhaps improve quality of life.  Does not wish harm to self or others.  Moderately built.  Not unkempt.  Appropriate.    HEAD pink palpebral conjunctivae, anicteric sclerae.  Mucous membranes moist.  No tonsillopharyngeal congestion, no thrush.  NECK supple, no apparent jugular venous distention.  No carotid bruit.  CARDIOVASCULAR not in distress or diaphoresis.  +1 edema ankles.  No apparent calf tenderness.  Seemingly regular rate and rhythm.  No murmurs appreciated.  LUNGS not in distress or diaphoresis.  Not using accessory muscles.  Fleeting crackles, right base.  No other adventitious sounds noted.  ABDOMEN soft, nontender.  BACK no costovertebral angle tenderness.  EXTREMITIES no clubbing, no cyanosis.  NEURO no facial asymmetry.  No apparent cranial nerve deficits.  Ambulating with use of wheeled walker.  No apparent focal weakness.  No tremors.  PSYCH receptive, appropriate, and eager to maintain and improve quality of " life.      LABORATORY results reviewed with patient.      Assessment/Plan   Shona was seen today for mcfrancisco javier.  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 (CMS/HCC)  -     Follow Up In Primary Care - Established  -     Referral to Neurology; Future  -     Follow Up In Primary Care - Established; Future  Ataxic gait  -     Referral to Neurology; Future  -     Follow Up In Primary Care - Established; Future  Vitamin D deficiency  -     Follow Up In Primary Care - Established; Future  Age-related osteoporosis without current pathological fracture  Comments:  DEXA 06/23  Orders:  -     Follow Up In Primary Care - Established; Future  Hypomagnesemia  -     Follow Up In Primary Care - Established; Future  Caregiver stress  -     Follow Up In Primary Care - Established; Future  Iron deficiency anemia, unspecified iron deficiency anemia type  -     Follow Up In Primary Care - Established; Future  Interstitial myositis of multiple sites  -     Follow Up In Primary Care - Established; Future  Essential hypertension  -     Follow Up In Primary Care - Established; Future  Hyperglycemia  -     Follow Up In Primary Care - Established; Future  Mixed hyperlipidemia  -     Follow Up In Primary Care - Established; Future  Trigger middle finger of left hand  -     XR hand left 3+ views; Future  -     Follow Up In Primary Care - Established; Future  Paroxysmal atrial fibrillation (CMS/HCC)  -     Follow Up In Primary Care -  Established; Future  Peripheral vascular disease, unspecified (CMS/HCC)  -     Follow Up In Primary Care - Established; Future  Angina, class IV (CMS/HCC)  -     Follow Up In Primary Care - Established; Future  Chronic kidney disease, stage 3a (CMS/HCC)  -     Follow Up In Primary Care - Established; Future      Thank you very much for coming.  I am very happy to see you!  I am glad you and your son are doing very well.  I am happy to hear that you have a routine going, where you get to visit not only her son, but also your daughter.  I believe that the change each week will keep you stimulated!    In the meantime, it seems that it is also making you tired!  Let us do some FASTING laboratory examinations anytime soon.  I will send word regarding results and possible changes.  Your old ENDOCRINOLOGIST also was requesting an ULTRASOUND of your THYROID.  We can get this scheduled sometime soon as well.    I also do understand that you have been having trouble with pain affecting your LEFT HAND, including your MIDDLE FINGER.  Let us do an x-ray of your hand, and in the future, you might benefit from evaluation by Dr. Parr.  His specialty is repairing bones and muscles!    You will also benefit from seeing a NEUROLOGIST, Dr. Sanchez.  She will make sure that you are on proper regimens to keep your mind as sharp as it can be!    I am glad to hear that you are all doing very well, and that you are happy in the company of your children, and that they are happy to accommodate you!  I would like to contribute by making sure that you remain as happy and healthy and independent as you can be.    We did your Medicare Wellness evaluation today, and you seem to be doing well otherwise.  Please do your FASTING laboratory examinations soon, and I will send word regarding results and possible changes.    We will also determine whether or not you are having increased fatigue and sleepiness during the daytime, and if there is  anything more specific that needs to be done.    Let me see you again in 3 months.  Until then, please continue to take care of yourself and each other, and please continue to pray for our recovery from this pandemic.    We reviewed your LIVING WILL wishes and your CODE STATUS.  I do understand that you would like to keep your SON, Delfino Salas, as your DURABLE POWER OF  for healthcare matters.  This means that if you are unable to express yourself, he will be the 1 to speak on your behalf.    Your CODE STATUS is FULL CODE, meaning that we will do everything necessary to save your life and to preserve it, including CPR, and even extraordinary means of treatment like putting a tube down your throat to help you breathe.  After you are stabilized, we will discuss how you are doing, and what your options are.  We will then make any further decisions as necessary.  At the very least, we will make sure that you are kept comfortable, and that we will try your best to maintain your overall quality of life.    Again, thank you very much for coming.  See you in 3 months.  Call sooner please as needed.  I will send word regarding results and possible changes.  I hope you have a good spring time, and a happy Easter!  See you in May.            0  Return in 3 months.  40 minutes please.  Reassess debility.  Coordinate with son/POA.  Coordinate with neurology, cardiology, possibly orthopedics.  Continue to provide for patient while she tries to stay at home as much as possible.  Watch out for caregiver stress.            0

## 2024-02-24 ENCOUNTER — LAB (OUTPATIENT)
Dept: LAB | Facility: LAB | Age: 89
End: 2024-02-24
Payer: COMMERCIAL

## 2024-02-24 DIAGNOSIS — E83.42 HYPOMAGNESEMIA: ICD-10-CM

## 2024-02-24 DIAGNOSIS — M60.19 INTERSTITIAL MYOSITIS OF MULTIPLE SITES: ICD-10-CM

## 2024-02-24 DIAGNOSIS — I48.0 PAROXYSMAL ATRIAL FIBRILLATION (MULTI): ICD-10-CM

## 2024-02-24 DIAGNOSIS — E03.4 HYPOTHYROIDISM DUE TO ACQUIRED ATROPHY OF THYROID: ICD-10-CM

## 2024-02-24 DIAGNOSIS — R73.9 HYPERGLYCEMIA: ICD-10-CM

## 2024-02-24 DIAGNOSIS — D50.9 IRON DEFICIENCY ANEMIA, UNSPECIFIED IRON DEFICIENCY ANEMIA TYPE: ICD-10-CM

## 2024-02-24 DIAGNOSIS — E78.2 MIXED HYPERLIPIDEMIA: ICD-10-CM

## 2024-02-24 DIAGNOSIS — I10 ESSENTIAL HYPERTENSION: ICD-10-CM

## 2024-02-24 LAB
ALBUMIN SERPL BCP-MCNC: 3.8 G/DL (ref 3.4–5)
ALP SERPL-CCNC: 53 U/L (ref 33–136)
ALT SERPL W P-5'-P-CCNC: 19 U/L (ref 7–45)
ANION GAP SERPL CALC-SCNC: 12 MMOL/L (ref 10–20)
AST SERPL W P-5'-P-CCNC: 21 U/L (ref 9–39)
BASOPHILS # BLD AUTO: 0.08 X10*3/UL (ref 0–0.1)
BASOPHILS NFR BLD AUTO: 0.9 %
BILIRUB SERPL-MCNC: 0.6 MG/DL (ref 0–1.2)
BUN SERPL-MCNC: 16 MG/DL (ref 6–23)
CALCIUM SERPL-MCNC: 9.3 MG/DL (ref 8.6–10.6)
CHLORIDE SERPL-SCNC: 95 MMOL/L (ref 98–107)
CHOLEST SERPL-MCNC: 109 MG/DL (ref 0–199)
CHOLESTEROL/HDL RATIO: 2.3
CK SERPL-CCNC: 56 U/L (ref 0–215)
CO2 SERPL-SCNC: 30 MMOL/L (ref 21–32)
CREAT SERPL-MCNC: 0.89 MG/DL (ref 0.5–1.05)
EGFRCR SERPLBLD CKD-EPI 2021: 63 ML/MIN/1.73M*2
EOSINOPHIL # BLD AUTO: 0.49 X10*3/UL (ref 0–0.4)
EOSINOPHIL NFR BLD AUTO: 5.7 %
ERYTHROCYTE [DISTWIDTH] IN BLOOD BY AUTOMATED COUNT: 16.2 % (ref 11.5–14.5)
EST. AVERAGE GLUCOSE BLD GHB EST-MCNC: 114 MG/DL
GLUCOSE SERPL-MCNC: 89 MG/DL (ref 74–99)
HBA1C MFR BLD: 5.6 %
HCT VFR BLD AUTO: 33.7 % (ref 36–46)
HDLC SERPL-MCNC: 46.5 MG/DL
HGB BLD-MCNC: 11 G/DL (ref 12–16)
IMM GRANULOCYTES # BLD AUTO: 0.01 X10*3/UL (ref 0–0.5)
IMM GRANULOCYTES NFR BLD AUTO: 0.1 % (ref 0–0.9)
INR PPP: 3.6 (ref 0.9–1.1)
LDLC SERPL CALC-MCNC: 51 MG/DL
LYMPHOCYTES # BLD AUTO: 1.62 X10*3/UL (ref 0.8–3)
LYMPHOCYTES NFR BLD AUTO: 18.9 %
MAGNESIUM SERPL-MCNC: 1.61 MG/DL (ref 1.6–2.4)
MCH RBC QN AUTO: 29.6 PG (ref 26–34)
MCHC RBC AUTO-ENTMCNC: 32.6 G/DL (ref 32–36)
MCV RBC AUTO: 91 FL (ref 80–100)
MONOCYTES # BLD AUTO: 0.82 X10*3/UL (ref 0.05–0.8)
MONOCYTES NFR BLD AUTO: 9.6 %
NEUTROPHILS # BLD AUTO: 5.55 X10*3/UL (ref 1.6–5.5)
NEUTROPHILS NFR BLD AUTO: 64.8 %
NON HDL CHOLESTEROL: 63 MG/DL (ref 0–149)
NRBC BLD-RTO: 0 /100 WBCS (ref 0–0)
PLATELET # BLD AUTO: 262 X10*3/UL (ref 150–450)
POTASSIUM SERPL-SCNC: 3.6 MMOL/L (ref 3.5–5.3)
PROT SERPL-MCNC: 6.5 G/DL (ref 6.4–8.2)
PROTHROMBIN TIME: 41.4 SECONDS (ref 9.8–12.8)
RBC # BLD AUTO: 3.71 X10*6/UL (ref 4–5.2)
SODIUM SERPL-SCNC: 133 MMOL/L (ref 136–145)
T4 FREE SERPL-MCNC: 1.35 NG/DL (ref 0.78–1.48)
TRIGL SERPL-MCNC: 57 MG/DL (ref 0–149)
TSH SERPL-ACNC: 4.7 MIU/L (ref 0.44–3.98)
VLDL: 11 MG/DL (ref 0–40)
WBC # BLD AUTO: 8.6 X10*3/UL (ref 4.4–11.3)

## 2024-02-24 PROCEDURE — 85610 PROTHROMBIN TIME: CPT

## 2024-02-24 PROCEDURE — 84443 ASSAY THYROID STIM HORMONE: CPT

## 2024-02-24 PROCEDURE — 83036 HEMOGLOBIN GLYCOSYLATED A1C: CPT

## 2024-02-24 PROCEDURE — 80061 LIPID PANEL: CPT

## 2024-02-24 PROCEDURE — 80053 COMPREHEN METABOLIC PANEL: CPT

## 2024-02-24 PROCEDURE — 83735 ASSAY OF MAGNESIUM: CPT

## 2024-02-24 PROCEDURE — 84439 ASSAY OF FREE THYROXINE: CPT

## 2024-02-24 PROCEDURE — 82550 ASSAY OF CK (CPK): CPT

## 2024-02-24 PROCEDURE — 36415 COLL VENOUS BLD VENIPUNCTURE: CPT

## 2024-02-24 PROCEDURE — 85025 COMPLETE CBC W/AUTO DIFF WBC: CPT

## 2024-03-01 ENCOUNTER — HOSPITAL ENCOUNTER (OUTPATIENT)
Dept: RADIOLOGY | Facility: CLINIC | Age: 89
Discharge: HOME | End: 2024-03-01
Payer: COMMERCIAL

## 2024-03-01 DIAGNOSIS — E03.4 HYPOTHYROIDISM DUE TO ACQUIRED ATROPHY OF THYROID: ICD-10-CM

## 2024-03-01 PROCEDURE — 76536 US EXAM OF HEAD AND NECK: CPT | Performed by: RADIOLOGY

## 2024-03-01 PROCEDURE — 76536 US EXAM OF HEAD AND NECK: CPT

## 2024-04-04 DIAGNOSIS — E03.4 HYPOTHYROIDISM DUE TO ACQUIRED ATROPHY OF THYROID: ICD-10-CM

## 2024-04-08 RX ORDER — LEVOTHYROXINE SODIUM 88 UG/1
TABLET ORAL
Qty: 90 TABLET | Refills: 1 | Status: SHIPPED | OUTPATIENT
Start: 2024-04-08

## 2024-04-25 ENCOUNTER — TELEPHONE (OUTPATIENT)
Dept: CARDIOLOGY | Facility: HOSPITAL | Age: 89
End: 2024-04-25
Payer: COMMERCIAL

## 2024-04-25 DIAGNOSIS — Z95.2 S/P AVR: ICD-10-CM

## 2024-04-25 NOTE — TELEPHONE ENCOUNTER
Received preop clearance form for patient pending dental extraction with Dr. Barry DMD in near future. Office is requesting patient hold Warfarin prior to extraction.     [facility]:  Patient is here for follow-up doing well from a cardiac standpoint status post previous aortic valve replacement. Not any symptoms of chest pain or shortness of breath. She is limited her activity. I discussed with the patient Kneifl continue medication we will obtain echocardiogram to evaluate her aortic valve. Will call for any problem and follow-up as scheduled.     Echo completed 9/29/23:  1. Left ventricular systolic function is normal with a 60-65% estimated ejection fraction.  2. Mild dynamic LVOT obstruction with peak/gradients = 15/3 mmHg with Valsalva.  3. Spectral Doppler shows an impaired relaxation pattern of left ventricular diastolic filling.  4. The left atrium is moderately dilated.  5. The mitral valve is severely thickened.  6. There is severe mitral annular calcification.  7. Moderate mitral valve regurgitation.  8. Moderate tricuspid regurgitation.  9. S/pAVR which appears well-seated with peak/mean gradients = 39/24mmHg, c/w mild prosthetic aortic stenosis. This is not significantly changed from 7/2022.  10. Moderately elevated pulmonary artery pressure.    Patient is on Warfarin s/p remote Aortic Valve Replacement.   8/4/2016 with Porcine Aortic Valve     Form placed for Dr. Apryl Parsons MD [facility] review today in clinic.

## 2024-04-25 NOTE — TELEPHONE ENCOUNTER
4/25  Patient's son, Delfino called office to inquire if paperwork for dentist was received.  I advised that it has been received and is awaiting Dr. Parsons's review.

## 2024-04-26 NOTE — TELEPHONE ENCOUNTER
Per Dr. Apryl Parsons MD [facility], patient cleared for dental work.   Patient to hold Warfarin x5 days pre procedure. No Lovenox Bridge needed.   Patient has Porcine valve- will need Amoxicillin 2grams orally 1 hour pre procedure.   No post procedure antibiotics are required from cardiology standpoint.     **Appears patient is allergic to PCN derivatives. Will ask Dr. Apryl Parsons MD [facility] for alternate antibiotic therapy.

## 2024-04-30 RX ORDER — DOXYCYCLINE 100 MG/1
CAPSULE ORAL
Qty: 1 CAPSULE | Refills: 3 | Status: SHIPPED | OUTPATIENT
Start: 2024-04-30

## 2024-04-30 NOTE — TELEPHONE ENCOUNTER
Apryl Parsons MD  You16 hours ago (4:11 PM)     She can have doxycycline 100 mg an hour prior to procedure

## 2024-04-30 NOTE — TELEPHONE ENCOUNTER
LM on VM for patients son/ Delfino with details regarding clearance and instructions for medications.   Will pend antibiotic to local pharmacy for patient.     Form signed and faxed with confirmation received, placed to scanning.

## 2024-05-04 ENCOUNTER — LAB (OUTPATIENT)
Dept: LAB | Facility: LAB | Age: 89
End: 2024-05-04
Payer: COMMERCIAL

## 2024-05-04 DIAGNOSIS — I48.0 PAROXYSMAL ATRIAL FIBRILLATION (MULTI): ICD-10-CM

## 2024-05-04 LAB
INR PPP: 2.1 (ref 0.9–1.1)
PROTHROMBIN TIME: 24.3 SECONDS (ref 9.8–12.8)

## 2024-05-04 PROCEDURE — 36415 COLL VENOUS BLD VENIPUNCTURE: CPT

## 2024-05-04 PROCEDURE — 85610 PROTHROMBIN TIME: CPT

## 2024-05-17 ENCOUNTER — OFFICE VISIT (OUTPATIENT)
Dept: CARDIOLOGY | Facility: CLINIC | Age: 89
End: 2024-05-17
Payer: COMMERCIAL

## 2024-05-17 VITALS
WEIGHT: 169.6 LBS | HEIGHT: 62 IN | DIASTOLIC BLOOD PRESSURE: 80 MMHG | BODY MASS INDEX: 31.21 KG/M2 | SYSTOLIC BLOOD PRESSURE: 110 MMHG | HEART RATE: 78 BPM

## 2024-05-17 DIAGNOSIS — Z78.9 NEVER SMOKED TOBACCO: ICD-10-CM

## 2024-05-17 DIAGNOSIS — Z95.2 S/P AVR: ICD-10-CM

## 2024-05-17 DIAGNOSIS — I25.10 2-VESSEL CORONARY ARTERY DISEASE: ICD-10-CM

## 2024-05-17 DIAGNOSIS — E78.2 MIXED HYPERLIPIDEMIA: ICD-10-CM

## 2024-05-17 DIAGNOSIS — R09.89 BILATERAL CAROTID BRUITS: ICD-10-CM

## 2024-05-17 DIAGNOSIS — I73.9 PERIPHERAL VASCULAR DISEASE (CMS-HCC): ICD-10-CM

## 2024-05-17 DIAGNOSIS — I48.0 PAROXYSMAL ATRIAL FIBRILLATION (MULTI): ICD-10-CM

## 2024-05-17 DIAGNOSIS — I10 ESSENTIAL HYPERTENSION: ICD-10-CM

## 2024-05-17 PROCEDURE — 1159F MED LIST DOCD IN RCRD: CPT | Performed by: INTERNAL MEDICINE

## 2024-05-17 PROCEDURE — 3074F SYST BP LT 130 MM HG: CPT | Performed by: INTERNAL MEDICINE

## 2024-05-17 PROCEDURE — 1160F RVW MEDS BY RX/DR IN RCRD: CPT | Performed by: INTERNAL MEDICINE

## 2024-05-17 PROCEDURE — 3079F DIAST BP 80-89 MM HG: CPT | Performed by: INTERNAL MEDICINE

## 2024-05-17 PROCEDURE — 1036F TOBACCO NON-USER: CPT | Performed by: INTERNAL MEDICINE

## 2024-05-17 PROCEDURE — 99213 OFFICE O/P EST LOW 20 MIN: CPT | Performed by: INTERNAL MEDICINE

## 2024-05-17 PROCEDURE — 1123F ACP DISCUSS/DSCN MKR DOCD: CPT | Performed by: INTERNAL MEDICINE

## 2024-05-17 PROCEDURE — 1157F ADVNC CARE PLAN IN RCRD: CPT | Performed by: INTERNAL MEDICINE

## 2024-05-17 RX ORDER — AMLODIPINE BESYLATE 10 MG/1
10 TABLET ORAL DAILY
Qty: 90 TABLET | Refills: 3 | Status: SHIPPED | OUTPATIENT
Start: 2024-05-17 | End: 2025-05-17

## 2024-05-17 RX ORDER — IBANDRONATE SODIUM 150 MG/1
TABLET, FILM COATED ORAL
COMMUNITY

## 2024-05-17 RX ORDER — METOPROLOL SUCCINATE 50 MG/1
50 TABLET, EXTENDED RELEASE ORAL 2 TIMES DAILY
Qty: 180 TABLET | Refills: 3 | Status: SHIPPED | OUTPATIENT
Start: 2024-05-17 | End: 2025-05-17

## 2024-05-17 NOTE — PATIENT INSTRUCTIONS
Continue same medications and treatments.   Patient educated on proper medication use.   Patient educated on risk factor modification.   Please bring any lab results from other providers / physicians to your next appointment.     Please bring all medicines, vitamins, and herbal supplements with you when you come to the office.     Prescriptions will not be filled unless you are compliant with your follow up appointments or have a follow up appointment scheduled as per instruction of your physician. Refills should be requested at the time of your visit.  9 month visit  Patient cleared to hold warfarin for dental extraction

## 2024-05-17 NOTE — PROGRESS NOTES
Referred by Dr. Alarcon ref. provider found provider found for   Chief Complaint   Patient presents with    Follow-up     Pt is here today following up after 9 months        History of Present Illness  Shona Everett is a 88 y.o. year old female patient doing well from a cardiac standpoint no complaint INR about 2.1.  Denies any symptoms of chest pain palpitations syncope or presyncope.  She is scheduled to have tooth extraction and she will hold her Coumadin 5 days preop and resume postop.  Discussed with the patient great length at this point continue medication will call for any problem and follow-up as scheduled    Past Medical History  Past Medical History:   Diagnosis Date    Dysuria     Dysuria    Nonrheumatic aortic (valve) stenosis 07/13/2016    Aortic stenosis, severe    Personal history of other diseases of the circulatory system 06/26/2016    History of coronary atherosclerosis    Personal history of other diseases of urinary system     History of hematuria    Personal history of other endocrine, nutritional and metabolic disease 07/13/2016    History of hyperlipidemia    Personal history of other specified conditions     History of chest pain    Personal history of other specified conditions 07/13/2016    History of shortness of breath       Social History  Social History     Tobacco Use    Smoking status: Never    Smokeless tobacco: Never   Vaping Use    Vaping status: Not on file   Substance Use Topics    Alcohol use: Never    Drug use: Never       Family History     Family History   Problem Relation Name Age of Onset    Heart attack Mother      Stroke Father      Other (dyslipidemia) Other Multiple Family Members     Other (cerebrovascular disorder) Other Multiple Family Members     Coronary artery disease Other Multiple Family Members     Hypertension Other Multiple Family Members     Lung disease Other Multiple Family Members        Review of Systems  As per HPI, all other systems reviewed and  negative.    Allergies:  Allergies   Allergen Reactions    Penicillins Anaphylaxis    Ampicillin Hives and Unknown        Outpatient Medications:  Current Outpatient Medications   Medication Instructions    amLODIPine (Norvasc) 10 mg tablet 1 tablet, oral, Daily, Take 1 tablet (10 mg) by mouth once daily.    atorvastatin (LIPITOR) 40 mg, oral, Daily    cholecalciferol (Vitamin D3) 1,250 mcg (50,000 unit) tablet Please take 1 tablet by mouth once a week, Sundays, with lunch and a full glass of water.  Thank you.    doxycycline (Monodox) 100 mg capsule Take one tablet one hour prior to dental work.    ibandronate (Boniva) 150 mg tablet TAKE 1 TABLET ONCE A MONTH    levothyroxine (Synthroid, Levoxyl) 88 mcg tablet PLEASE TAKE 1 TABLET BY MOUTH BEFORE BREAKFAST. THANK YOU.    losartan-hydrochlorothiazide (Hyzaar) 100-25 mg tablet 1 tablet, oral, Daily    magnesium oxide (Mag-Ox) 400 mg (241.3 mg magnesium) tablet Please take 1 tablet with breakfast, and again with supper, twice a day.  Always with food please.  Thank you.    metoprolol succinate XL (TOPROL-XL) 50 mg, oral, 2 times daily    nitroglycerin (Nitrostat) 0.4 mg SL tablet PLACE 1 TABLET UNDER THE TONGUE EVERY 5 MINUTES FOR UP TO 3 DOSES AS NEEDED FOR CHEST PAIN.CALL 911 IF PAIN PERSISTS.    sertraline (ZOLOFT) 25 mg, oral, Daily    warfarin (Coumadin) 2.5 mg tablet TAKE 1 TABLET DAILY OR AS DIRECTED BY EDGARDO BENITES         Vitals:  Vitals:    05/17/24 1416   BP: 110/80   Pulse: 78       Physical Exam:  Physical Exam  Cardiovascular:      Rate and Rhythm: Normal rate.      Pulses: Normal pulses.      Heart sounds: Normal heart sounds.   Pulmonary:      Effort: Pulmonary effort is normal.   Neurological:      General: No focal deficit present.      Mental Status: She is alert.             Assessment/Plan   Problem List Items Addressed This Visit       2-vessel coronary artery disease    Bilateral carotid bruits    Mixed hyperlipidemia    Paroxysmal atrial  fibrillation (Multi)    Peripheral vascular disease (CMS-HCC)    S/P AVR    Essential hypertension    BMI 31.0-31.9,adult    RESOLVED: Never smoked tobacco           Apryl Parsons MD [facility]  Interventional Cardiology   of [facility]     Thank you for allowing me to participate in the care of this patient. Please do not hesitate to contact me with any further questions or concerns.

## 2024-05-24 ENCOUNTER — OFFICE VISIT (OUTPATIENT)
Dept: PRIMARY CARE | Facility: CLINIC | Age: 89
End: 2024-05-24
Payer: COMMERCIAL

## 2024-05-24 VITALS
WEIGHT: 172 LBS | SYSTOLIC BLOOD PRESSURE: 113 MMHG | DIASTOLIC BLOOD PRESSURE: 71 MMHG | BODY MASS INDEX: 31.65 KG/M2 | HEIGHT: 62 IN | HEART RATE: 100 BPM

## 2024-05-24 DIAGNOSIS — R40.0 DAYTIME SLEEPINESS: ICD-10-CM

## 2024-05-24 DIAGNOSIS — R60.0 BILATERAL EDEMA OF LOWER EXTREMITY: ICD-10-CM

## 2024-05-24 DIAGNOSIS — E78.2 MIXED HYPERLIPIDEMIA: ICD-10-CM

## 2024-05-24 DIAGNOSIS — G30.1 DEMENTIA OF THE ALZHEIMER'S TYPE WITH LATE ONSET WITHOUT BEHAVIORAL DISTURBANCE (MULTI): ICD-10-CM

## 2024-05-24 DIAGNOSIS — I10 ESSENTIAL HYPERTENSION: ICD-10-CM

## 2024-05-24 DIAGNOSIS — N18.31 CHRONIC KIDNEY DISEASE, STAGE 3A (MULTI): ICD-10-CM

## 2024-05-24 DIAGNOSIS — R73.9 HYPERGLYCEMIA: ICD-10-CM

## 2024-05-24 DIAGNOSIS — D50.9 IRON DEFICIENCY ANEMIA, UNSPECIFIED IRON DEFICIENCY ANEMIA TYPE: ICD-10-CM

## 2024-05-24 DIAGNOSIS — M60.19 INTERSTITIAL MYOSITIS OF MULTIPLE SITES: ICD-10-CM

## 2024-05-24 DIAGNOSIS — F02.80 DEMENTIA OF THE ALZHEIMER'S TYPE WITH LATE ONSET WITHOUT BEHAVIORAL DISTURBANCE (MULTI): ICD-10-CM

## 2024-05-24 DIAGNOSIS — E55.9 VITAMIN D DEFICIENCY: ICD-10-CM

## 2024-05-24 DIAGNOSIS — E03.4 HYPOTHYROIDISM DUE TO ACQUIRED ATROPHY OF THYROID: ICD-10-CM

## 2024-05-24 PROCEDURE — 1160F RVW MEDS BY RX/DR IN RCRD: CPT | Performed by: INTERNAL MEDICINE

## 2024-05-24 PROCEDURE — 1036F TOBACCO NON-USER: CPT | Performed by: INTERNAL MEDICINE

## 2024-05-24 PROCEDURE — 3078F DIAST BP <80 MM HG: CPT | Performed by: INTERNAL MEDICINE

## 2024-05-24 PROCEDURE — 3074F SYST BP LT 130 MM HG: CPT | Performed by: INTERNAL MEDICINE

## 2024-05-24 PROCEDURE — 99213 OFFICE O/P EST LOW 20 MIN: CPT | Performed by: INTERNAL MEDICINE

## 2024-05-24 PROCEDURE — 1123F ACP DISCUSS/DSCN MKR DOCD: CPT | Performed by: INTERNAL MEDICINE

## 2024-05-24 PROCEDURE — 1157F ADVNC CARE PLAN IN RCRD: CPT | Performed by: INTERNAL MEDICINE

## 2024-05-24 PROCEDURE — 1159F MED LIST DOCD IN RCRD: CPT | Performed by: INTERNAL MEDICINE

## 2024-05-24 NOTE — PATIENT INSTRUCTIONS
Thank you much for coming.  I am very happy to see you!    I am happy to hear that you are having fun driving around with your son!  You even enjoyed the convertible, although I think the blue car with a hard top looks nicer!    I do understand that your shoes seem to fit a little bit more snugly because of some swelling.  Please drink lots of fluids throughout the day, very helpful.  Please elevate your legs whenever you are seated for prolonged periods of time.    I am glad that you saw your HEART SPECIALIST, and that you have been doing well.  You will be due for repeat FASTING laboratory examinations in about 6 weeks, then see me soon after for your yearly PHYSICAL examination.    Until then, please continue to take care of yourself and each other, and please continue to pray for our recovery from this pandemic.  I hope you have a good summer and a good drive!  Take care and God bless.    Please get yourself vaccinated for SHINGLES.  Shingrix comes in a series of 2 injections at least 1 month apart.  Highly recommended by Medicare, and will be paid for by your insurance!  Please get this done at any pharmacy of choice!            0  Return in 6 weeks.  40 minutes please.  FASTING laboratory examinations via [facility], then see me for yearly'S call examination.  Coordinate with cardiology, neurology.  Rule out self-neglect, caregiver stress.  Review preventive strategies, cardiovascular risk.            0

## 2024-06-09 DIAGNOSIS — I48.0 PAROXYSMAL ATRIAL FIBRILLATION (MULTI): ICD-10-CM

## 2024-06-10 DIAGNOSIS — G30.1 DEMENTIA OF THE ALZHEIMER'S TYPE WITH LATE ONSET WITHOUT BEHAVIORAL DISTURBANCE (MULTI): ICD-10-CM

## 2024-06-10 DIAGNOSIS — F02.80 DEMENTIA OF THE ALZHEIMER'S TYPE WITH LATE ONSET WITHOUT BEHAVIORAL DISTURBANCE (MULTI): ICD-10-CM

## 2024-06-11 RX ORDER — WARFARIN 2.5 MG/1
TABLET ORAL
Qty: 90 TABLET | Refills: 1 | Status: SHIPPED | OUTPATIENT
Start: 2024-06-11

## 2024-06-11 RX ORDER — SERTRALINE HYDROCHLORIDE 25 MG/1
25 TABLET, FILM COATED ORAL DAILY
Qty: 90 TABLET | Refills: 0 | Status: SHIPPED | OUTPATIENT
Start: 2024-06-11

## 2024-06-17 ENCOUNTER — TELEPHONE (OUTPATIENT)
Dept: CARDIOLOGY | Facility: CLINIC | Age: 89
End: 2024-06-17
Payer: COMMERCIAL

## 2024-06-17 DIAGNOSIS — I10 ESSENTIAL HYPERTENSION: ICD-10-CM

## 2024-06-17 NOTE — TELEPHONE ENCOUNTER
Called and spoke with patient and son on speaker. Advised to trail reducing Amlodipine to 5mg once daily and monitor swelling. Call in 1 week with updates.     Med list updated- will send in 5mg tablets in a week if symptoms improved and patient tolerating well.

## 2024-06-17 NOTE — TELEPHONE ENCOUNTER
Patient son called and stated patient feet are swollen just as much in the morning and at night. Adela Given, LPN

## 2024-06-17 NOTE — TELEPHONE ENCOUNTER
Per Dr. Apryl Parsons MD [facility], patient to reduce Amlodipine to 5mg once daily.

## 2024-06-17 NOTE — TELEPHONE ENCOUNTER
Patient's son, Delfino, called office stating that over the past couple of weeks they have noticed his mom's legs swelling.  Started with ankles a couple weeks ago.  Now, top of feet and lower legs from the knees down swell.  She does not weigh herself, so he does not know if there has been any weight gain.  She is sedentary and sits with her legs down watching TV a lot.  He is unaware if her legs are better in the morning and swell as the day goes on.  He will check with his wife on this and get back to the office with this information.  Advised I would print the message for Dr. Parsons to review tomorrow and his nurse will get back to them.

## 2024-06-18 RX ORDER — AMLODIPINE BESYLATE 10 MG/1
5 TABLET ORAL DAILY
Qty: 45 TABLET | Refills: 3 | OUTPATIENT
Start: 2024-06-18 | End: 2025-06-18

## 2024-06-25 ENCOUNTER — LAB (OUTPATIENT)
Dept: LAB | Facility: LAB | Age: 89
End: 2024-06-25
Payer: COMMERCIAL

## 2024-06-25 ENCOUNTER — TELEPHONE (OUTPATIENT)
Dept: PRIMARY CARE | Facility: CLINIC | Age: 89
End: 2024-06-25
Payer: COMMERCIAL

## 2024-06-25 DIAGNOSIS — N39.0 RECURRENT UTI: ICD-10-CM

## 2024-06-25 LAB
APPEARANCE UR: ABNORMAL
BACTERIA #/AREA URNS AUTO: ABNORMAL /HPF
BILIRUB UR STRIP.AUTO-MCNC: NEGATIVE MG/DL
COLOR UR: YELLOW
GLUCOSE UR STRIP.AUTO-MCNC: NORMAL MG/DL
KETONES UR STRIP.AUTO-MCNC: NEGATIVE MG/DL
LEUKOCYTE ESTERASE UR QL STRIP.AUTO: ABNORMAL
NITRITE UR QL STRIP.AUTO: ABNORMAL
PH UR STRIP.AUTO: 7.5 [PH]
PROT UR STRIP.AUTO-MCNC: NEGATIVE MG/DL
RBC # UR STRIP.AUTO: NEGATIVE /UL
RBC #/AREA URNS AUTO: ABNORMAL /HPF
SP GR UR STRIP.AUTO: 1.01
SQUAMOUS #/AREA URNS AUTO: ABNORMAL /HPF
UROBILINOGEN UR STRIP.AUTO-MCNC: NORMAL MG/DL
WBC #/AREA URNS AUTO: >50 /HPF
WBC CLUMPS #/AREA URNS AUTO: ABNORMAL /HPF

## 2024-06-25 PROCEDURE — 87186 SC STD MICRODIL/AGAR DIL: CPT

## 2024-06-25 PROCEDURE — 81001 URINALYSIS AUTO W/SCOPE: CPT

## 2024-06-25 PROCEDURE — 87086 URINE CULTURE/COLONY COUNT: CPT

## 2024-06-25 NOTE — TELEPHONE ENCOUNTER
Pt's so  called in says Shona its demonstrating symptoms that signal she possibly has  a UTI.  They would like an order to leave sample at [facility] lab.

## 2024-06-26 LAB — HOLD SPECIMEN: NORMAL

## 2024-06-28 ENCOUNTER — TELEPHONE (OUTPATIENT)
Dept: PRIMARY CARE | Facility: CLINIC | Age: 89
End: 2024-06-28
Payer: COMMERCIAL

## 2024-06-28 LAB — BACTERIA UR CULT: ABNORMAL

## 2024-06-28 NOTE — TELEPHONE ENCOUNTER
Son is calling regarding urine test results.   Please advise says mom is not acting right, doesn't want it to get worse.

## 2024-07-01 DIAGNOSIS — N39.0 URINARY TRACT INFECTION WITHOUT HEMATURIA, SITE UNSPECIFIED: Primary | ICD-10-CM

## 2024-07-01 RX ORDER — NITROFURANTOIN 25; 75 MG/1; MG/1
100 CAPSULE ORAL 2 TIMES DAILY
Qty: 10 CAPSULE | Refills: 0 | Status: SHIPPED | OUTPATIENT
Start: 2024-07-01 | End: 2024-07-06

## 2024-07-08 NOTE — TELEPHONE ENCOUNTER
Delfino, the patient's son called and LM stating the patient is retaining a lot of water in legs. This nurse called the patient back to obtain more information. After medication changes a couple weeks ago, the swelling went down in her ankles just a little bit, but her legs right below and above her knee are still swollen. Patient now keeps legs elevated but still does not weigh herself in the mornings. Son would like to know if they should make an appointment or just change medications again. Routed to Reyes Morales RN

## 2024-07-09 NOTE — TELEPHONE ENCOUNTER
LM for patient son Delfino to return call to discuss.   Please give son message from Dr. Apryl Parsons MD [facility] (covering for CDO).     Update med list once son is aware.

## 2024-07-09 NOTE — TELEPHONE ENCOUNTER
Apryl Parsons MD  You1 hour ago (3:12 PM)     Hold amlodipine, Lasix 20 mg daily as needed for leg swelling

## 2024-07-10 NOTE — TELEPHONE ENCOUNTER
Patient son informed per Dr. Apryl Parsons MD, FACC patient is to hold amlodipine and start Lasix 20 mg daily as needed for leg swelling. Patient son verbalized understanding. Medication changes routed to Dr. Apryl Parsons MD, FACC for signing.

## 2024-07-12 ENCOUNTER — APPOINTMENT (OUTPATIENT)
Dept: PRIMARY CARE | Facility: CLINIC | Age: 89
End: 2024-07-12
Payer: COMMERCIAL

## 2024-07-19 ENCOUNTER — APPOINTMENT (OUTPATIENT)
Dept: PRIMARY CARE | Facility: CLINIC | Age: 89
End: 2024-07-19
Payer: COMMERCIAL

## 2024-07-19 VITALS
SYSTOLIC BLOOD PRESSURE: 115 MMHG | HEART RATE: 73 BPM | DIASTOLIC BLOOD PRESSURE: 46 MMHG | BODY MASS INDEX: 29.92 KG/M2 | HEIGHT: 62 IN | OXYGEN SATURATION: 95 % | WEIGHT: 162.6 LBS

## 2024-07-19 DIAGNOSIS — R26.0 ATAXIC GAIT: ICD-10-CM

## 2024-07-19 DIAGNOSIS — I10 ESSENTIAL HYPERTENSION: ICD-10-CM

## 2024-07-19 DIAGNOSIS — I48.0 PAROXYSMAL ATRIAL FIBRILLATION (MULTI): ICD-10-CM

## 2024-07-19 DIAGNOSIS — D50.9 IRON DEFICIENCY ANEMIA, UNSPECIFIED IRON DEFICIENCY ANEMIA TYPE: ICD-10-CM

## 2024-07-19 DIAGNOSIS — R40.0 DAYTIME SLEEPINESS: ICD-10-CM

## 2024-07-19 DIAGNOSIS — R53.81 DEBILITY: Primary | ICD-10-CM

## 2024-07-19 DIAGNOSIS — M81.0 AGE-RELATED OSTEOPOROSIS WITHOUT CURRENT PATHOLOGICAL FRACTURE: ICD-10-CM

## 2024-07-19 DIAGNOSIS — E55.9 VITAMIN D DEFICIENCY: ICD-10-CM

## 2024-07-19 DIAGNOSIS — B35.1 ONYCHOMYCOSIS: ICD-10-CM

## 2024-07-19 DIAGNOSIS — E03.4 HYPOTHYROIDISM DUE TO ACQUIRED ATROPHY OF THYROID: ICD-10-CM

## 2024-07-19 DIAGNOSIS — F02.80 DEMENTIA OF THE ALZHEIMER'S TYPE WITH LATE ONSET WITHOUT BEHAVIORAL DISTURBANCE (MULTI): ICD-10-CM

## 2024-07-19 DIAGNOSIS — N18.31 CHRONIC KIDNEY DISEASE, STAGE 3A (MULTI): ICD-10-CM

## 2024-07-19 DIAGNOSIS — E78.2 MIXED HYPERLIPIDEMIA: ICD-10-CM

## 2024-07-19 DIAGNOSIS — G30.1 DEMENTIA OF THE ALZHEIMER'S TYPE WITH LATE ONSET WITHOUT BEHAVIORAL DISTURBANCE (MULTI): ICD-10-CM

## 2024-07-19 DIAGNOSIS — R13.13 PHARYNGEAL DYSPHAGIA: ICD-10-CM

## 2024-07-19 DIAGNOSIS — M60.19 INTERSTITIAL MYOSITIS OF MULTIPLE SITES: ICD-10-CM

## 2024-07-19 DIAGNOSIS — R60.0 BILATERAL EDEMA OF LOWER EXTREMITY: ICD-10-CM

## 2024-07-19 DIAGNOSIS — R73.9 HYPERGLYCEMIA: ICD-10-CM

## 2024-07-19 PROCEDURE — 1123F ACP DISCUSS/DSCN MKR DOCD: CPT | Performed by: INTERNAL MEDICINE

## 2024-07-19 PROCEDURE — 1157F ADVNC CARE PLAN IN RCRD: CPT | Performed by: INTERNAL MEDICINE

## 2024-07-19 PROCEDURE — 99214 OFFICE O/P EST MOD 30 MIN: CPT | Performed by: INTERNAL MEDICINE

## 2024-07-19 PROCEDURE — 3078F DIAST BP <80 MM HG: CPT | Performed by: INTERNAL MEDICINE

## 2024-07-19 PROCEDURE — 3074F SYST BP LT 130 MM HG: CPT | Performed by: INTERNAL MEDICINE

## 2024-07-19 PROCEDURE — 1159F MED LIST DOCD IN RCRD: CPT | Performed by: INTERNAL MEDICINE

## 2024-07-19 RX ORDER — IBANDRONATE SODIUM 150 MG/1
150 TABLET, FILM COATED ORAL
Qty: 2 TABLET | Refills: 0 | Status: SHIPPED | OUTPATIENT
Start: 2024-07-19 | End: 2024-09-17

## 2024-07-19 NOTE — PROGRESS NOTES
Subjective   Patient ID: Shona Everett is a 88 y.o. female who presents for Follow-up (Patient presented today for a 6 week follow up.).    HPI   Son in attendance, pointing out that the patient has been shuffling between living with him and with his sister.  When with him, he states that his mother is in a relatively quiet environment, but may in turn be lacking stimulation.  On the other hand, when with his sister, his nieces and nephews do offer her some stimulation, sometimes perhaps too much stimulation, such that the patient does not seem to enjoy it.  She herself does not have any complaints that she recalls.    Also, noted to have increased SOMNOLENCE.  Some days, she seemingly cannot stay awake through breakfast.  Some days she seems to be sleeping 20 hours a day.  Again, the patient herself has no particular recollection and no particular complaint.  She denies headache, blurred vision, diplopia.  No apparent focal weakness.  She does state that she has fallen multiple times, but the son and daughter-in-law state that perhaps she had only 1 episode.    The patient has had trouble SWALLOWING pills as of late.  Otherwise, not choking on food.  Continues to want to improve quality of life, it seems, and does not wish harm to self or others.  Not particularly depressive.    With bilateral edema, shortness of breath, dyspnea on exertion, the patient was evaluated by CARDIOLOGY.  AMLODIPINE was decreased,, and eventually stopped, with diuretic added to regimen in the hopes that patient's bilateral edema might be more controlled.  The patient states no chest pain, no leg pain.  Likewise, oh perhaps occasional cough, but no particular sputum production.  Not observed to have any constitutional signs or symptoms.  CONSTITUTIONALLY, no fever, no chills.  No night sweats.  No lingering anorexia or nausea.  No apparent lymphadenopathy.  No apparent weight loss.    The patient seems happy to be here, but she herself  "does not recall quite why she is here.  She is due to see NEUROLOGY sometime soon.  Family members are looking forward to this.        Review of Systems  Review of systems as in history of present illness, and otherwise, reviewed separately as well, and was unremarkable/negative/noncontributory.        Objective   BP (!) 115/46 (BP Location: Left arm, Patient Position: Sitting, BP Cuff Size: Adult)   Pulse 73   Ht 1.562 m (5' 1.5\")   Wt 73.8 kg (162 lb 9.6 oz)   SpO2 95%   BMI 30.23 kg/m²     Physical Exam  Seemingly in good spirits.  Not in distress or diaphoresis.  Speaking in short but complete sentences.  Receptive, oriented to person and place.  Amiable.  Not unkempt.  Moderately built.  Seemingly does want to maintain and perhaps even improved quality of life.  Seemingly appropriate.    HEAD pink palpebral conjunctivae, anicteric sclerae.  NECK supple, no apparent jugular venous distention.  CARDIOVASCULAR not in distress or diaphoresis.  No bipedal edema.  LUNGS not in distress or diaphoresis.  Not using accessory muscles.  ABDOMEN soft, nontender.  BACK no costovertebral angle tenderness.  EXTREMITIES no clubbing, no cyanosis.  NEURO no facial asymmetry.  No apparent cranial nerve deficits.  Romberg negative.  Ambulating without need of assistance.  No apparent focal weakness.  No tremors.  PSYCH receptive, appropriate, and eager to maintain and improve quality of life.        LABORATORY results reviewed, needing updating sometime soon.        Assessment/Plan   Diagnoses and all orders for this visit:  Debility  -     TSH with reflex to Free T4 if abnormal; Future  -     Vitamin B12; Future  -     Folate; Future  -     Follow Up In Primary Care - Established; Future  Pharyngeal dysphagia  -     TSH with reflex to Free T4 if abnormal; Future  -     Follow Up In Primary Care - Established; Future  Daytime sleepiness  -     TSH with reflex to Free T4 if abnormal; Future  -     Vitamin B12; Future  -     " Folate; Future  -     Follow Up In Primary Care - Established; Future  Bilateral edema of lower extremity  -     Comprehensive Metabolic Panel; Future  -     TSH with reflex to Free T4 if abnormal; Future  -     Referral to Podiatry; Future  -     Follow Up In Primary Care - Established; Future  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)  -     Follow Up In Primary Care - Established  -     TSH with reflex to Free T4 if abnormal; Future  -     Vitamin B12; Future  -     Folate; Future  -     Follow Up In Primary Care - Established; Future  Hypothyroidism due to acquired atrophy of thyroid  -     TSH with reflex to Free T4 if abnormal; Future  -     Follow Up In Primary Care - Established; Future  Iron deficiency anemia, unspecified iron deficiency anemia type  -     CBC and Auto Differential; Future  -     Vitamin B12; Future  -     Folate; Future  -     Ferritin; Future  -     Iron and TIBC; Future  -     Follow Up In Primary Care - Established; Future  Interstitial myositis of multiple sites  -     Comprehensive Metabolic Panel; Future  -     Creatine Kinase; Future  -     Follow Up In Primary Care - Established; Future  Essential hypertension  -     CBC and Auto Differential; Future  -     Comprehensive Metabolic Panel; Future  -     TSH with reflex to Free T4 if abnormal; Future  -     Magnesium; Future  -     Urinalysis with Reflex Culture and Microscopic; Future  -     Creatine Kinase; Future  -     Follow Up In Primary Care - Established; Future  Hyperglycemia  -     Comprehensive Metabolic Panel; Future  -     Urinalysis with Reflex Culture and Microscopic; Future  -     Hemoglobin A1C; Future  -     Follow Up In Primary Care - Established; Future  Mixed hyperlipidemia  -     Comprehensive Metabolic Panel; Future  -     Lipid Panel; Future  -     Follow Up In Primary Care - Established; Future  Paroxysmal atrial fibrillation (Multi)  -     CBC and Auto  Differential; Future  -     Comprehensive Metabolic Panel; Future  -     TSH with reflex to Free T4 if abnormal; Future  -     Magnesium; Future  -     Follow Up In Primary Care - Established; Future  Chronic kidney disease, stage 3a (Multi)  -     CBC and Auto Differential; Future  -     Comprehensive Metabolic Panel; Future  -     Magnesium; Future  -     Urinalysis with Reflex Culture and Microscopic; Future  -     Uric Acid; Future  -     Albumin-Creatinine Ratio, Urine Random; Future  -     Follow Up In Primary Care - Established; Future  Age-related osteoporosis without current pathological fracture  -     ibandronate (Boniva) 150 mg tablet; Take 1 tablet (150 mg) by mouth every 30 (thirty) days. Take in morning with full glass of water on an empty stomach. No food, drink, meds, or lying down for 60 minutes after.  -     Comprehensive Metabolic Panel; Future  -     TSH with reflex to Free T4 if abnormal; Future  -     Vitamin D 25-Hydroxy,Total (for eval of Vitamin D levels); Future  -     Referral to Podiatry; Future  -     Follow Up In Primary Care - Established; Future  Ataxic gait  -     ibandronate (Boniva) 150 mg tablet; Take 1 tablet (150 mg) by mouth every 30 (thirty) days. Take in morning with full glass of water on an empty stomach. No food, drink, meds, or lying down for 60 minutes after.  -     Vitamin B12; Future  -     Folate; Future  -     Vitamin D 25-Hydroxy,Total (for eval of Vitamin D levels); Future  -     Referral to Podiatry; Future  -     Follow Up In Primary Care - Established; Future  Vitamin D deficiency  -     ibandronate (Boniva) 150 mg tablet; Take 1 tablet (150 mg) by mouth every 30 (thirty) days. Take in morning with full glass of water on an empty stomach. No food, drink, meds, or lying down for 60 minutes after.  -     Vitamin D 25-Hydroxy,Total (for eval of Vitamin D levels); Future  -     Follow Up In Primary Care - Established; Future  Onychomycosis  -     Referral to  Podiatry; Future  -     Follow Up In Primary Care - Established; Future       Thank you very much for coming.  I am very happy to see you.    I am glad that you did not do your BLOOD and URINE examinations yet, because you have had some changes in behavior, and some new medications.    Please wait 1 more week, then go ahead and do your FASTING laboratory examinations at [place].  I will send word regarding results and possible changes.    Please continue to take your WATER PILL as recommended by CARDIOLOGY.  No more AMLODIPINE.  This medication can contribute to SWELLING.    I do understand that you are having trouble with pills.  Try and see if you can switch out the over-the-counter regimens and instead take some GUMMIES.  Please check out the children's aisle for children's multivitamins.    The next time you  your medications, try and ask the PHARMACIST if there are other options for MAGNESIUM.  I do understand that you are having a hard time swallowing these pills as well.    Let us check a few markers in your blood and urine that might be contributing to your tiredness and sleepiness.  I will send blood regarding results and possible changes.    It would be very helpful to see the NEUROLOGIST.  Please keep your appointment.    Drink lots of fluids throughout the day.  Avoid excessive salt.  Elevate your legs whenever possible.    Again, let me call you with results and possible changes.    I have ordered your BONIVA, which you take only once a month.  After 2 months, lets see how you are doing with your medication.    Watch out for ACID INDIGESTION symptoms.  Call me if this is a significant issue.    Again, lots of fluids throughout the day, and avoid salt.  Very good for your KIDNEYS.    Again, thank you very much for coming.  Thank you very much for taking care of yourself and each other, and thank you for praying for our recovery from this pandemic.  Take care and God bless!  See you in 2  months.    It is recommended that you get yourself VACCINATED for SHINGLES.  SHINGRIX comes in a series of 2 injections at least 1 month apart.  Please get this from your local friendly pharmacy.  Highly recommended by Medicare, and to be paid for by your insurance.  Again, highly recommended.    Let us avail of the help of a local FOOT SPECIALIST.            0  Return in 2 months.  40 minutes please.  Reassess debility, history of DYSPHAGIA.  Coordinate with NEUROLOGY, reassess mood, energy function.  Coordinate with cardiology, podiatry.  Rule out self-neglect, caregiver stress of son.            0

## 2024-07-19 NOTE — PATIENT INSTRUCTIONS
Thank you very much for coming.  I am very happy to see you.    I am glad that you did not do your BLOOD and URINE examinations yet, because you have had some changes in behavior, and some new medications.    Please wait 1 more week, then go ahead and do your FASTING laboratory examinations at [place].  I will send word regarding results and possible changes.    Please continue to take your WATER PILL as recommended by CARDIOLOGY.  No more AMLODIPINE.  This medication can contribute to SWELLING.    I do understand that you are having trouble with pills.  Try and see if you can switch out the over-the-counter regimens and instead take some GUMMIES.  Please check out the children's aisle for children's multivitamins.    The next time you  your medications, try and ask the PHARMACIST if there are other options for MAGNESIUM.  I do understand that you are having a hard time swallowing these pills as well.    Let us check a few markers in your blood and urine that might be contributing to your tiredness and sleepiness.  I will send blood regarding results and possible changes.    It would be very helpful to see the NEUROLOGIST.  Please keep your appointment.    Drink lots of fluids throughout the day.  Avoid excessive salt.  Elevate your legs whenever possible.    Again, let me call you with results and possible changes.    I have ordered your BONIVA, which you take only once a month.  After 2 months, lets see how you are doing with your medication.    Watch out for ACID INDIGESTION symptoms.  Call me if this is a significant issue.    Again, lots of fluids throughout the day, and avoid salt.  Very good for your KIDNEYS.    Again, thank you very much for coming.  Thank you very much for taking care of yourself and each other, and thank you for praying for our recovery from this pandemic.  Take care and God bless!  See you in 2 months.    It is recommended that you get yourself VACCINATED for SHINGLES.   SHINGRIX comes in a series of 2 injections at least 1 month apart.  Please get this from your local friendly pharmacy.  Highly recommended by Medicare, and to be paid for by your insurance.  Again, highly recommended.    Let Mayela of the help of a local FOOT SPECIALIST.            0  Return in 2 months.  40 minutes please.  Reassess debility, history of DYSPHAGIA.  Coordinate with NEUROLOGY, reassess mood, energy function.  Coordinate with cardiology, podiatry.  Rule out self-neglect, caregiver stress of son.            0

## 2024-07-23 DIAGNOSIS — E83.42 HYPOMAGNESEMIA: ICD-10-CM

## 2024-07-23 DIAGNOSIS — E55.9 VITAMIN D DEFICIENCY: ICD-10-CM

## 2024-07-23 DIAGNOSIS — G30.1 DEMENTIA OF THE ALZHEIMER'S TYPE WITH LATE ONSET WITHOUT BEHAVIORAL DISTURBANCE (MULTI): ICD-10-CM

## 2024-07-23 DIAGNOSIS — F02.80 DEMENTIA OF THE ALZHEIMER'S TYPE WITH LATE ONSET WITHOUT BEHAVIORAL DISTURBANCE (MULTI): ICD-10-CM

## 2024-07-23 DIAGNOSIS — E78.2 MIXED HYPERLIPIDEMIA: ICD-10-CM

## 2024-07-23 RX ORDER — ATORVASTATIN CALCIUM 40 MG/1
40 TABLET, FILM COATED ORAL DAILY
Qty: 90 TABLET | Refills: 3 | Status: SHIPPED | OUTPATIENT
Start: 2024-07-23

## 2024-07-23 NOTE — TELEPHONE ENCOUNTER
Received request for prescription refills for patient.   Patient follows with Dr. Apryl Parsons MD, [facility]      Request is for Atorvastatin  Is patient currently on medication yes    Last OV 5/17/24  Next OV 2/21/25    Pended for signing and sent to provider

## 2024-07-24 RX ORDER — ASPIRIN 325 MG
TABLET, DELAYED RELEASE (ENTERIC COATED) ORAL
Qty: 13 CAPSULE | Refills: 0 | Status: SHIPPED | OUTPATIENT
Start: 2024-07-24

## 2024-07-24 RX ORDER — SERTRALINE HYDROCHLORIDE 25 MG/1
25 TABLET, FILM COATED ORAL DAILY
Qty: 90 TABLET | Refills: 0 | Status: SHIPPED | OUTPATIENT
Start: 2024-07-24

## 2024-07-24 RX ORDER — LANOLIN ALCOHOL/MO/W.PET/CERES
CREAM (GRAM) TOPICAL
Qty: 180 TABLET | Refills: 1 | Status: SHIPPED | OUTPATIENT
Start: 2024-07-24

## 2024-07-29 ENCOUNTER — APPOINTMENT (OUTPATIENT)
Dept: NEUROLOGY | Facility: CLINIC | Age: 89
End: 2024-07-29
Payer: COMMERCIAL

## 2024-07-29 VITALS
BODY MASS INDEX: 32 KG/M2 | WEIGHT: 163 LBS | DIASTOLIC BLOOD PRESSURE: 80 MMHG | TEMPERATURE: 97.7 F | SYSTOLIC BLOOD PRESSURE: 104 MMHG | HEART RATE: 86 BPM | HEIGHT: 60 IN

## 2024-07-29 DIAGNOSIS — R26.0 ATAXIC GAIT: ICD-10-CM

## 2024-07-29 DIAGNOSIS — G30.1 DEMENTIA OF THE ALZHEIMER'S TYPE WITH LATE ONSET WITHOUT BEHAVIORAL DISTURBANCE (MULTI): ICD-10-CM

## 2024-07-29 DIAGNOSIS — F02.80 DEMENTIA OF THE ALZHEIMER'S TYPE WITH LATE ONSET WITHOUT BEHAVIORAL DISTURBANCE (MULTI): ICD-10-CM

## 2024-07-29 PROCEDURE — 3079F DIAST BP 80-89 MM HG: CPT | Performed by: STUDENT IN AN ORGANIZED HEALTH CARE EDUCATION/TRAINING PROGRAM

## 2024-07-29 PROCEDURE — 1123F ACP DISCUSS/DSCN MKR DOCD: CPT | Performed by: STUDENT IN AN ORGANIZED HEALTH CARE EDUCATION/TRAINING PROGRAM

## 2024-07-29 PROCEDURE — 99204 OFFICE O/P NEW MOD 45 MIN: CPT | Performed by: STUDENT IN AN ORGANIZED HEALTH CARE EDUCATION/TRAINING PROGRAM

## 2024-07-29 PROCEDURE — 1159F MED LIST DOCD IN RCRD: CPT | Performed by: STUDENT IN AN ORGANIZED HEALTH CARE EDUCATION/TRAINING PROGRAM

## 2024-07-29 PROCEDURE — 3074F SYST BP LT 130 MM HG: CPT | Performed by: STUDENT IN AN ORGANIZED HEALTH CARE EDUCATION/TRAINING PROGRAM

## 2024-07-29 PROCEDURE — 1157F ADVNC CARE PLAN IN RCRD: CPT | Performed by: STUDENT IN AN ORGANIZED HEALTH CARE EDUCATION/TRAINING PROGRAM

## 2024-07-29 PROCEDURE — 1036F TOBACCO NON-USER: CPT | Performed by: STUDENT IN AN ORGANIZED HEALTH CARE EDUCATION/TRAINING PROGRAM

## 2024-07-29 ASSESSMENT — LIFESTYLE VARIABLES
HOW OFTEN DO YOU HAVE A DRINK CONTAINING ALCOHOL: NEVER
AUDIT-C TOTAL SCORE: 0
SKIP TO QUESTIONS 9-10: 1
HOW OFTEN DO YOU HAVE SIX OR MORE DRINKS ON ONE OCCASION: NEVER
HOW MANY STANDARD DRINKS CONTAINING ALCOHOL DO YOU HAVE ON A TYPICAL DAY: PATIENT DOES NOT DRINK

## 2024-07-29 ASSESSMENT — PATIENT HEALTH QUESTIONNAIRE - PHQ9
1. LITTLE INTEREST OR PLEASURE IN DOING THINGS: NOT AT ALL
SUM OF ALL RESPONSES TO PHQ9 QUESTIONS 1 & 2: 0
2. FEELING DOWN, DEPRESSED OR HOPELESS: NOT AT ALL

## 2024-07-29 ASSESSMENT — ENCOUNTER SYMPTOMS
DEPRESSION: 0
LOSS OF SENSATION IN FEET: 0
OCCASIONAL FEELINGS OF UNSTEADINESS: 1

## 2024-07-29 NOTE — PROGRESS NOTES
Subjective     Shona Everett is a 88 y.o. year old female who presents for evaluation of Alzheimer's dementia. She is accompanied to the clinic by her son and daughter in law.    She had a fall in 2022 and family noticed memory problems back then. She had a hip fracture requiring operative repair. She had delirium and had more severe memory problems around then. But they noticed memory problems a few years prior when she had heart surgery, she had delirium for 1-2 weeks after that.     She was evaluated by Dr. 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 She moved in with her son after her hip fracture. She was having trouble remembering to take her pills.     She has good days and bad days. She will also sundowns and becomes more confused. She is no longer driving, requires assistance with medications and her son took over the finances. She has a good appetite but they cook all of her meals. She requires some assistance with picking out clothes or reminder her to change to Pjs and change into regular clothes for the day. She has assistance with bathing sporadically with the assistance of one of her other daughters. She has urinary incontinence constantly, this started the last month. She is now wearing depends.     She can recognize her family members. She recognized her daughter and grandchildren from out of town recently. She has trouble engaging in conversations. She has no hearing loss but more trouble with comprehending the noises. She ambulates with a walker.     She has no paranoia, hallucinations or physical aggression. She does fight taking her medications but it is due to difficulty swallowing.     She tried donepezil and this sol her symptoms worse including increased confusion. This resolved with discontinuing the medications. She is on sertraline to help with sleep at night.     She has atrial fibrillation on warfarin, HTN,  HLD.     She has no family history of memory loss.    She is . 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 She is a never smoker, does not drink alcohol.     Patient Active Problem List   Diagnosis    2-vessel coronary artery disease    Amaurosis fugax    Angina, class IV (CMS-HCC)    Aortic valve regurgitation, nonrheumatic    Aortic valve stenosis    Asymptomatic stenosis of right carotid artery    Atrophy of vagina    Bilateral carotid bruits    Calcific supraspinatus tendinitis    Closed fracture of right hip (Multi)    Mixed hyperlipidemia    Paroxysmal atrial fibrillation (Multi)    Peripheral vascular disease, unspecified (CMS-HCC)    S/P AVR    S/P CABG (coronary artery bypass graft)    Thyroid nodule    Vitamin D deficiency    Recurrent UTI    Chronic kidney disease, stage 3a (Multi)    Rotator cuff arthropathy of right shoulder    Memory loss    Hypothyroidism due to acquired atrophy of thyroid    Essential hypertension    Hyperglycemia    Debility    Ataxia    Anemia, normocytic normochromic    Cognitive decline    Delirium    Sundowning    Hypomagnesemia    Caregiver stress    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)    Ataxic gait    Age-related osteoporosis without current pathological fracture    Cold intolerance    Interstitial myositis of multiple sites    Iron deficiency anemia    Trigger middle finger of left hand    BMI 31.0-31.9,adult    Bilateral edema of lower extremity    Pharyngeal dysphagia     Past Medical History:   Diagnosis Date    Dysuria     Dysuria    Nonrheumatic aortic (valve) stenosis 07/13/2016    Aortic stenosis, severe    Personal history of other diseases of the circulatory system 06/26/2016    History of coronary atherosclerosis    Personal history of other diseases of urinary system     History of hematuria    Personal history of other endocrine, nutritional and metabolic disease 07/13/2016    History of hyperlipidemia     Personal history of other specified conditions     History of chest pain    Personal history of other specified conditions 07/13/2016    History of shortness of breath     Past Surgical History:   Procedure Laterality Date    AORTIC VALVE REPLACEMENT  08/25/2016    Aortic Valve Replacement    CORONARY ARTERY BYPASS GRAFT  08/25/2016    CABG    HYSTERECTOMY  10/24/2016    Hysterectomy    MR HEAD ANGIO WO IV CONTRAST  7/18/2022    MR HEAD ANGIO WO IV CONTRAST 7/18/2022 [email or web address] CLINICAL LEGACY    MR HEAD ANGIO WO IV CONTRAST  7/19/2022    MR HEAD ANGIO WO IV CONTRAST    MR NECK ANGIO WO IV CONTRAST  7/18/2022    MR NECK ANGIO WO IV CONTRAST 7/18/2022 [facility] [email or web address] CLINICAL LEGACY    OTHER SURGICAL HISTORY  07/13/2016    Endarterectomy Carotid Artery    OTHER SURGICAL HISTORY  11/04/2021    Percutaneous transluminal coronary angioplasty    OTHER SURGICAL HISTORY  11/04/2021    Carotid thromboendarterectomy    OTHER SURGICAL HISTORY  08/26/2022    Hip surgery    OTHER SURGICAL HISTORY  07/16/2020    Colpocleisis    OTHER SURGICAL HISTORY  10/24/2016    Carotid Artery Catheterization     Social History     Tobacco Use    Smoking status: Never    Smokeless tobacco: Never   Substance Use Topics    Alcohol use: Never     family history includes Coronary artery disease in an other family member; Heart attack in her mother; Hypertension in an other family member; Lung disease in an other family member; Stroke in her father; cerebrovascular disorder in an other family member; dyslipidemia in an other family member.    Current Outpatient Medications:     atorvastatin (Lipitor) 40 mg tablet, TAKE 1 TABLET BY MOUTH EVERY DAY, Disp: 90 tablet, Rfl: 3    cholecalciferol (Vitamin D-3) 50,000 unit capsule, PLEASE TAKE 1 TABLET BY MOUTH ONCE A WEEK, SUNDAYS, WITH LUNCH AND A FULL GLASS OF WATER. THANK YOU., Disp: 13 capsule, Rfl: 0    furosemide (Lasix) 20 mg tablet, Take 1 tablet daily by mouth as needed for leg swelling., Disp: 90 tablet,  "Rfl: 3    ibandronate (Boniva) 150 mg tablet, Take 1 tablet (150 mg) by mouth every 30 (thirty) days. Take in morning with full glass of water on an empty stomach. No food, drink, meds, or lying down for 60 minutes after., Disp: 2 tablet, Rfl: 0    levothyroxine (Synthroid, Levoxyl) 88 mcg tablet, PLEASE TAKE 1 TABLET BY MOUTH BEFORE BREAKFAST. THANK YOU., Disp: 90 tablet, Rfl: 1    losartan-hydrochlorothiazide (Hyzaar) 100-25 mg tablet, Take 1 tablet by mouth once daily., Disp: , Rfl:     magnesium oxide (Mag-Ox) 400 mg (241.3 mg magnesium) tablet, TAKE 1 TABLET BY MOUTH TWICE A DAY WITH BREAKFAST AND SUPPER, Disp: 180 tablet, Rfl: 1    metoprolol succinate XL (Toprol-XL) 50 mg 24 hr tablet, Take 1 tablet (50 mg) by mouth 2 times a day., Disp: 180 tablet, Rfl: 3    nitroglycerin (Nitrostat) 0.4 mg SL tablet, PLACE 1 TABLET UNDER THE TONGUE EVERY 5 MINUTES FOR UP TO 3 DOSES AS NEEDED FOR CHEST PAIN.CALL 911 IF PAIN PERSISTS., Disp: , Rfl:     sertraline (Zoloft) 25 mg tablet, TAKE 1 TABLET BY MOUTH EVERY DAY, Disp: 90 tablet, Rfl: 0    warfarin (Coumadin) 2.5 mg tablet, TAKE 1 TABLET DAILY OR AS DIRECTED BY EDGARDO BENITES, Disp: 90 tablet, Rfl: 1    doxycycline (Monodox) 100 mg capsule, Take one tablet one hour prior to dental work. (Patient not taking: Reported on 7/29/2024), Disp: 1 capsule, Rfl: 3  Allergies   Allergen Reactions    Penicillins Anaphylaxis    Ampicillin Hives and Unknown       Objective   Neurological Exam  Mental Status   Oriented only to person. Speech is normal.  Stated she was in a medical office, month was \"around August\" when it is July 29th; stated year 2023 .    Cranial Nerves  CN III, IV, VI: Extraocular movements intact bilaterally.  CN VII: Full and symmetric facial movement.  CN VIII: Hearing is normal.    Motor   Strength is 5/5 throughout all four extremities.    Gait  Casual gait is normal including stance, stride, and arm swing.    Physical Exam  Eyes:      Extraocular Movements: " Extraocular movements intact.   Neurological:      Motor: Motor strength is normal.  Psychiatric:         Speech: Speech normal.         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 Now requires assistance with iADLs and ADLs with urinary incontinence and bathing. Oriented to person, doctor's office, but could not state location or date. Was able to identify her family members. B12 was checked 2023 and was wnl. MRI brain done in 2022 had small vessel ischemic disease and no other structural abnormality. Could not tolerate donepezil in the past, will hold off on further memory medications. Given her moderate to advanced stage dementia, we discussed safety considerations, limited polypharmacy and eliminating any unnecessary medications such as her statins and magnesium (pill is large and twice daily). Will hold off on starting additional memory medications. Discussed GOC and she does have a signed DNR.     Follow-up in 6 months

## 2024-08-05 ENCOUNTER — LAB (OUTPATIENT)
Dept: LAB | Facility: LAB | Age: 89
End: 2024-08-05
Payer: COMMERCIAL

## 2024-08-05 DIAGNOSIS — E78.2 MIXED HYPERLIPIDEMIA: ICD-10-CM

## 2024-08-05 DIAGNOSIS — I48.0 PAROXYSMAL ATRIAL FIBRILLATION (MULTI): ICD-10-CM

## 2024-08-05 DIAGNOSIS — R26.0 ATAXIC GAIT: ICD-10-CM

## 2024-08-05 DIAGNOSIS — R60.0 BILATERAL EDEMA OF LOWER EXTREMITY: ICD-10-CM

## 2024-08-05 DIAGNOSIS — D50.9 IRON DEFICIENCY ANEMIA, UNSPECIFIED IRON DEFICIENCY ANEMIA TYPE: ICD-10-CM

## 2024-08-05 DIAGNOSIS — N18.31 CHRONIC KIDNEY DISEASE, STAGE 3A (MULTI): ICD-10-CM

## 2024-08-05 DIAGNOSIS — R40.0 DAYTIME SLEEPINESS: ICD-10-CM

## 2024-08-05 DIAGNOSIS — I10 ESSENTIAL HYPERTENSION: ICD-10-CM

## 2024-08-05 DIAGNOSIS — M81.0 AGE-RELATED OSTEOPOROSIS WITHOUT CURRENT PATHOLOGICAL FRACTURE: ICD-10-CM

## 2024-08-05 DIAGNOSIS — R13.13 PHARYNGEAL DYSPHAGIA: ICD-10-CM

## 2024-08-05 DIAGNOSIS — R53.81 DEBILITY: ICD-10-CM

## 2024-08-05 DIAGNOSIS — M60.19 INTERSTITIAL MYOSITIS OF MULTIPLE SITES: ICD-10-CM

## 2024-08-05 DIAGNOSIS — F02.80 DEMENTIA OF THE ALZHEIMER'S TYPE WITH LATE ONSET WITHOUT BEHAVIORAL DISTURBANCE (MULTI): ICD-10-CM

## 2024-08-05 DIAGNOSIS — E03.4 HYPOTHYROIDISM DUE TO ACQUIRED ATROPHY OF THYROID: ICD-10-CM

## 2024-08-05 DIAGNOSIS — G30.1 DEMENTIA OF THE ALZHEIMER'S TYPE WITH LATE ONSET WITHOUT BEHAVIORAL DISTURBANCE (MULTI): ICD-10-CM

## 2024-08-05 DIAGNOSIS — R73.9 HYPERGLYCEMIA: ICD-10-CM

## 2024-08-05 DIAGNOSIS — E55.9 VITAMIN D DEFICIENCY: ICD-10-CM

## 2024-08-05 LAB
25(OH)D3 SERPL-MCNC: >120 NG/ML (ref 30–100)
ALBUMIN SERPL BCP-MCNC: 4 G/DL (ref 3.4–5)
ALP SERPL-CCNC: 61 U/L (ref 33–136)
ALT SERPL W P-5'-P-CCNC: 16 U/L (ref 7–45)
ANION GAP SERPL CALC-SCNC: 13 MMOL/L (ref 10–20)
APPEARANCE UR: ABNORMAL
AST SERPL W P-5'-P-CCNC: 19 U/L (ref 9–39)
BACTERIA #/AREA URNS AUTO: ABNORMAL /HPF
BASOPHILS # BLD AUTO: 0.06 X10*3/UL (ref 0–0.1)
BASOPHILS NFR BLD AUTO: 0.7 %
BILIRUB SERPL-MCNC: 0.5 MG/DL (ref 0–1.2)
BILIRUB UR STRIP.AUTO-MCNC: NEGATIVE MG/DL
BUN SERPL-MCNC: 29 MG/DL (ref 6–23)
CALCIUM SERPL-MCNC: 9.6 MG/DL (ref 8.6–10.3)
CHLORIDE SERPL-SCNC: 99 MMOL/L (ref 98–107)
CHOLEST SERPL-MCNC: 128 MG/DL (ref 0–199)
CHOLESTEROL/HDL RATIO: 2.7
CK SERPL-CCNC: 53 U/L (ref 0–215)
CO2 SERPL-SCNC: 31 MMOL/L (ref 21–32)
COLOR UR: YELLOW
CREAT SERPL-MCNC: 0.94 MG/DL (ref 0.5–1.05)
CREAT UR-MCNC: 69.1 MG/DL (ref 20–320)
EGFRCR SERPLBLD CKD-EPI 2021: 59 ML/MIN/1.73M*2
EOSINOPHIL # BLD AUTO: 0.46 X10*3/UL (ref 0–0.4)
EOSINOPHIL NFR BLD AUTO: 5.2 %
ERYTHROCYTE [DISTWIDTH] IN BLOOD BY AUTOMATED COUNT: 15.2 % (ref 11.5–14.5)
EST. AVERAGE GLUCOSE BLD GHB EST-MCNC: 123 MG/DL
FERRITIN SERPL-MCNC: 66 NG/ML (ref 8–150)
FOLATE SERPL-MCNC: >22.3 NG/ML
GLUCOSE SERPL-MCNC: 97 MG/DL (ref 74–99)
GLUCOSE UR STRIP.AUTO-MCNC: NORMAL MG/DL
HBA1C MFR BLD: 5.9 %
HCT VFR BLD AUTO: 36.4 % (ref 36–46)
HDLC SERPL-MCNC: 47.2 MG/DL
HGB BLD-MCNC: 11.9 G/DL (ref 12–16)
IMM GRANULOCYTES # BLD AUTO: 0.04 X10*3/UL (ref 0–0.5)
IMM GRANULOCYTES NFR BLD AUTO: 0.5 % (ref 0–0.9)
INR PPP: 3.8 (ref 0.9–1.1)
IRON SATN MFR SERPL: 13 % (ref 25–45)
IRON SERPL-MCNC: 48 UG/DL (ref 35–150)
KETONES UR STRIP.AUTO-MCNC: NEGATIVE MG/DL
LDLC SERPL CALC-MCNC: 66 MG/DL
LEUKOCYTE ESTERASE UR QL STRIP.AUTO: ABNORMAL
LYMPHOCYTES # BLD AUTO: 1.62 X10*3/UL (ref 0.8–3)
LYMPHOCYTES NFR BLD AUTO: 18.3 %
MAGNESIUM SERPL-MCNC: 1.63 MG/DL (ref 1.6–2.4)
MCH RBC QN AUTO: 28.7 PG (ref 26–34)
MCHC RBC AUTO-ENTMCNC: 32.7 G/DL (ref 32–36)
MCV RBC AUTO: 88 FL (ref 80–100)
MICROALBUMIN UR-MCNC: 43.1 MG/L
MICROALBUMIN/CREAT UR: 62.4 UG/MG CREAT
MONOCYTES # BLD AUTO: 0.83 X10*3/UL (ref 0.05–0.8)
MONOCYTES NFR BLD AUTO: 9.4 %
NEUTROPHILS # BLD AUTO: 5.86 X10*3/UL (ref 1.6–5.5)
NEUTROPHILS NFR BLD AUTO: 65.9 %
NITRITE UR QL STRIP.AUTO: NEGATIVE
NON HDL CHOLESTEROL: 81 MG/DL (ref 0–149)
NRBC BLD-RTO: 0 /100 WBCS (ref 0–0)
PH UR STRIP.AUTO: 6.5 [PH]
PLATELET # BLD AUTO: 238 X10*3/UL (ref 150–450)
POTASSIUM SERPL-SCNC: 3.4 MMOL/L (ref 3.5–5.3)
PROT SERPL-MCNC: 7.1 G/DL (ref 6.4–8.2)
PROT UR STRIP.AUTO-MCNC: NEGATIVE MG/DL
PROTHROMBIN TIME: 43.3 SECONDS (ref 9.8–12.8)
RBC # BLD AUTO: 4.15 X10*6/UL (ref 4–5.2)
RBC # UR STRIP.AUTO: NEGATIVE /UL
RBC #/AREA URNS AUTO: ABNORMAL /HPF
SODIUM SERPL-SCNC: 140 MMOL/L (ref 136–145)
SP GR UR STRIP.AUTO: 1.01
SQUAMOUS #/AREA URNS AUTO: ABNORMAL /HPF
TIBC SERPL-MCNC: 369 UG/DL (ref 240–445)
TRIGL SERPL-MCNC: 72 MG/DL (ref 0–149)
TSH SERPL-ACNC: 2.75 MIU/L (ref 0.44–3.98)
UIBC SERPL-MCNC: 321 UG/DL (ref 110–370)
URATE SERPL-MCNC: 6.9 MG/DL (ref 2.3–6.7)
UROBILINOGEN UR STRIP.AUTO-MCNC: NORMAL MG/DL
VIT B12 SERPL-MCNC: 557 PG/ML (ref 211–911)
VLDL: 14 MG/DL (ref 0–40)
WBC # BLD AUTO: 8.9 X10*3/UL (ref 4.4–11.3)
WBC #/AREA URNS AUTO: >50 /HPF
WBC CLUMPS #/AREA URNS AUTO: ABNORMAL /HPF
YEAST BUDDING #/AREA UR COMP ASSIST: PRESENT /HPF

## 2024-08-05 PROCEDURE — 82043 UR ALBUMIN QUANTITATIVE: CPT

## 2024-08-05 PROCEDURE — 82746 ASSAY OF FOLIC ACID SERUM: CPT

## 2024-08-05 PROCEDURE — 82306 VITAMIN D 25 HYDROXY: CPT

## 2024-08-05 PROCEDURE — 36415 COLL VENOUS BLD VENIPUNCTURE: CPT

## 2024-08-05 PROCEDURE — 83036 HEMOGLOBIN GLYCOSYLATED A1C: CPT

## 2024-08-05 PROCEDURE — 82607 VITAMIN B-12: CPT

## 2024-08-05 PROCEDURE — 85025 COMPLETE CBC W/AUTO DIFF WBC: CPT

## 2024-08-05 PROCEDURE — 82570 ASSAY OF URINE CREATININE: CPT

## 2024-08-05 PROCEDURE — 80053 COMPREHEN METABOLIC PANEL: CPT

## 2024-08-05 PROCEDURE — 82728 ASSAY OF FERRITIN: CPT

## 2024-08-05 PROCEDURE — 81001 URINALYSIS AUTO W/SCOPE: CPT

## 2024-08-05 PROCEDURE — 85610 PROTHROMBIN TIME: CPT

## 2024-08-05 PROCEDURE — 83540 ASSAY OF IRON: CPT

## 2024-08-05 PROCEDURE — 84550 ASSAY OF BLOOD/URIC ACID: CPT

## 2024-08-05 PROCEDURE — 84443 ASSAY THYROID STIM HORMONE: CPT

## 2024-08-05 PROCEDURE — 82550 ASSAY OF CK (CPK): CPT

## 2024-08-05 PROCEDURE — 83550 IRON BINDING TEST: CPT

## 2024-08-05 PROCEDURE — 83735 ASSAY OF MAGNESIUM: CPT

## 2024-08-05 PROCEDURE — 87086 URINE CULTURE/COLONY COUNT: CPT

## 2024-08-05 PROCEDURE — 80061 LIPID PANEL: CPT

## 2024-08-06 LAB — HOLD SPECIMEN: NORMAL

## 2024-08-07 LAB — BACTERIA UR CULT: ABNORMAL

## 2024-08-14 DIAGNOSIS — I10 ESSENTIAL HYPERTENSION: ICD-10-CM

## 2024-08-14 RX ORDER — LOSARTAN POTASSIUM AND HYDROCHLOROTHIAZIDE 25; 100 MG/1; MG/1
1 TABLET ORAL DAILY
Qty: 90 TABLET | Refills: 3 | Status: SHIPPED | OUTPATIENT
Start: 2024-08-14

## 2024-08-15 DIAGNOSIS — I10 ESSENTIAL HYPERTENSION: Primary | ICD-10-CM

## 2024-08-15 RX ORDER — AMLODIPINE BESYLATE 10 MG/1
10 TABLET ORAL DAILY
Qty: 90 TABLET | Refills: 3 | Status: SHIPPED | OUTPATIENT
Start: 2024-08-15

## 2024-08-15 NOTE — TELEPHONE ENCOUNTER
Received request for prescription refills for patient.   Patient follows with Dr. Parsons     Request is for Amlodipine 10mg QD  Is patient currently on medication yes    Last OV 5/17/24  Next OV 2/21/25    Pended for signing and sent to provider

## 2024-09-13 DIAGNOSIS — M81.0 AGE-RELATED OSTEOPOROSIS WITHOUT CURRENT PATHOLOGICAL FRACTURE: ICD-10-CM

## 2024-09-13 DIAGNOSIS — E55.9 VITAMIN D DEFICIENCY: ICD-10-CM

## 2024-09-13 DIAGNOSIS — R26.0 ATAXIC GAIT: ICD-10-CM

## 2024-09-15 RX ORDER — IBANDRONATE SODIUM 150 MG/1
TABLET, FILM COATED ORAL
Qty: 3 TABLET | Refills: 0 | Status: ON HOLD | OUTPATIENT
Start: 2024-09-15

## 2024-09-16 ENCOUNTER — APPOINTMENT (OUTPATIENT)
Dept: RADIOLOGY | Facility: HOSPITAL | Age: 89
DRG: 871 | End: 2024-09-16
Payer: COMMERCIAL

## 2024-09-16 ENCOUNTER — HOSPITAL ENCOUNTER (INPATIENT)
Facility: HOSPITAL | Age: 89
Discharge: NURSING, REHAB OR LONG-TERM CARE | DRG: 871 | End: 2024-09-16
Attending: STUDENT IN AN ORGANIZED HEALTH CARE EDUCATION/TRAINING PROGRAM | Admitting: INTERNAL MEDICINE
Payer: COMMERCIAL

## 2024-09-16 ENCOUNTER — APPOINTMENT (OUTPATIENT)
Dept: CARDIOLOGY | Facility: HOSPITAL | Age: 89
DRG: 871 | End: 2024-09-16
Payer: COMMERCIAL

## 2024-09-16 DIAGNOSIS — A41.9 SEPSIS, DUE TO UNSPECIFIED ORGANISM, UNSPECIFIED WHETHER ACUTE ORGAN DYSFUNCTION PRESENT (MULTI): ICD-10-CM

## 2024-09-16 DIAGNOSIS — R41.0 DELIRIUM: ICD-10-CM

## 2024-09-16 DIAGNOSIS — J18.9 PNEUMONIA DUE TO INFECTIOUS ORGANISM, UNSPECIFIED LATERALITY, UNSPECIFIED PART OF LUNG: ICD-10-CM

## 2024-09-16 DIAGNOSIS — I48.91 ATRIAL FIBRILLATION WITH RVR (MULTI): Primary | ICD-10-CM

## 2024-09-16 DIAGNOSIS — R79.1 SUPRATHERAPEUTIC INR: ICD-10-CM

## 2024-09-16 DIAGNOSIS — N17.9 AKI (ACUTE KIDNEY INJURY) (CMS-HCC): ICD-10-CM

## 2024-09-16 DIAGNOSIS — R79.89 ELEVATED BRAIN NATRIURETIC PEPTIDE (BNP) LEVEL: ICD-10-CM

## 2024-09-16 DIAGNOSIS — E87.6 HYPOKALEMIA: ICD-10-CM

## 2024-09-16 PROBLEM — D72.829 LEUKOCYTOSIS: Status: ACTIVE | Noted: 2024-09-16

## 2024-09-16 PROBLEM — J96.01 ACUTE HYPOXEMIC RESPIRATORY FAILURE (MULTI): Status: ACTIVE | Noted: 2024-09-16

## 2024-09-16 LAB
ALBUMIN SERPL BCP-MCNC: 4.1 G/DL (ref 3.4–5)
ALP SERPL-CCNC: 78 U/L (ref 33–136)
ALT SERPL W P-5'-P-CCNC: 20 U/L (ref 7–45)
ANION GAP SERPL CALC-SCNC: 16 MMOL/L (ref 10–20)
APPEARANCE UR: CLEAR
APTT PPP: 51 SECONDS (ref 27–38)
AST SERPL W P-5'-P-CCNC: 22 U/L (ref 9–39)
BASOPHILS # BLD MANUAL: 0 X10*3/UL (ref 0–0.1)
BASOPHILS NFR BLD MANUAL: 0 %
BILIRUB SERPL-MCNC: 1.4 MG/DL (ref 0–1.2)
BILIRUB UR STRIP.AUTO-MCNC: NEGATIVE MG/DL
BNP SERPL-MCNC: 1621 PG/ML (ref 0–99)
BUN SERPL-MCNC: 29 MG/DL (ref 6–23)
CALCIUM SERPL-MCNC: 9.5 MG/DL (ref 8.6–10.3)
CHLORIDE SERPL-SCNC: 95 MMOL/L (ref 98–107)
CO2 SERPL-SCNC: 30 MMOL/L (ref 21–32)
COLOR UR: NORMAL
CREAT SERPL-MCNC: 1.23 MG/DL (ref 0.5–1.05)
EGFRCR SERPLBLD CKD-EPI 2021: 42 ML/MIN/1.73M*2
EOSINOPHIL # BLD MANUAL: 0 X10*3/UL (ref 0–0.4)
EOSINOPHIL NFR BLD MANUAL: 0 %
ERYTHROCYTE [DISTWIDTH] IN BLOOD BY AUTOMATED COUNT: 15.6 % (ref 11.5–14.5)
GLUCOSE SERPL-MCNC: 170 MG/DL (ref 74–99)
GLUCOSE UR STRIP.AUTO-MCNC: NORMAL MG/DL
HCT VFR BLD AUTO: 35 % (ref 36–46)
HGB BLD-MCNC: 11.5 G/DL (ref 12–16)
IMM GRANULOCYTES # BLD AUTO: 0.51 X10*3/UL (ref 0–0.5)
IMM GRANULOCYTES NFR BLD AUTO: 2 % (ref 0–0.9)
INR PPP: 4.9 (ref 0.9–1.1)
KETONES UR STRIP.AUTO-MCNC: NEGATIVE MG/DL
LACTATE SERPL-SCNC: 2.2 MMOL/L (ref 0.4–2)
LACTATE SERPL-SCNC: 2.5 MMOL/L (ref 0.4–2)
LEUKOCYTE ESTERASE UR QL STRIP.AUTO: NEGATIVE
LYMPHOCYTES # BLD MANUAL: 1.03 X10*3/UL (ref 0.8–3)
LYMPHOCYTES NFR BLD MANUAL: 4 %
MCH RBC QN AUTO: 28.5 PG (ref 26–34)
MCHC RBC AUTO-ENTMCNC: 32.9 G/DL (ref 32–36)
MCV RBC AUTO: 87 FL (ref 80–100)
MONOCYTES # BLD MANUAL: 1.03 X10*3/UL (ref 0.05–0.8)
MONOCYTES NFR BLD MANUAL: 4 %
MRSA DNA SPEC QL NAA+PROBE: NOT DETECTED
NEUTROPHILS # BLD MANUAL: 23.73 X10*3/UL (ref 1.6–5.5)
NEUTS BAND # BLD MANUAL: 4.9 X10*3/UL (ref 0–0.5)
NEUTS BAND NFR BLD MANUAL: 19 %
NEUTS SEG # BLD MANUAL: 18.83 X10*3/UL (ref 1.6–5)
NEUTS SEG NFR BLD MANUAL: 73 %
NEUTS VAC BLD QL SMEAR: PRESENT
NITRITE UR QL STRIP.AUTO: NEGATIVE
NRBC BLD-RTO: 0 /100 WBCS (ref 0–0)
OVALOCYTES BLD QL SMEAR: ABNORMAL
PH UR STRIP.AUTO: 6.5 [PH]
PLATELET # BLD AUTO: 239 X10*3/UL (ref 150–450)
POTASSIUM SERPL-SCNC: 3 MMOL/L (ref 3.5–5.3)
PROT SERPL-MCNC: 7.6 G/DL (ref 6.4–8.2)
PROT UR STRIP.AUTO-MCNC: NEGATIVE MG/DL
PROTHROMBIN TIME: 56 SECONDS (ref 9.8–12.8)
RBC # BLD AUTO: 4.04 X10*6/UL (ref 4–5.2)
RBC # UR STRIP.AUTO: NEGATIVE /UL
RBC MORPH BLD: ABNORMAL
SODIUM SERPL-SCNC: 138 MMOL/L (ref 136–145)
SP GR UR STRIP.AUTO: 1.01
TOTAL CELLS COUNTED BLD: 100
TSH SERPL-ACNC: 0.62 MIU/L (ref 0.44–3.98)
UROBILINOGEN UR STRIP.AUTO-MCNC: NORMAL MG/DL
WBC # BLD AUTO: 25.8 X10*3/UL (ref 4.4–11.3)

## 2024-09-16 PROCEDURE — 83605 ASSAY OF LACTIC ACID: CPT | Performed by: STUDENT IN AN ORGANIZED HEALTH CARE EDUCATION/TRAINING PROGRAM

## 2024-09-16 PROCEDURE — 84443 ASSAY THYROID STIM HORMONE: CPT | Performed by: PHYSICIAN ASSISTANT

## 2024-09-16 PROCEDURE — 36415 COLL VENOUS BLD VENIPUNCTURE: CPT | Performed by: PHYSICIAN ASSISTANT

## 2024-09-16 PROCEDURE — 99285 EMERGENCY DEPT VISIT HI MDM: CPT

## 2024-09-16 PROCEDURE — 99285 EMERGENCY DEPT VISIT HI MDM: CPT | Performed by: PHYSICIAN ASSISTANT

## 2024-09-16 PROCEDURE — 71045 X-RAY EXAM CHEST 1 VIEW: CPT

## 2024-09-16 PROCEDURE — 2500000005 HC RX 250 GENERAL PHARMACY W/O HCPCS: Performed by: STUDENT IN AN ORGANIZED HEALTH CARE EDUCATION/TRAINING PROGRAM

## 2024-09-16 PROCEDURE — 2500000004 HC RX 250 GENERAL PHARMACY W/ HCPCS (ALT 636 FOR OP/ED): Mod: JZ | Performed by: PHYSICIAN ASSISTANT

## 2024-09-16 PROCEDURE — 80053 COMPREHEN METABOLIC PANEL: CPT | Performed by: STUDENT IN AN ORGANIZED HEALTH CARE EDUCATION/TRAINING PROGRAM

## 2024-09-16 PROCEDURE — 96375 TX/PRO/DX INJ NEW DRUG ADDON: CPT

## 2024-09-16 PROCEDURE — 36415 COLL VENOUS BLD VENIPUNCTURE: CPT | Performed by: STUDENT IN AN ORGANIZED HEALTH CARE EDUCATION/TRAINING PROGRAM

## 2024-09-16 PROCEDURE — 87040 BLOOD CULTURE FOR BACTERIA: CPT | Mod: STJLAB | Performed by: PHYSICIAN ASSISTANT

## 2024-09-16 PROCEDURE — 83880 ASSAY OF NATRIURETIC PEPTIDE: CPT | Performed by: STUDENT IN AN ORGANIZED HEALTH CARE EDUCATION/TRAINING PROGRAM

## 2024-09-16 PROCEDURE — 85610 PROTHROMBIN TIME: CPT | Performed by: PHYSICIAN ASSISTANT

## 2024-09-16 PROCEDURE — 87641 MR-STAPH DNA AMP PROBE: CPT | Performed by: PHYSICIAN ASSISTANT

## 2024-09-16 PROCEDURE — 83605 ASSAY OF LACTIC ACID: CPT | Performed by: PHYSICIAN ASSISTANT

## 2024-09-16 PROCEDURE — 96366 THER/PROPH/DIAG IV INF ADDON: CPT

## 2024-09-16 PROCEDURE — 99223 1ST HOSP IP/OBS HIGH 75: CPT | Performed by: INTERNAL MEDICINE

## 2024-09-16 PROCEDURE — 93010 ELECTROCARDIOGRAM REPORT: CPT | Performed by: PHYSICIAN ASSISTANT

## 2024-09-16 PROCEDURE — 96365 THER/PROPH/DIAG IV INF INIT: CPT

## 2024-09-16 PROCEDURE — 93005 ELECTROCARDIOGRAM TRACING: CPT

## 2024-09-16 PROCEDURE — 1100000001 HC PRIVATE ROOM DAILY

## 2024-09-16 PROCEDURE — 81003 URINALYSIS AUTO W/O SCOPE: CPT | Performed by: STUDENT IN AN ORGANIZED HEALTH CARE EDUCATION/TRAINING PROGRAM

## 2024-09-16 PROCEDURE — 96368 THER/DIAG CONCURRENT INF: CPT

## 2024-09-16 PROCEDURE — 85007 BL SMEAR W/DIFF WBC COUNT: CPT | Performed by: STUDENT IN AN ORGANIZED HEALTH CARE EDUCATION/TRAINING PROGRAM

## 2024-09-16 PROCEDURE — 71045 X-RAY EXAM CHEST 1 VIEW: CPT | Performed by: STUDENT IN AN ORGANIZED HEALTH CARE EDUCATION/TRAINING PROGRAM

## 2024-09-16 PROCEDURE — 85027 COMPLETE CBC AUTOMATED: CPT | Performed by: STUDENT IN AN ORGANIZED HEALTH CARE EDUCATION/TRAINING PROGRAM

## 2024-09-16 RX ORDER — METRONIDAZOLE 500 MG/100ML
500 INJECTION, SOLUTION INTRAVENOUS ONCE
Status: COMPLETED | OUTPATIENT
Start: 2024-09-16 | End: 2024-09-16

## 2024-09-16 RX ORDER — FUROSEMIDE 10 MG/ML
40 INJECTION INTRAMUSCULAR; INTRAVENOUS ONCE
Status: COMPLETED | OUTPATIENT
Start: 2024-09-16 | End: 2024-09-16

## 2024-09-16 RX ORDER — CEFEPIME 1 G/50ML
2 INJECTION, SOLUTION INTRAVENOUS ONCE
Status: COMPLETED | OUTPATIENT
Start: 2024-09-16 | End: 2024-09-16

## 2024-09-16 RX ORDER — VANCOMYCIN 2 GRAM/500 ML IN 0.9 % SODIUM CHLORIDE INTRAVENOUS
2 ONCE
Status: COMPLETED | OUTPATIENT
Start: 2024-09-16 | End: 2024-09-17

## 2024-09-16 RX ORDER — POTASSIUM CHLORIDE 14.9 MG/ML
20 INJECTION INTRAVENOUS
Status: DISPENSED | OUTPATIENT
Start: 2024-09-16 | End: 2024-09-17

## 2024-09-16 RX ORDER — METOPROLOL TARTRATE 1 MG/ML
5 INJECTION, SOLUTION INTRAVENOUS ONCE
Status: DISCONTINUED | OUTPATIENT
Start: 2024-09-16 | End: 2024-09-16

## 2024-09-16 RX ORDER — DIGOXIN 0.25 MG/ML
125 INJECTION INTRAMUSCULAR; INTRAVENOUS DAILY
Status: DISCONTINUED | OUTPATIENT
Start: 2024-09-16 | End: 2024-09-16

## 2024-09-16 RX ORDER — METOPROLOL TARTRATE 1 MG/ML
5 INJECTION, SOLUTION INTRAVENOUS EVERY 6 HOURS PRN
Status: DISCONTINUED | OUTPATIENT
Start: 2024-09-16 | End: 2024-09-17

## 2024-09-16 RX ORDER — METOPROLOL TARTRATE 1 MG/ML
5 INJECTION, SOLUTION INTRAVENOUS
Status: COMPLETED | OUTPATIENT
Start: 2024-09-16 | End: 2024-09-16

## 2024-09-16 ASSESSMENT — PAIN DESCRIPTION - LOCATION: LOCATION: GENERALIZED

## 2024-09-16 ASSESSMENT — PAIN DESCRIPTION - PAIN TYPE: TYPE: ACUTE PAIN

## 2024-09-16 ASSESSMENT — COLUMBIA-SUICIDE SEVERITY RATING SCALE - C-SSRS
6. HAVE YOU EVER DONE ANYTHING, STARTED TO DO ANYTHING, OR PREPARED TO DO ANYTHING TO END YOUR LIFE?: NO
1. IN THE PAST MONTH, HAVE YOU WISHED YOU WERE DEAD OR WISHED YOU COULD GO TO SLEEP AND NOT WAKE UP?: NO
2. HAVE YOU ACTUALLY HAD ANY THOUGHTS OF KILLING YOURSELF?: NO

## 2024-09-16 ASSESSMENT — PAIN SCALES - GENERAL: PAINLEVEL_OUTOF10: 10 - WORST POSSIBLE PAIN

## 2024-09-16 ASSESSMENT — PAIN - FUNCTIONAL ASSESSMENT: PAIN_FUNCTIONAL_ASSESSMENT: 0-10

## 2024-09-17 PROBLEM — D68.9 COAGULOPATHY (MULTI): Status: ACTIVE | Noted: 2024-09-17

## 2024-09-17 PROBLEM — D72.825 BANDEMIA: Status: ACTIVE | Noted: 2024-09-17

## 2024-09-17 PROBLEM — R65.20 SEPSIS WITH ACUTE ORGAN DYSFUNCTION (MULTI): Status: ACTIVE | Noted: 2024-09-17

## 2024-09-17 PROBLEM — A41.9 SEPSIS WITH ACUTE ORGAN DYSFUNCTION (MULTI): Status: ACTIVE | Noted: 2024-09-17

## 2024-09-17 LAB
ALBUMIN SERPL BCP-MCNC: 3.2 G/DL (ref 3.4–5)
ALP SERPL-CCNC: 67 U/L (ref 33–136)
ALT SERPL W P-5'-P-CCNC: 17 U/L (ref 7–45)
ANION GAP SERPL CALC-SCNC: 13 MMOL/L (ref 10–20)
AST SERPL W P-5'-P-CCNC: 25 U/L (ref 9–39)
ATRIAL RATE: 115 BPM
BASOPHILS # BLD MANUAL: 0 X10*3/UL (ref 0–0.1)
BASOPHILS NFR BLD MANUAL: 0 %
BILIRUB SERPL-MCNC: 1.1 MG/DL (ref 0–1.2)
BUN SERPL-MCNC: 32 MG/DL (ref 6–23)
CALCIUM SERPL-MCNC: 8.3 MG/DL (ref 8.6–10.3)
CHLORIDE SERPL-SCNC: 98 MMOL/L (ref 98–107)
CO2 SERPL-SCNC: 30 MMOL/L (ref 21–32)
CREAT SERPL-MCNC: 1.11 MG/DL (ref 0.5–1.05)
DOHLE BOD BLD QL SMEAR: PRESENT
EGFRCR SERPLBLD CKD-EPI 2021: 48 ML/MIN/1.73M*2
EOSINOPHIL # BLD MANUAL: 0 X10*3/UL (ref 0–0.4)
EOSINOPHIL NFR BLD MANUAL: 0 %
ERYTHROCYTE [DISTWIDTH] IN BLOOD BY AUTOMATED COUNT: 15.6 % (ref 11.5–14.5)
GLUCOSE SERPL-MCNC: 105 MG/DL (ref 74–99)
HCT VFR BLD AUTO: 30.3 % (ref 36–46)
HGB BLD-MCNC: 10.1 G/DL (ref 12–16)
HOLD SPECIMEN: NORMAL
HOLD SPECIMEN: NORMAL
HYPOCHROMIA BLD QL SMEAR: ABNORMAL
IMM GRANULOCYTES # BLD AUTO: 0.36 X10*3/UL (ref 0–0.5)
IMM GRANULOCYTES NFR BLD AUTO: 1.5 % (ref 0–0.9)
INR PPP: 8.3 (ref 0.9–1.1)
LACTATE SERPL-SCNC: 1.6 MMOL/L (ref 0.4–2)
LYMPHOCYTES # BLD MANUAL: 2.35 X10*3/UL (ref 0.8–3)
LYMPHOCYTES NFR BLD MANUAL: 10 %
MAGNESIUM SERPL-MCNC: 1.5 MG/DL (ref 1.6–2.4)
MCH RBC QN AUTO: 28.8 PG (ref 26–34)
MCHC RBC AUTO-ENTMCNC: 33.3 G/DL (ref 32–36)
MCV RBC AUTO: 86 FL (ref 80–100)
MONOCYTES # BLD MANUAL: 0.24 X10*3/UL (ref 0.05–0.8)
MONOCYTES NFR BLD MANUAL: 1 %
NEUTROPHILS # BLD MANUAL: 20.92 X10*3/UL (ref 1.6–5.5)
NEUTS BAND # BLD MANUAL: 1.18 X10*3/UL (ref 0–0.5)
NEUTS BAND NFR BLD MANUAL: 5 %
NEUTS SEG # BLD MANUAL: 19.74 X10*3/UL (ref 1.6–5)
NEUTS SEG NFR BLD MANUAL: 84 %
NRBC BLD-RTO: 0 /100 WBCS (ref 0–0)
OVALOCYTES BLD QL SMEAR: ABNORMAL
PHOSPHATE SERPL-MCNC: 2.3 MG/DL (ref 2.5–4.9)
PLATELET # BLD AUTO: 191 X10*3/UL (ref 150–450)
POTASSIUM SERPL-SCNC: 2.7 MMOL/L (ref 3.5–5.3)
PROT SERPL-MCNC: 6.2 G/DL (ref 6.4–8.2)
PROTHROMBIN TIME: 96 SECONDS (ref 9.8–12.8)
Q ONSET: 222 MS
QRS COUNT: 23 BEATS
QRS DURATION: 106 MS
QT INTERVAL: 328 MS
QTC CALCULATION(BAZETT): 496 MS
QTC FREDERICIA: 432 MS
R AXIS: 122 DEGREES
RBC # BLD AUTO: 3.51 X10*6/UL (ref 4–5.2)
RBC MORPH BLD: ABNORMAL
SODIUM SERPL-SCNC: 138 MMOL/L (ref 136–145)
T AXIS: -43 DEGREES
T OFFSET: 386 MS
TOTAL CELLS COUNTED BLD: 100
TOXIC GRANULES BLD QL SMEAR: PRESENT
VENTRICULAR RATE: 138 BPM
WBC # BLD AUTO: 23.5 X10*3/UL (ref 4.4–11.3)

## 2024-09-17 PROCEDURE — 2500000005 HC RX 250 GENERAL PHARMACY W/O HCPCS: Performed by: STUDENT IN AN ORGANIZED HEALTH CARE EDUCATION/TRAINING PROGRAM

## 2024-09-17 PROCEDURE — 85007 BL SMEAR W/DIFF WBC COUNT: CPT | Performed by: INTERNAL MEDICINE

## 2024-09-17 PROCEDURE — 2500000005 HC RX 250 GENERAL PHARMACY W/O HCPCS: Performed by: INTERNAL MEDICINE

## 2024-09-17 PROCEDURE — 1100000001 HC PRIVATE ROOM DAILY

## 2024-09-17 PROCEDURE — 83735 ASSAY OF MAGNESIUM: CPT | Performed by: INTERNAL MEDICINE

## 2024-09-17 PROCEDURE — 85027 COMPLETE CBC AUTOMATED: CPT | Performed by: INTERNAL MEDICINE

## 2024-09-17 PROCEDURE — 2500000004 HC RX 250 GENERAL PHARMACY W/ HCPCS (ALT 636 FOR OP/ED): Performed by: INTERNAL MEDICINE

## 2024-09-17 PROCEDURE — 99232 SBSQ HOSP IP/OBS MODERATE 35: CPT | Performed by: INTERNAL MEDICINE

## 2024-09-17 PROCEDURE — 2580000001 HC RX 258 IV SOLUTIONS: Performed by: INTERNAL MEDICINE

## 2024-09-17 PROCEDURE — 84075 ASSAY ALKALINE PHOSPHATASE: CPT | Performed by: INTERNAL MEDICINE

## 2024-09-17 PROCEDURE — 2500000001 HC RX 250 WO HCPCS SELF ADMINISTERED DRUGS (ALT 637 FOR MEDICARE OP): Performed by: INTERNAL MEDICINE

## 2024-09-17 PROCEDURE — 92526 ORAL FUNCTION THERAPY: CPT | Mod: GN | Performed by: STUDENT IN AN ORGANIZED HEALTH CARE EDUCATION/TRAINING PROGRAM

## 2024-09-17 PROCEDURE — 36415 COLL VENOUS BLD VENIPUNCTURE: CPT | Performed by: PHYSICIAN ASSISTANT

## 2024-09-17 PROCEDURE — 36415 COLL VENOUS BLD VENIPUNCTURE: CPT | Performed by: INTERNAL MEDICINE

## 2024-09-17 PROCEDURE — 84100 ASSAY OF PHOSPHORUS: CPT | Performed by: INTERNAL MEDICINE

## 2024-09-17 PROCEDURE — 92610 EVALUATE SWALLOWING FUNCTION: CPT | Mod: GN | Performed by: STUDENT IN AN ORGANIZED HEALTH CARE EDUCATION/TRAINING PROGRAM

## 2024-09-17 PROCEDURE — 85610 PROTHROMBIN TIME: CPT | Performed by: INTERNAL MEDICINE

## 2024-09-17 PROCEDURE — 83605 ASSAY OF LACTIC ACID: CPT | Performed by: PHYSICIAN ASSISTANT

## 2024-09-17 RX ORDER — SODIUM CHLORIDE AND POTASSIUM CHLORIDE 300; 900 MG/100ML; MG/100ML
50 INJECTION, SOLUTION INTRAVENOUS CONTINUOUS
Status: DISCONTINUED | OUTPATIENT
Start: 2024-09-17 | End: 2024-09-17

## 2024-09-17 RX ORDER — ONDANSETRON HYDROCHLORIDE 2 MG/ML
4 INJECTION, SOLUTION INTRAVENOUS EVERY 8 HOURS PRN
Status: DISCONTINUED | OUTPATIENT
Start: 2024-09-17 | End: 2024-09-24 | Stop reason: HOSPADM

## 2024-09-17 RX ORDER — ACETAMINOPHEN 160 MG/5ML
650 SOLUTION ORAL EVERY 4 HOURS PRN
Status: DISCONTINUED | OUTPATIENT
Start: 2024-09-17 | End: 2024-09-17

## 2024-09-17 RX ORDER — LEVOFLOXACIN 5 MG/ML
750 INJECTION, SOLUTION INTRAVENOUS
Status: DISCONTINUED | OUTPATIENT
Start: 2024-09-17 | End: 2024-09-19

## 2024-09-17 RX ORDER — MAGNESIUM SULFATE HEPTAHYDRATE 40 MG/ML
4 INJECTION, SOLUTION INTRAVENOUS ONCE
Status: COMPLETED | OUTPATIENT
Start: 2024-09-17 | End: 2024-09-17

## 2024-09-17 RX ORDER — SODIUM CHLORIDE AND POTASSIUM CHLORIDE 300; 900 MG/100ML; MG/100ML
75 INJECTION, SOLUTION INTRAVENOUS CONTINUOUS
Status: DISPENSED | OUTPATIENT
Start: 2024-09-17 | End: 2024-09-18

## 2024-09-17 RX ORDER — SERTRALINE HYDROCHLORIDE 25 MG/1
25 TABLET, FILM COATED ORAL DAILY
Status: DISCONTINUED | OUTPATIENT
Start: 2024-09-17 | End: 2024-09-24 | Stop reason: HOSPADM

## 2024-09-17 RX ORDER — ACETAMINOPHEN 325 MG/1
650 TABLET ORAL EVERY 4 HOURS PRN
Status: DISCONTINUED | OUTPATIENT
Start: 2024-09-17 | End: 2024-09-17

## 2024-09-17 RX ORDER — ACETAMINOPHEN 325 MG/1
975 TABLET ORAL EVERY 8 HOURS PRN
Status: DISCONTINUED | OUTPATIENT
Start: 2024-09-17 | End: 2024-09-24 | Stop reason: HOSPADM

## 2024-09-17 RX ORDER — ACETAMINOPHEN 650 MG/1
650 SUPPOSITORY RECTAL EVERY 4 HOURS PRN
Status: DISCONTINUED | OUTPATIENT
Start: 2024-09-17 | End: 2024-09-17

## 2024-09-17 RX ORDER — TALC
3 POWDER (GRAM) TOPICAL NIGHTLY PRN
Status: DISCONTINUED | OUTPATIENT
Start: 2024-09-17 | End: 2024-09-24 | Stop reason: HOSPADM

## 2024-09-17 RX ORDER — ONDANSETRON 4 MG/1
4 TABLET, ORALLY DISINTEGRATING ORAL EVERY 8 HOURS PRN
Status: DISCONTINUED | OUTPATIENT
Start: 2024-09-17 | End: 2024-09-17

## 2024-09-17 RX ORDER — METOPROLOL SUCCINATE 50 MG/1
50 TABLET, EXTENDED RELEASE ORAL 2 TIMES DAILY
Status: DISCONTINUED | OUTPATIENT
Start: 2024-09-17 | End: 2024-09-24 | Stop reason: HOSPADM

## 2024-09-17 RX ORDER — METOPROLOL TARTRATE 25 MG/1
25 TABLET, FILM COATED ORAL 2 TIMES DAILY
Status: DISCONTINUED | OUTPATIENT
Start: 2024-09-17 | End: 2024-09-17

## 2024-09-17 RX ORDER — SODIUM CHLORIDE 9 MG/ML
50 INJECTION, SOLUTION INTRAVENOUS CONTINUOUS
Status: DISCONTINUED | OUTPATIENT
Start: 2024-09-17 | End: 2024-09-17

## 2024-09-17 RX ORDER — LEVOTHYROXINE SODIUM 88 UG/1
88 TABLET ORAL
Status: DISCONTINUED | OUTPATIENT
Start: 2024-09-17 | End: 2024-09-24 | Stop reason: HOSPADM

## 2024-09-17 SDOH — SOCIAL STABILITY: SOCIAL INSECURITY: ARE YOU OR HAVE YOU BEEN THREATENED OR ABUSED PHYSICALLY, EMOTIONALLY, OR SEXUALLY BY ANYONE?: NO

## 2024-09-17 SDOH — SOCIAL STABILITY: SOCIAL INSECURITY: HAS ANYONE EVER THREATENED TO HURT YOUR FAMILY OR YOUR PETS?: NO

## 2024-09-17 SDOH — SOCIAL STABILITY: SOCIAL INSECURITY: HAVE YOU HAD THOUGHTS OF HARMING ANYONE ELSE?: NO

## 2024-09-17 SDOH — SOCIAL STABILITY: SOCIAL INSECURITY: DO YOU FEEL UNSAFE GOING BACK TO THE PLACE WHERE YOU ARE LIVING?: NO

## 2024-09-17 SDOH — SOCIAL STABILITY: SOCIAL INSECURITY: DO YOU FEEL ANYONE HAS EXPLOITED OR TAKEN ADVANTAGE OF YOU FINANCIALLY OR OF YOUR PERSONAL PROPERTY?: NO

## 2024-09-17 SDOH — SOCIAL STABILITY: SOCIAL INSECURITY: HAVE YOU HAD ANY THOUGHTS OF HARMING ANYONE ELSE?: NO

## 2024-09-17 SDOH — SOCIAL STABILITY: SOCIAL INSECURITY: ABUSE: ADULT

## 2024-09-17 SDOH — SOCIAL STABILITY: SOCIAL INSECURITY: ARE THERE ANY APPARENT SIGNS OF INJURIES/BEHAVIORS THAT COULD BE RELATED TO ABUSE/NEGLECT?: NO

## 2024-09-17 SDOH — SOCIAL STABILITY: SOCIAL INSECURITY: WERE YOU ABLE TO COMPLETE ALL THE BEHAVIORAL HEALTH SCREENINGS?: YES

## 2024-09-17 SDOH — SOCIAL STABILITY: SOCIAL INSECURITY: DOES ANYONE TRY TO KEEP YOU FROM HAVING/CONTACTING OTHER FRIENDS OR DOING THINGS OUTSIDE YOUR HOME?: NO

## 2024-09-17 ASSESSMENT — LIFESTYLE VARIABLES
AUDIT-C TOTAL SCORE: 0
HOW OFTEN DO YOU HAVE 6 OR MORE DRINKS ON ONE OCCASION: NEVER
SKIP TO QUESTIONS 9-10: 1
HOW OFTEN DO YOU HAVE A DRINK CONTAINING ALCOHOL: NEVER
AUDIT-C TOTAL SCORE: 0
HOW MANY STANDARD DRINKS CONTAINING ALCOHOL DO YOU HAVE ON A TYPICAL DAY: PATIENT DOES NOT DRINK

## 2024-09-17 ASSESSMENT — COGNITIVE AND FUNCTIONAL STATUS - GENERAL
EATING MEALS: A LITTLE
MOVING TO AND FROM BED TO CHAIR: A LOT
MOVING FROM LYING ON BACK TO SITTING ON SIDE OF FLAT BED WITH BEDRAILS: A LOT
DRESSING REGULAR LOWER BODY CLOTHING: A LOT
HELP NEEDED FOR BATHING: A LOT
WALKING IN HOSPITAL ROOM: A LOT
WALKING IN HOSPITAL ROOM: TOTAL
TOILETING: A LOT
PATIENT BASELINE BEDBOUND: NO
DAILY ACTIVITIY SCORE: 10
MOBILITY SCORE: 12
TOILETING: TOTAL
CLIMB 3 TO 5 STEPS WITH RAILING: TOTAL
PERSONAL GROOMING: A LOT
CLIMB 3 TO 5 STEPS WITH RAILING: A LOT
TOILETING: A LOT
DRESSING REGULAR UPPER BODY CLOTHING: A LITTLE
HELP NEEDED FOR BATHING: TOTAL
TURNING FROM BACK TO SIDE WHILE IN FLAT BAD: A LOT
DAILY ACTIVITIY SCORE: 15
DRESSING REGULAR LOWER BODY CLOTHING: A LOT
EATING MEALS: A LITTLE
PERSONAL GROOMING: A LITTLE
MOBILITY SCORE: 12
PERSONAL GROOMING: A LITTLE
MOBILITY SCORE: 8
CLIMB 3 TO 5 STEPS WITH RAILING: A LOT
TURNING FROM BACK TO SIDE WHILE IN FLAT BAD: A LOT
TURNING FROM BACK TO SIDE WHILE IN FLAT BAD: A LOT
DRESSING REGULAR UPPER BODY CLOTHING: A LOT
STANDING UP FROM CHAIR USING ARMS: A LOT
MOVING FROM LYING ON BACK TO SITTING ON SIDE OF FLAT BED WITH BEDRAILS: A LOT
STANDING UP FROM CHAIR USING ARMS: A LOT
EATING MEALS: A LITTLE
HELP NEEDED FOR BATHING: A LOT
MOVING TO AND FROM BED TO CHAIR: TOTAL
MOVING TO AND FROM BED TO CHAIR: A LOT
DAILY ACTIVITIY SCORE: 15
STANDING UP FROM CHAIR USING ARMS: TOTAL
DRESSING REGULAR UPPER BODY CLOTHING: A LITTLE
WALKING IN HOSPITAL ROOM: A LOT
MOVING FROM LYING ON BACK TO SITTING ON SIDE OF FLAT BED WITH BEDRAILS: A LOT
DRESSING REGULAR LOWER BODY CLOTHING: TOTAL

## 2024-09-17 ASSESSMENT — ACTIVITIES OF DAILY LIVING (ADL)
HEARING - LEFT EAR: FUNCTIONAL
HEARING - RIGHT EAR: FUNCTIONAL
PATIENT'S MEMORY ADEQUATE TO SAFELY COMPLETE DAILY ACTIVITIES?: NO
WALKS IN HOME: NEEDS ASSISTANCE
BATHING: NEEDS ASSISTANCE
JUDGMENT_ADEQUATE_SAFELY_COMPLETE_DAILY_ACTIVITIES: NO
TOILETING: NEEDS ASSISTANCE
FEEDING YOURSELF: INDEPENDENT
LACK_OF_TRANSPORTATION: PATIENT DECLINED
DRESSING YOURSELF: NEEDS ASSISTANCE
ADEQUATE_TO_COMPLETE_ADL: YES
GROOMING: NEEDS ASSISTANCE

## 2024-09-17 ASSESSMENT — PAIN SCALES - GENERAL
PAINLEVEL_OUTOF10: 0 - NO PAIN

## 2024-09-17 ASSESSMENT — PAIN - FUNCTIONAL ASSESSMENT
PAIN_FUNCTIONAL_ASSESSMENT: 0-10
PAIN_FUNCTIONAL_ASSESSMENT: 0-10

## 2024-09-17 NOTE — CONSULTS
"Reason For Consult  Hospice    History Of Present Illness  Shona Everett is a 89 y.o. female presenting with history of advanced dementia, a mechanical valve on Coumadin, amaurosis fugax, arctic valve stenosis, hyperlipidemia, paroxysmal A-fib, peripheral vascular disease, CABG, chronic kidney disease, hyperglycemia, cognitive decline, anemia who presents today for evaluation of A-fib RVR. Chest xray confirmed presence of pneumonia, to which the pt is already on antibiotics. In the ED goals of care were discussed and pts son stated that they wished to focus on comfort for the pt. Palliative and hospice consults were placed.      Past Medical History  She has a past medical history of Dysuria, Nonrheumatic aortic (valve) stenosis (07/13/2016), Personal history of other diseases of the circulatory system (06/26/2016), Personal history of other diseases of urinary system, Personal history of other endocrine, nutritional and metabolic disease (07/13/2016), Personal history of other specified conditions, and Personal history of other specified conditions (07/13/2016).         Assessment/Plan     Pt resting in bed at this time, no family present bedside. Phone call placed to pts son Delfino. He states that they are interested in meeting with hospice for more information. Delfino would also like to hear from the MD regarding labs and \"how bad of shape she is in\" before making any decisions. Referral placed to [facility] to arrange a meeting with Delfino.    Hospice meeting scheduled for this evening at 530pm    650P  Family met with [facility] to discuss hospice services. They do not wish to move forward with hospice care at this time and have instead decided to move forward with [facility] palliative program- Navigator at time of discharge. Family intends to take pt home at time of discharge with outpatient palliative care following.    No further palliative needs will sign off at this time.             Alvina Mills, " RN

## 2024-09-17 NOTE — PROGRESS NOTES
Speech-Language Pathology    SLP Adult Inpatient Speech-Language Pathology Clinical Swallow Evaluation    Patient Name: Shona Everett  MRN: [id number]  Today's Date: 9/17/2024   Time Calculation  Start Time: 1349  Stop Time: 1414  Time Calculation (min): 25 min         Current Problem:   1. Atrial fibrillation with RVR (Multi)        2. Sepsis, due to unspecified organism, unspecified whether acute organ dysfunction present (Multi)        3. Pneumonia due to infectious organism, unspecified laterality, unspecified part of lung        4. Elevated brain natriuretic peptide (BNP) level        5. Supratherapeutic INR        6. DALTON (acute kidney injury) (CMS-HCC)        7. Hypokalemia          Recommendations:      Solid Diet Recommendations : Regular (IDDSI Level 7)   Liquid Diet Recommendations: Thin (IDDSI Level 0)   Compensatory Swallowing Strategies: Upright 90 degrees as possible for all oral intake, Remain upright for 20-30 minutes after meals, Alternate solids and liquids, Small bites/sips, Eat/feed slowly     Medication Administration Recommendations: Whole, With Liquid, With Pureed      Assessment:  Consistencies Trialed: Consistencies Trialed: Thin (IDDSI Level 0) - Cup, Thin (IDDSI Level 0) - Straw, Pureed/extremely thick (IDDSI Level 4), Regular (IDDSI Level 7)   Oral Motor: Within Functional Limits      Oral/Motor Assessment  Dentition: Adequate/Natural  Oral Motor: Within Functional Limits  Assessment Results: Patient presents with concern for oropharyngeal dysphagia upon completion of clinical swallow evaluation at bedside this date. Examination of oral mechanism was unremarkable. [place] Swallow Protocol (3 oz water challenge) deferred due to cognition. Patient tolerated PO trials of thin liquids via cup/straw, pureed solids, and regular solids absent of overt s/s of aspiration. Aspiration unable to be fully ruled out at the bedside. Due to current pneumonia an instrumental assessment is warranted to fully  evaluate swallow physiology.  Per this assessment, patient is appropriate to continue baseline diet as medically indicated with standard aspiration precautions.     Prognosis: Good   Medical Staff Made Aware: Yes     Baseline Assessment:  Baseline Assessment  Respiratory Status: Oxygen via nasal cannula (3L)  Patient Positioning: Upright in Chair     Relevant Imaging:  CXR IMPRESSION:  Dense consolidation at the right lung base consistent with pneumonia.    Plan:  SLP Plan: Skilled SLP Skilled speech therapy for dysphagia treatment is warranted in order to provide training and instruction regarding the use of compensatory swallow strategies, oropharyngeal strengthening exercises, and pt/caregiver education in order to reduce risk of aspiration, dehydration and malnutrition.  SLP Frequency:  (TBD pending MBSS)   Duration: 2 weeks   Next Treatment Priority: MBSS   Discussed POC: Patient, Nursing, Caregiver/family   Discussed Risks/Benefits: Yes   Patient/Caregiver Agreeable: Yes   Patient will be discontinued from speech therapy when no further skilled needs are identified in this setting.        Goals: Established 9/17/24  Patient will:  Patient/Caregiver will demonstrate knowledge of taught compensatory strategies and dietary consistency recommendations to optimize functional swallow function without overt clinical signs and symptoms of aspiration or dysphagia  Complete a modified barium swallow study (MBSS) as warranted upon further assessment/discussion with medical team for objective assessment of oropharyngeal swallow function, to assess for aspiration, and to guide further recommendations and treatment plan. Scheduled for 9/18    Subjective   Pt lives with her son and DIL. Neither report any concerns with PO, mainly pill dysphagia. Pt cuts up her food and self feeds at baseline. No reported coughing with meals at home. Pt is A&Ox1. Alert to person this date and cooperative for the evaluation.    General Visit  Information:  Pt. Admitted  9/16/24  Past medical history: Past Medical History Relevant to Rehab: 89 y.o. female presenting with initial concern for increasing weakness with difficulty ambulating.  Patient has a well-documented history of dementia.  History of mechanical heart valve on Coumadin, amaurosis fugax, paroxysmal atrial fibrillation, CABG, CKD.  At baseline, she is described as ANO x 1.  She is a confirmed DNR CC.  On arrival, patient was noted to be in atrial fibrillation with rapid ventricular response.  Heart rates were initially in the 130s to 140s and she was treated with IV Lopressor.  Heart rates have improved, currently in the low 100s.  Additional workup revealed a dense consolidation at the right lung base consistent with pneumonia.    Living Environment: Home, Live with __ (son and DIL)     Ordering Physician: Florencio Flannery MD     Reason for Referral: Patient was seen for a clinical swallow evaluation (CSE) to assess swallow function, determine least restrictive diet, determine if a modified barium swallow study is warranted and develop appropriate POC for the current setting. Family reports patient has occasional difficulty with larger pills.      Current Diet : NPO   Prior to Session Communication: Bedside nurse   Pain:  Pain Assessment  Pain Assessment: 0-10  0-10 (Numeric) Pain Score: 0 - No pain     Treatment: SLP provided education to the patient's son and DIL regarding the results of the clinical swallow evaluation. Orders were already placed for a modified barium swallow study (MBSS). Education was provided as to the rationale to complete the study, what information we hope to obtain from the study and ultimately determine the least restrictive diet with minimal aspiration risk. Family is in agreement to complete a MBSS.    Education:  Learner:  Family, Caregiver  Barriers to Learning: Cognitive limitations   Method: Verbal  Education - Topic: ST provided patient  education regarding role of ST, purpose of assessment, clinical impressions, goals of treatment, and plan of care. Patient verbalized full comprehension, consistent with cognitive status. Education will be reinforced. ST further coordinated with RN regarding recommendations and precautions per this assessment, with RN verbalizing understanding.  Outcome:  Verbalized understanding and agreement

## 2024-09-17 NOTE — PROGRESS NOTES
Shona Everett is a 89 y.o. female on day 1 of admission presenting with Pneumonia.      Subjective   She has no complaints this morning. Thought she went to school with me at one point. Feels we are in a school. No complaints.       Objective     Last Recorded Vitals  /60 (BP Location: Right arm, Patient Position: Lying)   Pulse (!) 122   Temp 36.9 °C (98.4 °F) (Temporal)   Resp 18   Wt 74 kg (163 lb 2.3 oz)   SpO2 92%   Intake/Output last 3 Shifts:    Intake/Output Summary (Last 24 hours) at 9/17/2024 1105  Last data filed at 9/17/2024 1018  Gross per 24 hour   Intake 460 ml   Output --   Net 460 ml       Admission Weight  Weight: 74.8 kg (165 lb) (09/16/24 1904)    Daily Weight  09/16/24 : 74 kg (163 lb 2.3 oz)    Image Results  ECG 12 lead  Atrial fibrillation with rapid ventricular response  Right axis deviation  Septal infarct (cited on or before 16-SEP-2024)  Marked ST abnormality, possible inferior subendocardial injury  Abnormal ECG  When compared with ECG of 03-JAN-2023 13:41,  Previous ECG has undetermined rhythm, needs review  Serial changes of evolving Septal infarct Present      Physical Exam  Constitutional:       General: She is not in acute distress.     Appearance: Normal appearance.   HENT:      Head: Normocephalic and atraumatic.      Mouth/Throat:      Mouth: Mucous membranes are moist.   Eyes:      Extraocular Movements: Extraocular movements intact.      Conjunctiva/sclera: Conjunctivae normal.      Pupils: Pupils are equal, round, and reactive to light.   Cardiovascular:      Rate and Rhythm: Normal rate and regular rhythm.      Heart sounds: Normal heart sounds.   Pulmonary:      Effort: Pulmonary effort is normal.      Breath sounds: Normal breath sounds. No wheezing, rhonchi or rales.   Abdominal:      General: Abdomen is flat. Bowel sounds are normal.      Palpations: Abdomen is soft.   Musculoskeletal:         General: No swelling or tenderness. Normal range of motion.       Cervical back: Normal range of motion and neck supple.   Skin:     General: Skin is warm and dry.      Findings: No rash.   Neurological:      General: No focal deficit present.      Mental Status: She is alert. Mental status is at baseline. She is disoriented.      Cranial Nerves: No cranial nerve deficit.      Sensory: No sensory deficit.   Psychiatric:         Mood and Affect: Mood normal.      Comments: Poor to no insight currently         Relevant Results  Scheduled medications  levoFLOXacin, 750 mg, intravenous, q48h  levothyroxine, 88 mcg, oral, Daily  metoprolol succinate XL, 50 mg, oral, BID  oxygen, , inhalation, Continuous - Inhalation  sertraline, 25 mg, oral, Daily      Continuous medications  [Held by provider] potassium chloride in 0.9%NaCl, 50 mL/hr, Last Rate: Stopped (09/17/24 0804)      PRN medications  PRN medications: acetaminophen, melatonin, [DISCONTINUED] ondansetron ODT **OR** ondansetron    Results for orders placed or performed during the hospital encounter of 09/16/24 (from the past 24 hour(s))   ECG 12 lead   Result Value Ref Range    Ventricular Rate 138 BPM    Atrial Rate 115 BPM    QRS Duration 106 ms    QT Interval 328 ms    QTC Calculation(Bazett) 496 ms    R Axis 122 degrees    T Axis -43 degrees    QRS Count 23 beats    Q Onset 222 ms    T Offset 386 ms    QTC Fredericia 432 ms   CBC with Differential   Result Value Ref Range    WBC 25.8 (H) 4.4 - 11.3 x10*3/uL    nRBC 0.0 0.0 - 0.0 /100 WBCs    RBC 4.04 4.00 - 5.20 x10*6/uL    Hemoglobin 11.5 (L) 12.0 - 16.0 g/dL    Hematocrit 35.0 (L) 36.0 - 46.0 %    MCV 87 80 - 100 fL    MCH 28.5 26.0 - 34.0 pg    MCHC 32.9 32.0 - 36.0 g/dL    RDW 15.6 (H) 11.5 - 14.5 %    Platelets 239 150 - 450 x10*3/uL    Immature Granulocytes %, Automated 2.0 (H) 0.0 - 0.9 %    Immature Granulocytes Absolute, Automated 0.51 (H) 0.00 - 0.50 x10*3/uL   Comprehensive Metabolic Panel   Result Value Ref Range    Glucose 170 (H) 74 - 99 mg/dL    Sodium 138 136 -  145 mmol/L    Potassium 3.0 (L) 3.5 - 5.3 mmol/L    Chloride 95 (L) 98 - 107 mmol/L    Bicarbonate 30 21 - 32 mmol/L    Anion Gap 16 10 - 20 mmol/L    Urea Nitrogen 29 (H) 6 - 23 mg/dL    Creatinine 1.23 (H) 0.50 - 1.05 mg/dL    eGFR 42 (L) >60 mL/min/1.73m*2    Calcium 9.5 8.6 - 10.3 mg/dL    Albumin 4.1 3.4 - 5.0 g/dL    Alkaline Phosphatase 78 33 - 136 U/L    Total Protein 7.6 6.4 - 8.2 g/dL    AST 22 9 - 39 U/L    Bilirubin, Total 1.4 (H) 0.0 - 1.2 mg/dL    ALT 20 7 - 45 U/L   Lactate   Result Value Ref Range    Lactate 2.2 (H) 0.4 - 2.0 mmol/L   B-type Natriuretic Peptide   Result Value Ref Range    BNP 1,621 (H) 0 - 99 pg/mL   TSH with reflex to Free T4 if abnormal   Result Value Ref Range    Thyroid Stimulating Hormone 0.62 0.44 - 3.98 mIU/L   Coagulation Screen   Result Value Ref Range    Protime 56.0 (HH) 9.8 - 12.8 seconds    INR 4.9 (H) 0.9 - 1.1    aPTT 51 (H) 27 - 38 seconds   Manual Differential   Result Value Ref Range    Neutrophils %, Manual 73.0 40.0 - 80.0 %    Bands %, Manual 19.0 0.0 - 5.0 %    Lymphocytes %, Manual 4.0 13.0 - 44.0 %    Monocytes %, Manual 4.0 2.0 - 10.0 %    Eosinophils %, Manual 0.0 0.0 - 6.0 %    Basophils %, Manual 0.0 0.0 - 2.0 %    Seg Neutrophils Absolute, Manual 18.83 (H) 1.60 - 5.00 x10*3/uL    Bands Absolute, Manual 4.90 (H) 0.00 - 0.50 x10*3/uL    Lymphocytes Absolute, Manual 1.03 0.80 - 3.00 x10*3/uL    Monocytes Absolute, Manual 1.03 (H) 0.05 - 0.80 x10*3/uL    Eosinophils Absolute, Manual 0.00 0.00 - 0.40 x10*3/uL    Basophils Absolute, Manual 0.00 0.00 - 0.10 x10*3/uL    Total Cells Counted 100     Neutrophils Absolute, Manual 23.73 (H) 1.60 - 5.50 x10*3/uL    RBC Morphology See Below     Ovalocytes Few     Vacuolated Neutrophils Present    Lactate   Result Value Ref Range    Lactate 2.5 (H) 0.4 - 2.0 mmol/L   Blood Culture    Specimen: Peripheral Venipuncture; Blood culture   Result Value Ref Range    Blood Culture Loaded on Instrument - Culture in progress     Blood Culture    Specimen: Peripheral Venipuncture; Blood culture   Result Value Ref Range    Blood Culture Loaded on Instrument - Culture in progress    Urinalysis with Reflex Microscopic   Result Value Ref Range    Color, Urine Light-Yellow Light-Yellow, Yellow, Dark-Yellow    Appearance, Urine Clear Clear    Specific Gravity, Urine 1.009 1.005 - 1.035    pH, Urine 6.5 5.0, 5.5, 6.0, 6.5, 7.0, 7.5, 8.0    Protein, Urine NEGATIVE NEGATIVE, 10 (TRACE), 20 (TRACE) mg/dL    Glucose, Urine Normal Normal mg/dL    Blood, Urine NEGATIVE NEGATIVE    Ketones, Urine NEGATIVE NEGATIVE mg/dL    Bilirubin, Urine NEGATIVE NEGATIVE    Urobilinogen, Urine Normal Normal mg/dL    Nitrite, Urine NEGATIVE NEGATIVE    Leukocyte Esterase, Urine NEGATIVE NEGATIVE   MRSA Surveillance for Vancomycin De-escalation, PCR    Specimen: Anterior Nares; Swab   Result Value Ref Range    MRSA PCR Not Detected Not Detected   Lactate   Result Value Ref Range    Lactate 1.6 0.4 - 2.0 mmol/L   Lavender Top   Result Value Ref Range    Extra Tube Hold for add-ons.    PST Top   Result Value Ref Range    Extra Tube Hold for add-ons.        ECG 12 lead    Result Date: 9/17/2024  Atrial fibrillation with rapid ventricular response Right axis deviation Septal infarct (cited on or before 16-SEP-2024) Marked ST abnormality, possible inferior subendocardial injury Abnormal ECG When compared with ECG of 03-JAN-2023 13:41, Previous ECG has undetermined rhythm, needs review Serial changes of evolving Septal infarct Present    XR chest 1 view    Result Date: 9/16/2024  Interpreted By:  Olman Bowie, STUDY: XR CHEST 1 VIEW;  9/16/2024 9:00 pm   INDICATION: Signs/Symptoms:heart failure vs pneumonia.     COMPARISON: 01/03/2023   ACCESSION NUMBER(S): [id number]   ORDERING CLINICIAN: JOSE ANGEL COATS   FINDINGS:     The heart is enlarged. Mild vascular congestion and peribronchovascular interstitial thickening is noted. Median sternotomy noted. Atherosclerotic  aorta. New area of dense consolidation at the right peripheral lung base, likely right middle lobe.         Dense consolidation at the right lung base consistent with pneumonia.   Cardiomegaly with baseline bronchovascular interstitial prominence that likely relates to a degree of interstitial edema.   MACRO: None.   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 pending goals of care discussion with palliative care and hospice      Nazario Flannery MD

## 2024-09-17 NOTE — ASSESSMENT & PLAN NOTE
Right lower lobe dense consolidation.  Noted anaphylaxis to penicillins.  Due to the reported anaphylaxis, want to avoid penicillins and cephalosporins.  However she did tolerate cefepime in the emergency department.  I have an abundance of caution, will not continue cefepime.  And for ease of administration given her palliative goals, will opt for Levaquin 750 mg every 48 hours.  Noted to be on Coumadin.  Will need careful monitoring of INR with the use of IV antibiotics.  Same concern would have existed with the use of cefepime and Flagyl.  Supportive measures including DuoNebs every 4 hours as needed, Robitussin DM for cough/congestion

## 2024-09-17 NOTE — ASSESSMENT & PLAN NOTE
Secondary to the above.  In discussion with family, they are comfortable with the use of supplemental oxygen but would not want more aggressive therapy beyond nasal cannula.  Patient routinely displaces her nasal cannula prongs at the time of my assessment, had them placed above her forehead.  When reminded of proper placement, patient is amenable to allowing this physician to replace and resecure her oxygen however continues to have difficulty keeping it in place.

## 2024-09-17 NOTE — ASSESSMENT & PLAN NOTE
Atrial fibrillation with RVR.  Initially responding well to as needed Lopressor.  Started on metoprolol tartrate 25 mg twice daily with a dose given on arrival to the floor.  However outpatient records note her standard regiment is to utilize Toprol XL 50 mg twice daily.  The order has been modified to reflect this.  Will continue Toprol-XL 50 mg twice daily.

## 2024-09-17 NOTE — ASSESSMENT & PLAN NOTE
Patient with advanced dementia and no documentation of behavioral disturbances or aggressive features.  That is consistent with her presentation overnight.  Although she is confused, she is redirectable and has remained calm and pleasant.  Per ED discussion with family, goal is to pursue palliative measures with hospice consultation.  Palliative service has been consulted at this facility to facilitate goals of care.  Patient is medically appropriate for hospice evaluation.

## 2024-09-17 NOTE — NURSING NOTE
Per hospice of the [facility] nurse- family will not be signing with hospice at this time. However, they will be following up with hospice of the [facility]'s palliative program.

## 2024-09-17 NOTE — H&P
History Of Present Illness  Shona Everett is a 89 y.o. female presenting with initial concern for increasing weakness with difficulty ambulating.  Patient has a well-documented history of dementia.  History of mechanical heart valve on Coumadin, amaurosis fugax, paroxysmal atrial fibrillation, CABG, CKD.  At baseline, she is described as ANO x 1.  She is a confirmed DNR CC.  On arrival, patient was noted to be in atrial fibrillation with rapid ventricular response.  Heart rates were initially in the 130s to 140s and she was treated with IV Lopressor.  Heart rates have improved, currently in the low 100s.  Additional workup revealed a dense consolidation at the right lung base consistent with pneumonia.  Patient with documented anaphylaxis to penicillins.  Was started on combination vancomycin, cefepime, and Flagyl.  In discussion with patient's family, ED reports family is most interested in pursuing palliative measures and possible hospice consultation.  They are comfortable with medications for rate control, IV fluids, and treatments generally thought to be minimally invasive however would not want aggressive care.  Admitted for ongoing management.     Past Medical History  Past Medical History:   Diagnosis Date    Dysuria     Dysuria    Nonrheumatic aortic (valve) stenosis 07/13/2016    Aortic stenosis, severe    Personal history of other diseases of the circulatory system 06/26/2016    History of coronary atherosclerosis    Personal history of other diseases of urinary system     History of hematuria    Personal history of other endocrine, nutritional and metabolic disease 07/13/2016    History of hyperlipidemia    Personal history of other specified conditions     History of chest pain    Personal history of other specified conditions 07/13/2016    History of shortness of breath       Surgical History  Past Surgical History:   Procedure Laterality Date    AORTIC VALVE REPLACEMENT  08/25/2016    Aortic Valve  "Replacement    CORONARY ARTERY BYPASS GRAFT  08/25/2016    CABG    HYSTERECTOMY  10/24/2016    Hysterectomy    MR HEAD ANGIO WO IV CONTRAST  7/18/2022    MR HEAD ANGIO WO IV CONTRAST 7/18/2022 [facility]ICAL [facility]GAC[facility]    MR HEAD ANGIO WO IV CONTRAST  7/19/2022    MR HEAD ANGIO WO IV CONTRAST    MR NECK ANGIO WO IV CONTRAST  7/18/2022    MR NECK ANGIO WO IV CONTRAST 7/18/2022 [facility] CLINICAL LEGAC[facility]    OTHER SURGICAL HISTORY  07/13/2016    Endarterectomy Carotid Artery    OTHER SURGICAL HISTORY  11/04/2021    Percutaneous transluminal coronary angioplasty    OTHER SURGICAL HISTORY  11/04/2021    Carotid thromboendarterectomy    OTHER SURGICAL HISTORY  08/26/2022    Hip surgery    OTHER SURGICAL HISTORY  07/16/2020    Colpocleisis    OTHER SURGICAL HISTORY  10/24/2016    Carotid Artery Catheterization        Social History  She reports that she has never smoked. She has never used smokeless tobacco. She reports that she does not drink alcohol and does not use drugs.    Family History  Family History   Problem Relation Name Age of Onset    Heart attack Mother      Stroke Father      Other (dyslipidemia) Other Multiple Family Members     Other (cerebrovascular disorder) Other Multiple Family Members     Coronary artery disease Other Multiple Family Members     Hypertension Other Multiple Family Members     Lung disease Other Multiple Family Members         Allergies  Penicillins and Ampicillin    Review of Systems   Unable to perform ROS: Dementia   Constitutional:         Patient will reply that she is feeling either \"fine\" or \"okay\" when asked about perceived problems or discomfort.  She denies any discomfort and appears confused but in no significant distress        Physical Exam  Vitals and nursing note reviewed.   Constitutional:       Appearance: She is ill-appearing.   HENT:      Head: Normocephalic and atraumatic.      Mouth/Throat:      Mouth: Mucous membranes are dry.   Eyes:      Extraocular Movements: Extraocular " "movements intact.      Conjunctiva/sclera: Conjunctivae normal.   Cardiovascular:      Rate and Rhythm: Normal rate. Rhythm irregular.      Pulses: Normal pulses.   Pulmonary:      Effort: Pulmonary effort is normal.      Breath sounds: Normal breath sounds and air entry.   Abdominal:      General: Bowel sounds are normal.      Palpations: Abdomen is soft.   Musculoskeletal:      Cervical back: Normal range of motion and neck supple.   Skin:     General: Skin is warm and dry.   Neurological:      General: No focal deficit present.      Mental Status: She is alert. Mental status is at baseline. She is disoriented.   Psychiatric:         Mood and Affect: Mood normal.         Behavior: Behavior normal. Behavior is cooperative.          Last Recorded Vitals  Blood pressure 147/77, pulse (!) 108, temperature 37.4 °C (99.3 °F), temperature source Temporal, resp. rate 16, height 1.549 m (5' 1\"), weight 74.8 kg (165 lb), SpO2 98%.    Relevant Results  Scheduled medications  levoFLOXacin, 750 mg, intravenous, q48h  metoprolol tartrate, 25 mg, oral, BID  oxygen, , inhalation, Continuous - Inhalation      Continuous medications  potassium chloride in 0.9%NaCl, 50 mL/hr, Last Rate: 50 mL/hr (09/17/24 0152)      PRN medications  PRN medications: acetaminophen **OR** acetaminophen **OR** acetaminophen, acetaminophen **OR** acetaminophen **OR** acetaminophen, melatonin, ondansetron ODT **OR** ondansetron  XR chest 1 view    Result Date: 9/16/2024  Interpreted By:  Olman Bowie, STUDY: XR CHEST 1 VIEW;  9/16/2024 9:00 pm   INDICATION: Signs/Symptoms:heart failure vs pneumonia.     COMPARISON: 01/03/2023   ACCESSION NUMBER(S): [id number]   ORDERING CLINICIAN: JOSE ANGEL COATS   FINDINGS:     The heart is enlarged. Mild vascular congestion and peribronchovascular interstitial thickening is noted. Median sternotomy noted. Atherosclerotic aorta. New area of dense consolidation at the right peripheral lung base, likely right " middle lobe.         Dense consolidation at the right lung base consistent with pneumonia.   Cardiomegaly with baseline bronchovascular interstitial prominence that likely relates to a degree of interstitial edema.   MACRO: None.   Signed by: Olman Bowie 9/16/2024 9:07 PM Dictation workstation:   [id number]   Results for orders placed or performed during the hospital encounter of 09/16/24 (from the past 24 hour(s))   CBC with Differential   Result Value Ref Range    WBC 25.8 (H) 4.4 - 11.3 x10*3/uL    nRBC 0.0 0.0 - 0.0 /100 WBCs    RBC 4.04 4.00 - 5.20 x10*6/uL    Hemoglobin 11.5 (L) 12.0 - 16.0 g/dL    Hematocrit 35.0 (L) 36.0 - 46.0 %    MCV 87 80 - 100 fL    MCH 28.5 26.0 - 34.0 pg    MCHC 32.9 32.0 - 36.0 g/dL    RDW 15.6 (H) 11.5 - 14.5 %    Platelets 239 150 - 450 x10*3/uL    Immature Granulocytes %, Automated 2.0 (H) 0.0 - 0.9 %    Immature Granulocytes Absolute, Automated 0.51 (H) 0.00 - 0.50 x10*3/uL   Comprehensive Metabolic Panel   Result Value Ref Range    Glucose 170 (H) 74 - 99 mg/dL    Sodium 138 136 - 145 mmol/L    Potassium 3.0 (L) 3.5 - 5.3 mmol/L    Chloride 95 (L) 98 - 107 mmol/L    Bicarbonate 30 21 - 32 mmol/L    Anion Gap 16 10 - 20 mmol/L    Urea Nitrogen 29 (H) 6 - 23 mg/dL    Creatinine 1.23 (H) 0.50 - 1.05 mg/dL    eGFR 42 (L) >60 mL/min/1.73m*2    Calcium 9.5 8.6 - 10.3 mg/dL    Albumin 4.1 3.4 - 5.0 g/dL    Alkaline Phosphatase 78 33 - 136 U/L    Total Protein 7.6 6.4 - 8.2 g/dL    AST 22 9 - 39 U/L    Bilirubin, Total 1.4 (H) 0.0 - 1.2 mg/dL    ALT 20 7 - 45 U/L   Lactate   Result Value Ref Range    Lactate 2.2 (H) 0.4 - 2.0 mmol/L   B-type Natriuretic Peptide   Result Value Ref Range    BNP 1,621 (H) 0 - 99 pg/mL   TSH with reflex to Free T4 if abnormal   Result Value Ref Range    Thyroid Stimulating Hormone 0.62 0.44 - 3.98 mIU/L   Coagulation Screen   Result Value Ref Range    Protime 56.0 (HH) 9.8 - 12.8 seconds    INR 4.9 (H) 0.9 - 1.1    aPTT 51 (H) 27 - 38 seconds    Manual Differential   Result Value Ref Range    Neutrophils %, Manual 73.0 40.0 - 80.0 %    Bands %, Manual 19.0 0.0 - 5.0 %    Lymphocytes %, Manual 4.0 13.0 - 44.0 %    Monocytes %, Manual 4.0 2.0 - 10.0 %    Eosinophils %, Manual 0.0 0.0 - 6.0 %    Basophils %, Manual 0.0 0.0 - 2.0 %    Seg Neutrophils Absolute, Manual 18.83 (H) 1.60 - 5.00 x10*3/uL    Bands Absolute, Manual 4.90 (H) 0.00 - 0.50 x10*3/uL    Lymphocytes Absolute, Manual 1.03 0.80 - 3.00 x10*3/uL    Monocytes Absolute, Manual 1.03 (H) 0.05 - 0.80 x10*3/uL    Eosinophils Absolute, Manual 0.00 0.00 - 0.40 x10*3/uL    Basophils Absolute, Manual 0.00 0.00 - 0.10 x10*3/uL    Total Cells Counted 100     Neutrophils Absolute, Manual 23.73 (H) 1.60 - 5.50 x10*3/uL    RBC Morphology See Below     Ovalocytes Few     Vacuolated Neutrophils Present    Lactate   Result Value Ref Range    Lactate 2.5 (H) 0.4 - 2.0 mmol/L   Blood Culture    Specimen: Peripheral Venipuncture; Blood culture   Result Value Ref Range    Blood Culture Loaded on Instrument - Culture in progress    Blood Culture    Specimen: Peripheral Venipuncture; Blood culture   Result Value Ref Range    Blood Culture Loaded on Instrument - Culture in progress    Urinalysis with Reflex Microscopic   Result Value Ref Range    Color, Urine Light-Yellow Light-Yellow, Yellow, Dark-Yellow    Appearance, Urine Clear Clear    Specific Gravity, Urine 1.009 1.005 - 1.035    pH, Urine 6.5 5.0, 5.5, 6.0, 6.5, 7.0, 7.5, 8.0    Protein, Urine NEGATIVE NEGATIVE, 10 (TRACE), 20 (TRACE) mg/dL    Glucose, Urine Normal Normal mg/dL    Blood, Urine NEGATIVE NEGATIVE    Ketones, Urine NEGATIVE NEGATIVE mg/dL    Bilirubin, Urine NEGATIVE NEGATIVE    Urobilinogen, Urine Normal Normal mg/dL    Nitrite, Urine NEGATIVE NEGATIVE    Leukocyte Esterase, Urine NEGATIVE NEGATIVE   MRSA Surveillance for Vancomycin De-escalation, PCR    Specimen: Anterior Nares; Swab   Result Value Ref Range    MRSA PCR Not Detected Not Detected        Assessment/Plan   Assessment & Plan  Pneumonia  Right lower lobe dense consolidation.  Noted anaphylaxis to penicillins.  Due to the reported anaphylaxis, want to avoid penicillins and cephalosporins.  However she did tolerate cefepime in the emergency department.  I have an abundance of caution, will not continue cefepime.  And for ease of administration given her palliative goals, will opt for Levaquin 750 mg every 48 hours.  Noted to be on Coumadin.  Will need careful monitoring of INR with the use of IV antibiotics.  Same concern would have existed with the use of cefepime and Flagyl.  Supportive measures including DuoNebs every 4 hours as needed, Robitussin DM for cough/congestion  Acute hypoxemic respiratory failure (Multi)  Secondary to the above.  In discussion with family, they are comfortable with the use of supplemental oxygen but would not want more aggressive therapy beyond nasal cannula.  Patient routinely displaces her nasal cannula prongs at the time of my assessment, had them placed above her forehead.  When reminded of proper placement, patient is amenable to allowing this physician to replace and resecure her oxygen however continues to have difficulty keeping it in place.  Atrial fibrillation with RVR (Multi)  Atrial fibrillation with RVR.  Initially responding well to as needed Lopressor.  Started on metoprolol tartrate 25 mg twice daily with a dose given on arrival to the floor.  However outpatient records note her standard regiment is to utilize Toprol XL 50 mg twice daily.  The order has been modified to reflect this.  Will continue Toprol-XL 50 mg twice daily.  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  That is consistent with her presentation overnight.  Although she is confused, she is redirectable and has remained calm and pleasant.   Per ED discussion with family, goal is to pursue palliative measures with hospice consultation.  Palliative service has been consulted at this facility to facilitate goals of care.  Patient is medically appropriate for hospice evaluation.    Diet: As tolerated  Fluids: N/A  DVT prophylaxis: On Coumadin  Telemetry: Not Indicated -DNR CC    Home Medications: Reviewed and reconciled     I spent >75 minutes in the professional and overall care of this patient.      Tomas Dejesus, DO

## 2024-09-17 NOTE — ED PROVIDER NOTES
Emergency Department Provider Note        History of Present Illness     History provided by: Patient and Family Member  Limitations to History: Dementia  External Records Reviewed with Brief Summary:  previous history    HPI:  Patient is an 89-year-old female with history of advanced dementia, a mechanical valve on Coumadin, amaurosis fugax, arctic valve stenosis, hyperlipidemia, paroxysmal A-fib, peripheral vascular disease, CABG, chronic kidney disease, hyperglycemia, cognitive decline, anemia who presents today for evaluation of A-fib RVR, patient presents in A-fib RVR with a rate in the 130s to 140s, she denies any chest pain shortness of breath heart palpitations, when I ask her how patient is feeling she states fine.  Her son and daughter-in-law are at bedside, son states that him and his sibling alternate who she stays with, she just stayed with one of the other siblings for the last 3 days and they just picked her up today and noticed that she was more weak than usual, could not make it down the stairs on her own which is unusual for her.  She is not complaining of anything specific.  Patient is on Coumadin, does have a prior history of A-fib.  She is a DNR.       Physical Exam   Triage vitals:  T 37.4 °C (99.3 °F)  HR (!) 142  /70  RR 20  O2 95 % None (Room air)    Physical Exam  Vitals and nursing note reviewed.   Constitutional:       General: She is not in acute distress.     Appearance: Normal appearance.   HENT:      Head: Normocephalic and atraumatic.      Nose: Nose normal.   Eyes:      Extraocular Movements: Extraocular movements intact.   Cardiovascular:      Rate and Rhythm: Tachycardia present. Rhythm irregular.      Comments: Mechanical valve click is noted, heart irregularly irregular and tachycardic.  Pulmonary:      Effort: Pulmonary effort is normal.      Breath sounds: Rhonchi present.   Abdominal:      Palpations: Abdomen is soft.   Musculoskeletal:         General: Swelling  (pitting edema present) present. Normal range of motion.      Cervical back: Normal range of motion and neck supple.   Skin:     General: Skin is warm and dry.   Neurological:      General: No focal deficit present.      Mental Status: She is alert.   Psychiatric:         Mood and Affect: Mood normal.         Thought Content: Thought content normal.        XR chest 1 view   Final Result   Dense consolidation at the right lung base consistent with pneumonia.        Cardiomegaly with baseline bronchovascular interstitial prominence   that likely relates to a degree of interstitial edema.        MACRO:   None.        Signed by: Olman Bowie 9/16/2024 9:07 PM   Dictation workstation:   [id number]        Labs Reviewed   CBC WITH AUTO DIFFERENTIAL - Abnormal       Result Value    WBC 25.8 (*)     nRBC 0.0      RBC 4.04      Hemoglobin 11.5 (*)     Hematocrit 35.0 (*)     MCV 87      MCH 28.5      MCHC 32.9      RDW 15.6 (*)     Platelets 239      Immature Granulocytes %, Automated 2.0 (*)     Immature Granulocytes Absolute, Automated 0.51 (*)     Narrative:     The previously reported component Neutrophils % is no longer being reported.  The previously reported component Lymphocytes % is no longer being reported.  The previously reported component Monocytes % is no longer being reported.  The previously   reported component Eosinophils % is no longer being reported.  The previously reported component Basophils % is no longer being reported.  The previously reported component Absolute Neutrophils is no longer being reported.  The previously reported   component Absolute Lymphocytes is no longer being reported.  The previously reported component Absolute Monocytes is no longer being reported.  The previously reported component Absolute Eosinophils is no longer being reported.  The previously reported   component Absolute Basophils is no longer being reported.   COMPREHENSIVE METABOLIC PANEL - Abnormal    Glucose 170  (*)     Sodium 138      Potassium 3.0 (*)     Chloride 95 (*)     Bicarbonate 30      Anion Gap 16      Urea Nitrogen 29 (*)     Creatinine 1.23 (*)     eGFR 42 (*)     Calcium 9.5      Albumin 4.1      Alkaline Phosphatase 78      Total Protein 7.6      AST 22      Bilirubin, Total 1.4 (*)     ALT 20     LACTATE - Abnormal    Lactate 2.2 (*)     Narrative:     Venipuncture immediately after or during the administration of Metamizole may lead to falsely low results. Testing should be performed immediately  prior to Metamizole dosing.   B-TYPE NATRIURETIC PEPTIDE - Abnormal    BNP 1,621 (*)     Narrative:        <100 pg/mL - Heart failure unlikely  100-299 pg/mL - Intermediate probability of acute heart                  failure exacerbation. Correlate with clinical                  context and patient history.    >=300 pg/mL - Heart Failure likely. Correlate with clinical                  context and patient history.    BNP testing is performed using different testing methodology at [facility] than at other [facility]. Direct result comparisons should only be made within the same method.      COAGULATION SCREEN - Abnormal    Protime 56.0 (*)     INR 4.9 (*)     aPTT 51 (*)     Narrative:     The APTT is no longer used for monitoring Unfractionated Heparin Therapy. For monitoring Heparin Therapy, use the Heparin Assay.   LACTATE - Abnormal    Lactate 2.5 (*)     Narrative:     Venipuncture immediately after or during the administration of Metamizole may lead to falsely low results. Testing should be performed immediately  prior to Metamizole dosing.   MANUAL DIFFERENTIAL - Abnormal    Neutrophils %, Manual 73.0      Bands %, Manual 19.0      Lymphocytes %, Manual 4.0      Monocytes %, Manual 4.0      Eosinophils %, Manual 0.0      Basophils %, Manual 0.0      Seg Neutrophils Absolute, Manual 18.83 (*)     Bands Absolute, Manual 4.90 (*)     Lymphocytes Absolute, Manual 1.03      Monocytes Absolute,  Manual 1.03 (*)     Eosinophils Absolute, Manual 0.00      Basophils Absolute, Manual 0.00      Total Cells Counted 100      Neutrophils Absolute, Manual 23.73 (*)     RBC Morphology See Below      Ovalocytes Few      Vacuolated Neutrophils Present     URINALYSIS WITH REFLEX MICROSCOPIC - Normal    Color, Urine Light-Yellow      Appearance, Urine Clear      Specific Gravity, Urine 1.009      pH, Urine 6.5      Protein, Urine NEGATIVE      Glucose, Urine Normal      Blood, Urine NEGATIVE      Ketones, Urine NEGATIVE      Bilirubin, Urine NEGATIVE      Urobilinogen, Urine Normal      Nitrite, Urine NEGATIVE      Leukocyte Esterase, Urine NEGATIVE     TSH WITH REFLEX TO FREE T4 IF ABNORMAL - Normal    Thyroid Stimulating Hormone 0.62      Narrative:     TSH testing is performed using different testing methodology at [facility] than at other [facility] hospitals. Direct result comparisons should only be made within the same method.     BLOOD CULTURE   BLOOD CULTURE   MRSA SURVEILLANCE FOR VANCOMYCIN DE-ESCALATION, PCR   LACTATE   LACTATE     ED Course as of 09/16/24 2151   Mon Sep 16, 2024   1926 Had a long bedside conversation with the son and medical power of  regarding goals of care.  DNR CC, only focusing on comfort measures at this time.  Okay with an evaluation for reversible causes of atrial fibrillation with rapid ventricular rate.  Okay with IV metoprolol pushes.  Would not want cardioversion, invasive testing, life-prolonging measures. [JK]   1937 Last echo EF 60-65% [MK]   2012 WBC(!): 25.8  Adding abx [MK]   2023 Lactate(!): 2.2  30 ml/kg of fluid was not given due to concerns for fluid overload, patient has pitting edema with rhonchi on exam and hypoxia requiring 6L oxygen, fluids may offer more harm than benefit and were withheld for this reason. [MK]   2110 XR chest 1 view  Meds ordered to cover for pneumonia [MK]   2151 ECG 12 lead  A-fib RVR with rate of 138, right axis deviation, ST  depressions in the inferior leads as well as V5 and V6.  ST depressions were present on previous EKG in July 16, 2022. [MK]      ED Course User Index  [JK] Flaco Morris DO  [MK] Guerline Wang PA-C         Medical Decision Making & ED Course   Medical Decision Making:    MDM: Patient is a 89-year-old female who is DNR who presents today for generalized weakness, she is arrives in A-fib RVR, she is on Coumadin for mechanical valve, basic labs were obtained, patient was started on 5 mg metoprolol since her blood pressure was able to withstand it, CBC showed a white count of 25.8, patient thus triggering sepsis criteria, sepsis order set was ordered and Vanco cefepime and Flagyl were ordered as well.  Patient is hypokalemic with a potassium of 3, she also has a mild creatinine bump of 1.23 however given that she is clinically overloaded will not give her fluids at this time, she does have a proBNP of 1621, lactate of 2.2, INR is 4.9.  After 5 mg metoprolol heart rate improved to 120 however she still remains in A-fib RVR, thus a second dose was ordered. After 3 doses of metoprolol, patient's heart rate is anywhere from the 100s to over the 1 teens.  Her urinalysis is negative, chest x-ray did confirm the presence of pneumonia for which she is already receiving antibiotics, her CMP showed an elevated creatinine of 1.23 however again in the setting of her fluid overload, she was not administered fluids, patient will be admitted to medicine given that she is DNR, for further evaluation management.  ----      Differential diagnoses considered include but are not limited to: sepsis, afib rvr, uti, pneumonia, etc.     Social Determinants of Health which Significantly Impact Care: None identified     EKG Independent Interpretation: EKG interpreted by myself. Please see ED Course for full interpretation.    Independent Result Review and Interpretation: Relevant laboratory and radiographic results were reviewed and  independently interpreted by myself.  As necessary, they are commented on in the ED Course.    Chronic conditions affecting the patient's care: As documented above in [facility]    The patient was discussed with the following consultants/services: Admission Coordinator who accepted the patient for admission    Care Considerations: As documented above in [facility]    ED Course:  ED Course as of 09/16/24 2151   Mon Sep 16, 2024   1926 Had a long bedside conversation with the son and medical power of  regarding goals of care.  DNR CC, only focusing on comfort measures at this time.  Okay with an evaluation for reversible causes of atrial fibrillation with rapid ventricular rate.  Okay with IV metoprolol pushes.  Would not want cardioversion, invasive testing, life-prolonging measures. [JK]   1937 Last echo EF 60-65% [MK]   2012 WBC(!): 25.8  Adding abx [MK]   2023 Lactate(!): 2.2  30 ml/kg of fluid was not given due to concerns for fluid overload, patient has pitting edema with rhonchi on exam and hypoxia requiring 6L oxygen, fluids may offer more harm than benefit and were withheld for this reason. [MK]   2110 XR chest 1 view  Meds ordered to cover for pneumonia [MK]   2151 ECG 12 lead  A-fib RVR with rate of 138, right axis deviation, ST depressions in the inferior leads as well as V5 and V6.  ST depressions were present on previous EKG in July 16, 2022. [MK]      ED Course User Index  [JK] Flaco Morris DO  [MK] Guerline Wang PA-C     Disposition   As a result of their workup, the patient will require admission to the hospital.  The patient was informed of her diagnosis.  The patient was given the opportunity to ask questions and I answered them. The patient agreed to be admitted to the hospital.    Procedures   Procedures    This was a shared visit with an ED attending.  The patient was seen and discussed with the ED attending    Guerline Wang PA-C  Emergency Medicine       Guerline Wang PA-C  09/16/24  [id number]

## 2024-09-17 NOTE — CARE PLAN
Problem: Skin  Goal: Decreased wound size/increased tissue granulation at next dressing change  Outcome: Progressing  Goal: Participates in plan/prevention/treatment measures  Outcome: Progressing  Goal: Prevent/manage excess moisture  Outcome: Progressing  Goal: Prevent/minimize sheer/friction injuries  Outcome: Progressing  Flowsheets (Taken 9/17/2024 7601)  Prevent/minimize sheer/friction injuries: HOB 30 degrees or less  Goal: Promote/optimize nutrition  Outcome: Progressing  Goal: Promote skin healing  Outcome: Progressing     Problem: Pain - Adult  Goal: Verbalizes/displays adequate comfort level or baseline comfort level  Outcome: Progressing     Problem: Safety - Adult  Goal: Free from fall injury  Outcome: Progressing     Problem: Discharge Planning  Goal: Discharge to home or other facility with appropriate resources  Outcome: Progressing     Problem: Chronic Conditions and Co-morbidities  Goal: Patient's chronic conditions and co-morbidity symptoms are monitored and maintained or improved  Outcome: Progressing     Problem: Fall/Injury  Goal: Not fall by end of shift  Outcome: Progressing  Goal: Be free from injury by end of the shift  Outcome: Progressing  Goal: Verbalize understanding of personal risk factors for fall in the hospital  Outcome: Progressing  Goal: Verbalize understanding of risk factor reduction measures to prevent injury from fall in the home  Outcome: Progressing  Goal: Use assistive devices by end of the shift  Outcome: Progressing  Goal: Pace activities to prevent fatigue by end of the shift  Outcome: Progressing   The patient's goals for the shift include      The clinical goals for the shift include Pt will remain free from fall/injury

## 2024-09-17 NOTE — PROGRESS NOTES
Referral was made to [facility] per family request. Will follow along and follow up on dc plan as appropriate.     [id number] Update received from Dr. Flannery- family would like a full work up along with speaking with [facility]. The pt was being cared for by two children, rotating between both children's homes. Using a walker at home.

## 2024-09-18 ENCOUNTER — APPOINTMENT (OUTPATIENT)
Dept: RADIOLOGY | Facility: HOSPITAL | Age: 89
DRG: 871 | End: 2024-09-18
Payer: COMMERCIAL

## 2024-09-18 LAB
ALBUMIN SERPL BCP-MCNC: 3.3 G/DL (ref 3.4–5)
ALP SERPL-CCNC: 78 U/L (ref 33–136)
ALT SERPL W P-5'-P-CCNC: 19 U/L (ref 7–45)
ANION GAP SERPL CALC-SCNC: 13 MMOL/L (ref 10–20)
AST SERPL W P-5'-P-CCNC: 28 U/L (ref 9–39)
BILIRUB SERPL-MCNC: 1.2 MG/DL (ref 0–1.2)
BUN SERPL-MCNC: 33 MG/DL (ref 6–23)
CALCIUM SERPL-MCNC: 8.5 MG/DL (ref 8.6–10.3)
CHLORIDE SERPL-SCNC: 99 MMOL/L (ref 98–107)
CO2 SERPL-SCNC: 28 MMOL/L (ref 21–32)
CREAT SERPL-MCNC: 0.93 MG/DL (ref 0.5–1.05)
EGFRCR SERPLBLD CKD-EPI 2021: 59 ML/MIN/1.73M*2
ERYTHROCYTE [DISTWIDTH] IN BLOOD BY AUTOMATED COUNT: 15.7 % (ref 11.5–14.5)
GLUCOSE SERPL-MCNC: 98 MG/DL (ref 74–99)
HCT VFR BLD AUTO: 31.8 % (ref 36–46)
HGB BLD-MCNC: 10.2 G/DL (ref 12–16)
INR PPP: 1.8 (ref 0.9–1.1)
MAGNESIUM SERPL-MCNC: 2.51 MG/DL (ref 1.6–2.4)
MCH RBC QN AUTO: 27.9 PG (ref 26–34)
MCHC RBC AUTO-ENTMCNC: 32.1 G/DL (ref 32–36)
MCV RBC AUTO: 87 FL (ref 80–100)
NRBC BLD-RTO: 0 /100 WBCS (ref 0–0)
PHOSPHATE SERPL-MCNC: 2.4 MG/DL (ref 2.5–4.9)
PLATELET # BLD AUTO: 193 X10*3/UL (ref 150–450)
POTASSIUM SERPL-SCNC: 3 MMOL/L (ref 3.5–5.3)
PROT SERPL-MCNC: 6.3 G/DL (ref 6.4–8.2)
PROTHROMBIN TIME: 20.8 SECONDS (ref 9.8–12.8)
RBC # BLD AUTO: 3.65 X10*6/UL (ref 4–5.2)
SODIUM SERPL-SCNC: 137 MMOL/L (ref 136–145)
WBC # BLD AUTO: 19.5 X10*3/UL (ref 4.4–11.3)

## 2024-09-18 PROCEDURE — 83735 ASSAY OF MAGNESIUM: CPT | Performed by: INTERNAL MEDICINE

## 2024-09-18 PROCEDURE — 74230 X-RAY XM SWLNG FUNCJ C+: CPT | Performed by: RADIOLOGY

## 2024-09-18 PROCEDURE — 1100000001 HC PRIVATE ROOM DAILY

## 2024-09-18 PROCEDURE — 85610 PROTHROMBIN TIME: CPT | Performed by: INTERNAL MEDICINE

## 2024-09-18 PROCEDURE — 97112 NEUROMUSCULAR REEDUCATION: CPT | Mod: GO

## 2024-09-18 PROCEDURE — 2500000002 HC RX 250 W HCPCS SELF ADMINISTERED DRUGS (ALT 637 FOR MEDICARE OP, ALT 636 FOR OP/ED): Performed by: INTERNAL MEDICINE

## 2024-09-18 PROCEDURE — 2500000005 HC RX 250 GENERAL PHARMACY W/O HCPCS: Performed by: INTERNAL MEDICINE

## 2024-09-18 PROCEDURE — 99233 SBSQ HOSP IP/OBS HIGH 50: CPT | Performed by: INTERNAL MEDICINE

## 2024-09-18 PROCEDURE — 85027 COMPLETE CBC AUTOMATED: CPT | Performed by: INTERNAL MEDICINE

## 2024-09-18 PROCEDURE — 84100 ASSAY OF PHOSPHORUS: CPT | Performed by: INTERNAL MEDICINE

## 2024-09-18 PROCEDURE — 97530 THERAPEUTIC ACTIVITIES: CPT | Mod: GP

## 2024-09-18 PROCEDURE — 97161 PT EVAL LOW COMPLEX 20 MIN: CPT | Mod: GP

## 2024-09-18 PROCEDURE — 92611 MOTION FLUOROSCOPY/SWALLOW: CPT | Mod: GN | Performed by: STUDENT IN AN ORGANIZED HEALTH CARE EDUCATION/TRAINING PROGRAM

## 2024-09-18 PROCEDURE — 74230 X-RAY XM SWLNG FUNCJ C+: CPT

## 2024-09-18 PROCEDURE — 97165 OT EVAL LOW COMPLEX 30 MIN: CPT | Mod: GO

## 2024-09-18 PROCEDURE — 80053 COMPREHEN METABOLIC PANEL: CPT | Performed by: INTERNAL MEDICINE

## 2024-09-18 PROCEDURE — 2580000001 HC RX 258 IV SOLUTIONS: Performed by: INTERNAL MEDICINE

## 2024-09-18 PROCEDURE — 2500000001 HC RX 250 WO HCPCS SELF ADMINISTERED DRUGS (ALT 637 FOR MEDICARE OP): Performed by: INTERNAL MEDICINE

## 2024-09-18 PROCEDURE — 36415 COLL VENOUS BLD VENIPUNCTURE: CPT | Performed by: INTERNAL MEDICINE

## 2024-09-18 RX ORDER — SODIUM CHLORIDE AND POTASSIUM CHLORIDE 300; 900 MG/100ML; MG/100ML
75 INJECTION, SOLUTION INTRAVENOUS CONTINUOUS
Status: DISPENSED | OUTPATIENT
Start: 2024-09-18 | End: 2024-09-19

## 2024-09-18 RX ORDER — BENZONATATE 100 MG/1
100 CAPSULE ORAL 3 TIMES DAILY PRN
Status: DISCONTINUED | OUTPATIENT
Start: 2024-09-18 | End: 2024-09-24 | Stop reason: HOSPADM

## 2024-09-18 RX ORDER — GUAIFENESIN/DEXTROMETHORPHAN 100-10MG/5
5 SYRUP ORAL EVERY 4 HOURS PRN
Status: DISCONTINUED | OUTPATIENT
Start: 2024-09-18 | End: 2024-09-24 | Stop reason: HOSPADM

## 2024-09-18 ASSESSMENT — COGNITIVE AND FUNCTIONAL STATUS - GENERAL
TURNING FROM BACK TO SIDE WHILE IN FLAT BAD: A LITTLE
STANDING UP FROM CHAIR USING ARMS: A LITTLE
TOILETING: A LOT
DRESSING REGULAR UPPER BODY CLOTHING: A LOT
DRESSING REGULAR UPPER BODY CLOTHING: A LOT
DRESSING REGULAR LOWER BODY CLOTHING: A LOT
WALKING IN HOSPITAL ROOM: A LITTLE
TOILETING: A LOT
HELP NEEDED FOR BATHING: A LOT
EATING MEALS: A LOT
MOBILITY SCORE: 17
PERSONAL GROOMING: A LOT
STANDING UP FROM CHAIR USING ARMS: A LOT
HELP NEEDED FOR BATHING: A LOT
EATING MEALS: A LITTLE
CLIMB 3 TO 5 STEPS WITH RAILING: A LOT
MOVING TO AND FROM BED TO CHAIR: A LOT
TURNING FROM BACK TO SIDE WHILE IN FLAT BAD: A LOT
MOVING FROM LYING ON BACK TO SITTING ON SIDE OF FLAT BED WITH BEDRAILS: A LOT
DAILY ACTIVITIY SCORE: 12
MOBILITY SCORE: 12
MOVING FROM LYING ON BACK TO SITTING ON SIDE OF FLAT BED WITH BEDRAILS: A LITTLE
PERSONAL GROOMING: A LITTLE
CLIMB 3 TO 5 STEPS WITH RAILING: A LOT
WALKING IN HOSPITAL ROOM: A LOT
DAILY ACTIVITIY SCORE: 14
DRESSING REGULAR LOWER BODY CLOTHING: A LOT
MOVING TO AND FROM BED TO CHAIR: A LITTLE

## 2024-09-18 ASSESSMENT — PAIN SCALES - GENERAL
PAINLEVEL_OUTOF10: 0 - NO PAIN
PAINLEVEL_OUTOF10: 3

## 2024-09-18 ASSESSMENT — PAIN - FUNCTIONAL ASSESSMENT
PAIN_FUNCTIONAL_ASSESSMENT: 0-10

## 2024-09-18 ASSESSMENT — ACTIVITIES OF DAILY LIVING (ADL): BATHING_ASSISTANCE: MAXIMAL

## 2024-09-18 NOTE — PROGRESS NOTES
Physical Therapy    Physical Therapy Evaluation    Patient Name: Shona Everett  MRN: [id number]  Today's Date: 9/18/2024   Time Calculation  Start Time: 1357  Stop Time: 1414  Time Calculation (min): 17 min  3114/3114-A    Assessment/Plan   PT Assessment: Pt demonstrates decreased strength, decreased cognition, decreased endurance, and impaired balance/mobility.  Pt appears below baseline level of function and based on current level of function, pt would benefit from continued skilled therapy while in the hospital to ensure safety, decrease risk of falls, and regain strength/mobility back to baseline.  Once stable enough for discharge, pt would benefit from moderate intensity therapy prior to returning home.     PT Assessment Results: Decreased strength, Decreased range of motion, Decreased endurance, Impaired balance, Decreased mobility, Decreased cognition, Impaired judgement, Decreased safety awareness  Rehab Prognosis: Fair (2/2 cognitive deficits)  Evaluation/Treatment Tolerance: Patient tolerated treatment well  Medical Staff Made Aware: Yes  End of Session Communication: Bedside nurse  End of Session Patient Position: Up in chair, Alarm on  IP OR SWING BED PT PLAN  Inpatient or Swing Bed: Inpatient  PT Plan  Treatment/Interventions: Bed mobility, Transfer training, Gait training, Balance training, Neuromuscular re-education, Strengthening, Endurance training, Therapeutic exercise, Range of motion, Therapeutic activity, Home exercise program  PT Plan: Ongoing PT  PT Frequency: 3 times per week  PT Discharge Recommendations: Moderate intensity level of continued care  Equipment Recommended upon Discharge: Wheeled walker  PT Recommended Transfer Status: Assist x1 (Min A)  PT - OK to Discharge: Yes - To next level of care when cleared by medical team    Subjective     Current Problem:  1. Atrial fibrillation with RVR (Multi)        2. Sepsis, due to unspecified organism, unspecified whether acute organ  dysfunction present (Multi)        3. Pneumonia due to infectious organism, unspecified laterality, unspecified part of lung        4. Elevated brain natriuretic peptide (BNP) level        5. Supratherapeutic INR        6. DALTON (acute kidney injury) (CMS-HCC)        7. Hypokalemia            Past Medical History:  Patient Active Problem List   Diagnosis    2-vessel coronary artery disease    Amaurosis fugax    Angina, class IV (CMS-HCC)    Aortic valve regurgitation, nonrheumatic    Aortic valve stenosis    Asymptomatic stenosis of right carotid artery    Atrophy of vagina    Bilateral carotid bruits    Calcific supraspinatus tendinitis    Closed fracture of right hip (Multi)    Mixed hyperlipidemia    Paroxysmal atrial fibrillation (Multi)    Peripheral vascular disease, unspecified (CMS-HCC)    S/P AVR    S/P CABG (coronary artery bypass graft)    Thyroid nodule    Vitamin D deficiency    Recurrent UTI    Chronic kidney disease, stage 3a (Multi)    Rotator cuff arthropathy of right shoulder    Memory loss    Hypothyroidism due to acquired atrophy of thyroid    Essential hypertension    Hyperglycemia    Debility    Ataxia    Anemia, normocytic normochromic    Cognitive decline    Delirium    Sundowning    Hypomagnesemia    Caregiver stress    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)    Ataxic gait    Age-related osteoporosis without current pathological fracture    Cold intolerance    Interstitial myositis of multiple sites    Iron deficiency anemia    Trigger middle finger of left hand    BMI 31.0-31.9,adult    Bilateral edema of lower extremity    Pharyngeal dysphagia    Pneumonia    Acute hypoxemic respiratory failure (Multi)    Leukocytosis    Atrial fibrillation with RVR (Multi)    Sepsis with acute organ dysfunction (Multi)    Bandemia    Coagulopathy (Multi)       General Visit Information:  Per EMR: pt presenting with initial concern for increasing weakness with  difficulty ambulating.  Patient has a well-documented history of dementia.  History of mechanical heart valve on Coumadin, amaurosis fugax, paroxysmal atrial fibrillation, CABG, CKD.  At baseline, she is described as ANO x 1.  She is a confirmed DNR CC.  On arrival, patient was noted to be in atrial fibrillation with rapid ventricular response.  Heart rates were initially in the 130s to 140s and she was treated with IV Lopressor.  Heart rates have improved, currently in the low 100s.  Additional workup revealed a dense consolidation at the right lung base consistent with pneumonia.  Patient with documented anaphylaxis to penicillins.  Was started on combination vancomycin, cefepime, and Flagyl.  In discussion with patient's family, ED reports family is most interested in pursuing palliative measures and possible hospice consultation.  They are comfortable with medications for rate control, IV fluids, and treatments generally thought to be minimally invasive however would not want aggressive care.  Admitted for ongoing management.     On arrival, pt sitting EOB.  Pt in no apparent distress and agreeable to therapy.    General  Reason for Referral: impaired mobility  Referred By: Nazario Flannery  Past Medical History Relevant to Rehab: dementia, Afib, PVD, CABG  Co-Treatment: OT  Prior to Session Communication: Bedside nurse  Patient Position Received: Alarm on (sitting EOB)    Home Living/PLOF:  Pt unable to provide much history at bedside.  Per EMR, pt's children alternate who stays with her.  Pt states she uses FWW for mobility.     Precautions:  Precautions  Medical Precautions: Fall precautions, Oxygen therapy device and L/min (2LNC)    Vital Signs:  Vital Signs  SpO2: 96 % (on arrival pt's nasal cannula was at forehead; replaced in nares (2L))  Objective     Pain:  Pain Assessment  Pain Assessment: 0-10  0-10 (Numeric) Pain Score: 0 - No pain    Cognition:  Cognition  Overall Cognitive Status:  "Impaired  Orientation Level: Disoriented to situation, Disoriented to time, Disoriented to place (pt states \"[place]\", \"[place]\", and \"[place]\" when asked where she is)  Memory:  (impaired)  Safety/Judgement:  (impaired)  Insight: Severe    General Assessments:      Activity Tolerance  Endurance: Tolerates 10 - 20 min exercise with multiple rests    Postural Control  Posture Comment: kyphotic; decreased ability to extend cervical spine in sitting and standing  Static Sitting Balance  Static Sitting-Comment/Number of Minutes: fair plus  Dynamic Sitting Balance  Dynamic Sitting-Comments: fair  Static Standing Balance  Static Standing-Comment/Number of Minutes: fair  Dynamic Standing Balance  Dynamic Standing-Comments: fair    Extremity/Trunk Assessments:  BLE strength: at least 3/5    Functional Mobility:  Bed mobility  NT 2/2 pt sitting EOB on arrival     Transfers  Sit to stand: Min A x2; required 2 attempts to clear buttocks from EOB  Stand to sit: Min A x2; Vcs for hand placement and safety     Ambulation/Stairs  Pt ambulated 3 ft from bed to chair with Min A x2 and FWW; Vcs for guidance and safety; WBOS with forward flexed posture     Outcome Measures:  [facility] Basic Mobility  Turning from your back to your side while in a flat bed without using bedrails: A little  Moving from lying on your back to sitting on the side of a flat bed without using bedrails: A little  Moving to and from bed to chair (including a wheelchair): A little  Standing up from a chair using your arms (e.g. wheelchair or bedside chair): A little  To walk in hospital room: A little  Climbing 3-5 steps with railing: A lot  Basic Mobility - Total Score: 17    Goals:  Encounter Problems       Encounter Problems (Active)       PT Problem       Pt will be able to perform all bed mobility tasks with SBA.  (Progressing)       Start:  09/18/24    Expected End:  10/02/24            Pt will perform all transfers with SBA and FWW with proper safety " mechanics.   (Progressing)       Start:  09/18/24    Expected End:  10/02/24            Pt will ambulate 75 ft with SBA using FWW for improved functional independence.  (Progressing)       Start:  09/18/24    Expected End:  10/02/24            Pt will be able to ambulate at least 75 ft with < or equal to 1 rest break while maintaining SpO2 > 90%.  (Progressing)       Start:  09/18/24    Expected End:  10/02/24                 Education Documentation  Body Mechanics, taught by Virgen Martinez, PT at 9/18/2024  2:32 PM.  Learner: Patient  Readiness: Acceptance  Method: Explanation  Response: Needs Reinforcement, No Evidence of Learning    Home Exercise Program, taught by Virgen Martinez, PT at 9/18/2024  2:32 PM.  Learner: Patient  Readiness: Acceptance  Method: Explanation  Response: Needs Reinforcement, No Evidence of Learning    Mobility Training, taught by Virgen Martinez, PT at 9/18/2024  2:32 PM.  Learner: Patient  Readiness: Acceptance  Method: Explanation  Response: Needs Reinforcement, No Evidence of Learning    Education Comments  No comments found.

## 2024-09-18 NOTE — PROGRESS NOTES
Attempt to meet with patient/family at bedside to discuss d/c planning. Patient currently off the floor for testing. Will attempt again at a later time.     1520: Spoke to patient's daughter, Michaela, on the phone to discuss d/c planning. She stated that her brother Delfino is the one to call for decisions. Attempt to call Delfino, no answer. Left message to call back regarding d/c planning. CT team to follow.

## 2024-09-18 NOTE — PROGRESS NOTES
Occupational Therapy    Occupational Therapy    Evaluation    Patient Name: Shona Everett  MRN: [id number]  Today's Date: 9/18/2024  Time Calculation  Start Time: 1357  Stop Time: 1414  Time Calculation (min): 17 min  3114/3114-A    Assessment  IP OT Assessment  OT Assessment: Pt initially lethargic and very confused. Pt did have an increase in alertness, but remained confused. Pt requires assist with all ADL's and mobility. Recommend SNF to increase safety and independence with ADL's.  Prognosis: Good  End of Session Communication: Bedside nurse  End of Session Patient Position: Up in chair, Alarm on    Plan:  Treatment Interventions: ADL retraining, Functional transfer training, UE strengthening/ROM, Endurance training, Neuromuscular reeducation  OT Frequency: 3 times per week  OT Discharge Recommendations: Moderate intensity level of continued care (SNF)  Equipment Recommended upon Discharge: Wheeled walker  OT Recommended Transfer Status: Minimal assist, Assist of 2  OT - OK to Discharge: Yes (to next level of care)    Subjective     Current Problem:  1. Atrial fibrillation with RVR (Multi)        2. Sepsis, due to unspecified organism, unspecified whether acute organ dysfunction present (Multi)        3. Pneumonia due to infectious organism, unspecified laterality, unspecified part of lung        4. Elevated brain natriuretic peptide (BNP) level        5. Supratherapeutic INR        6. DALTON (acute kidney injury) (CMS-HCC)        7. Hypokalemia            General:  General  Reason for Referral: ADL's; Safety Assessment  Referred By: Nazario Flannery  Past Medical History Relevant to Rehab: dementia, Afib, PVD, CABG  Co-Treatment: PT  Co-Treatment Reason: safety  Prior to Session Communication: Bedside nurse  Patient Position Received: Alarm on (sitting EOB)    General Visit Information:  Per EMR: pt presenting with initial concern for increasing weakness with difficulty ambulating.  Patient has a  "well-documented history of dementia.  History of mechanical heart valve on Coumadin, amaurosis fugax, paroxysmal atrial fibrillation, CABG, CKD.  At baseline, she is described as ANO x 1.  She is a confirmed DNR CC.  On arrival, patient was noted to be in atrial fibrillation with rapid ventricular response.  Heart rates were initially in the 130s to 140s and she was treated with IV Lopressor.  Heart rates have improved, currently in the low 100s.  Additional workup revealed a dense consolidation at the right lung base consistent with pneumonia.  Patient with documented anaphylaxis to penicillins.  Was started on combination vancomycin, cefepime, and Flagyl.  In discussion with patient's family, ED reports family is most interested in pursuing palliative measures and possible hospice consultation.  They are comfortable with medications for rate control, IV fluids, and treatments generally thought to be minimally invasive however would not want aggressive care.  Admitted for ongoing management.      On arrival, pt sitting EOB.  Pt in no apparent distress and agreeable to therapy.    Precautions:  Medical Precautions: Fall precautions, Oxygen therapy device and L/min (2LNC)    Vital Signs:  SpO2: 96 % (on arrival pt's nasal cannula was at forehead; replaced in nares (2L))    Pain:  Pain Assessment  Pain Assessment: 0-10  0-10 (Numeric) Pain Score: 0 - No pain    Objective     Cognition:  Overall Cognitive Status: Impaired (Pt confused throughout eval. Pt [place] an [place], then bus depot and [facility] office. Then she talked about \"fighting the enemies\")  Orientation Level: Disoriented to situation, Disoriented to time, Disoriented to place (pt states \"[place]\", \"airport\", and \"bus depot\" when asked where she is)  Memory: Exceptions to WFL (pt with poor memory)  Safety/Judgement: Exceptions to WFL (Pt with impaired judgement and poor safety)     Home Living/PLOF:  Pt unable to provide much history at bedside.  " Per EMR, pt's children alternate who stays with her.  Pt states she uses FWW for mobility.      ADL:  Eating Assistance: Minimal  Grooming Assistance: Minimal  Bathing Assistance: Maximal  UE Dressing Assistance: Moderate  LE Dressing Assistance: Maximal  Toileting Assistance with Device: Maximal    Activity Tolerance:  Endurance: Tolerates 10 - 20 min exercise with multiple rests    Functional Mobility:  Bed mobility  NT 2/2 pt sitting EOB on arrival      Transfers  Sit to stand: Min A x2; required 2 attempts to clear buttocks from EOB  Stand to sit: Min A x2; Vcs for hand placement and safety      Ambulation/Stairs  Pt ambulated 3 ft from bed to chair with Min A x2 and FWW; Vcs for guidance and safety; WBOS with forward flexed posture     Sitting Balance:  Static Sitting Balance  Static Sitting-Comment/Number of Minutes: fair+  Dynamic Sitting Balance  Dynamic Sitting-Comments: fair    Standing Balance:  Static Standing Balance  Static Standing-Comment/Number of Minutes: fair  Dynamic Standing Balance  Dynamic Standing-Comments: fair-    Strength:  Strength Comments: B UE's WFL    Extremities: RUE   RUE : Within Functional Limits and LUE   LUE: Within Functional Limits    Outcome Measures: [facility] Daily Activity  Putting on and taking off regular lower body clothing: A lot  Bathing (including washing, rinsing, drying): A lot  Putting on and taking off regular upper body clothing: A lot  Toileting, which includes using toilet, bedpan or urinal: A lot  Taking care of personal grooming such as brushing teeth: A lot  Eating Meals: A lot  Daily Activity - Total Score: 12    EDUCATION:  Education  Individual(s) Educated: Patient  Education Provided:  (safety)  Education Documentation  Body Mechanics, taught by Amee Sharma OT at 9/18/2024  7:03 PM.  Learner: Patient  Readiness: Acceptance  Method: Explanation  Response: Verbalizes Understanding    Precautions, taught by Amee Sharma OT at 9/18/2024  7:03 PM.  Learner:  Patient  Readiness: Acceptance  Method: Explanation  Response: Verbalizes Understanding    Education Comments  No comments found.        Goals:   Encounter Problems       Encounter Problems (Active)       OT Goals       OT Goal 1 (Progressing)       Start:  09/18/24    Expected End:  10/02/24       Pt will complete all bed mobility with supervision safely            OT Goal 2 (Progressing)       Start:  09/18/24    Expected End:  10/02/24       Pt with complete all transfers safely with SBA           OT Goal 3 (Progressing)       Start:  09/18/24    Expected End:  10/02/24       Pt will complete ADL's and mobility with good sit balance and fair+ stand balance            OT Goal 4 (Progressing)       Start:  09/18/24    Expected End:  10/02/24       Pt will complete LB dressing with CGA using adaptive device as needed           OT Goal 5 (Progressing)       Start:  09/18/24    Expected End:  10/02/24       Pt will complete grooming ADL's with SBA and fair+ stand balance                  Treatment: Pt was sitting unsupported on EOB upon therapy entering room. Pt with bed alarm on and maintaining fair balance. Pt very lethargic, and difficult to arouse. Pt eventually with increased alertness, but remained confused. Pt completed all transfers with Min A of 2, and cuing for safety. Pt ambulated a few feet bed to chair using wheeled walker with close CGA. Pt remained in chair with chair alarm on and call light within reach.

## 2024-09-18 NOTE — PROCEDURES
Inpatient Modified Barium Swallow Study     Patient Name: Shona Everett  MRN: [id number]  : 1935  Today's Date: 24  Time Calculation  Start Time: 1141  Stop Time: 1153  Time Calculation (min): 12 min       Modified Barium Swallow Study completed after informed verbal consent. The study was completed per protocol with various liquid and solid barium consistencies. The anatomic structures and function of the oropharynx, larynx, hypopharynx and cervical esophagus were evaluated.     Marina Last MA, CCC-SLP  Phone/Pager: Epic/Haiku secure chat    SPEECH FINDINGS:   Reason for referral: Assess swallow function  Patient hx: 89 y.o. female presenting with initial concern for increasing weakness with difficulty ambulating. Patient has a well-documented history of dementia. History of mechanical heart valve on Coumadin, amaurosis fugax, paroxysmal atrial fibrillation, CABG, CKD. At baseline, she is described as ANO x 1.   Respiratory status: nasal cannula, 3L  Current diet: regular/thin    FINAL SPEECH RECOMMENDATIONS  DIET: Regular/thin  SWALLOW PRECAUTIONS:   -Sit upright 90 degrees for all PO  -Remain upright 20-30 minutes after meals  -Feed/eat at a slow rate  -Small bite/sip  -Alternate liquids/solids  -Medications whole with liquid or puree carrier if large; may consider crushing if large and appropriate to do so  -Diligent and regular oral Care     Plan:  Treatment/Interventions: Diet tolerance  SLP Plan: Skilled SLP warranted  SLP Frequency: 1 follow up session  Duration: 1 week    Discussed POC: Patient  Discussed Risks/Benefits: Yes  Patient/Caregiver Agreeable: Yes    Pain Scale: 0/10, 0-no pain     GOALS: Established 24  Patient/Caregiver will demonstrate knowledge of taught compensatory strategies and dietary consistency recommendations to optimize functional swallow function without overt clinical signs and symptoms of aspiration or dysphagia    Long term goal: Maintain adequate  nutrition and hydration with the least restrictive oral diet with no overt s/s of aspiration or pulmonary compromise.    Education provided: ST provided education to Patient regarding MBSS impressions, recommendations and POC at this time. Verbal understanding and agreement given on all accounts.     Treatment Provided today: No    Additional consult suggested: No    Repeat study/ dc plan: No    Mechanics of the swallow summary:  ORAL PHASE:  Lip Closure - No labial escape/anterior loss of bolus   Tongue Control During Bolus Hold - Escape to lateral buccal cavity and/or floor of mouth   Bolus prep/mastication - Slow prolonged mastication with complete re-collection necessary   Bolus transport/lingual motion - Repetitive/disorganized tongue motion (tongue pumping)   Oral residue - Residue collection on oral structure     PHARYNGEAL PHASE:  Initiation of pharyngeal swallow - Bolus head at pit of pyriforms   Soft palate elevation - No bolus between soft palate/pharyngeal wall   Laryngeal elevation - Complete superior movement of thyroid cartilage with contact of arytenoids to epiglottic petiole   Anterior hyoid excursion - Complete anterior movement   Epiglottic movement - Complete inversion    Laryngeal vestibule closure - Complete - no air/contrast in laryngeal vestibule   Pharyngeal stripping wave - Complete  Pharyngeal contraction (A/P view) - Not tested       Pharyngoesophageal segment opening - Complete distension and complete duration/no obstruction of flow of bolus   Tongue base retraction - No bolus between tongue base and posterior pharyngeal wall   Pharyngeal residue - Complete pharyngeal clearance     ESOPHAGEAL PHASE:  Esophageal clearance - Not evaluated     SLP Impressions with severity rating:   Patient presents with mild oral dysphagia and pharyngeal swallow grossly within functional limits upon completion of modified barium swallow study. Swallowing impairments, identified above, were mild and without  significant impact on swallowing safety/efficiency. Patient did not demonstrate penetration of any consistency, and no aspiration was visualized during study. Patient demonstrated mild oral residue that was reduced with cued or spontaneous repeat swallows.    Per the results of this assessment, patient is appropriate to continue baseline diet, with standard swallow precautions as noted above. Speech therapy to follow for diet tolerance due to observed oral dysphagia.     Dysphagia Outcome Severity Scale (YOHANA): 6 (Within functional limits)    Rosenbek's Penetration Aspiration Scale  Thin Liquids: 1. NO ASPIRATION & NO PENETRATION - no aspiration, contrast does not enter airway  [place] Thick Liquids: 1. NO ASPIRATION & NO PENETRATION - no aspiration, contrast does not enter airway  Puree: 1. NO ASPIRATION & NO PENETRATION - no aspiration, contrast does not enter airway  Solids: 1. NO ASPIRATION & NO PENETRATION - no aspiration, contrast does not enter airway

## 2024-09-18 NOTE — PROGRESS NOTES
"Shona Everett is a 89 y.o. female on day 2 of admission presenting with Pneumonia.      Subjective   Patient asleep on my arrival. Easily awakened. Has no complaints but stated \"oh you again\" when she woke up and oriented       Objective     Last Recorded Vitals  /58 (BP Location: Left arm, Patient Position: Lying)   Pulse 110   Temp 36.2 °C (97.2 °F) (Temporal)   Resp 18   Wt 74 kg (163 lb 2.3 oz)   SpO2 94%   Intake/Output last 3 Shifts:    Intake/Output Summary (Last 24 hours) at 9/18/2024 1138  Last data filed at 9/18/2024 0300  Gross per 24 hour   Intake 240 ml   Output 600 ml   Net -360 ml       Admission Weight  Weight: 74.8 kg (165 lb) (09/16/24 1904)    Daily Weight  09/16/24 : 74 kg (163 lb 2.3 oz)    Image Results  ECG 12 lead  Atrial fibrillation with rapid ventricular response  Right axis deviation  Septal infarct (cited on or before 16-SEP-2024)  Marked ST abnormality, possible inferior subendocardial injury  Abnormal ECG  When compared with ECG of 03-JAN-2023 13:41,  Previous ECG has undetermined rhythm, needs review  Serial changes of evolving Septal infarct Present      Physical Exam  Constitutional:       General: She is not in acute distress.     Appearance: Normal appearance.   HENT:      Head: Normocephalic and atraumatic.      Mouth/Throat:      Mouth: Mucous membranes are moist.   Eyes:      Extraocular Movements: Extraocular movements intact.      Conjunctiva/sclera: Conjunctivae normal.      Pupils: Pupils are equal, round, and reactive to light.   Cardiovascular:      Rate and Rhythm: Normal rate and regular rhythm.      Heart sounds: Normal heart sounds.   Pulmonary:      Effort: Pulmonary effort is normal.      Breath sounds: Normal breath sounds. No wheezing, rhonchi or rales.   Abdominal:      General: Abdomen is flat. Bowel sounds are normal.      Palpations: Abdomen is soft.   Musculoskeletal:         General: No swelling or tenderness. Normal range of motion.      " Cervical back: Normal range of motion and neck supple.   Skin:     General: Skin is warm and dry.      Findings: No rash.   Neurological:      General: No focal deficit present.      Mental Status: She is alert. Mental status is at baseline. She is disoriented.      Cranial Nerves: No cranial nerve deficit.      Sensory: No sensory deficit.   Psychiatric:         Mood and Affect: Mood normal.      Comments: Poor to no insight currently       Relevant Results  Scheduled medications  levoFLOXacin, 750 mg, intravenous, q48h  levothyroxine, 88 mcg, oral, Daily  metoprolol succinate XL, 50 mg, oral, BID  oxygen, , inhalation, Continuous - Inhalation  sertraline, 25 mg, oral, Daily      Continuous medications  potassium chloride in 0.9%NaCl, 75 mL/hr      PRN medications  PRN medications: acetaminophen, melatonin, [DISCONTINUED] ondansetron ODT **OR** ondansetron    Results for orders placed or performed during the hospital encounter of 09/16/24 (from the past 24 hour(s))   Comprehensive Metabolic Panel   Result Value Ref Range    Glucose 105 (H) 74 - 99 mg/dL    Sodium 138 136 - 145 mmol/L    Potassium 2.7 (LL) 3.5 - 5.3 mmol/L    Chloride 98 98 - 107 mmol/L    Bicarbonate 30 21 - 32 mmol/L    Anion Gap 13 10 - 20 mmol/L    Urea Nitrogen 32 (H) 6 - 23 mg/dL    Creatinine 1.11 (H) 0.50 - 1.05 mg/dL    eGFR 48 (L) >60 mL/min/1.73m*2    Calcium 8.3 (L) 8.6 - 10.3 mg/dL    Albumin 3.2 (L) 3.4 - 5.0 g/dL    Alkaline Phosphatase 67 33 - 136 U/L    Total Protein 6.2 (L) 6.4 - 8.2 g/dL    AST 25 9 - 39 U/L    Bilirubin, Total 1.1 0.0 - 1.2 mg/dL    ALT 17 7 - 45 U/L   Magnesium   Result Value Ref Range    Magnesium 1.50 (L) 1.60 - 2.40 mg/dL   Phosphorus   Result Value Ref Range    Phosphorus 2.3 (L) 2.5 - 4.9 mg/dL   Protime-INR   Result Value Ref Range    Protime 96.0 (HH) 9.8 - 12.8 seconds    INR 8.3 (HH) 0.9 - 1.1   CBC and Auto Differential   Result Value Ref Range    WBC 23.5 (H) 4.4 - 11.3 x10*3/uL    nRBC 0.0 0.0 - 0.0  /100 WBCs    RBC 3.51 (L) 4.00 - 5.20 x10*6/uL    Hemoglobin 10.1 (L) 12.0 - 16.0 g/dL    Hematocrit 30.3 (L) 36.0 - 46.0 %    MCV 86 80 - 100 fL    MCH 28.8 26.0 - 34.0 pg    MCHC 33.3 32.0 - 36.0 g/dL    RDW 15.6 (H) 11.5 - 14.5 %    Platelets 191 150 - 450 x10*3/uL    Immature Granulocytes %, Automated 1.5 (H) 0.0 - 0.9 %    Immature Granulocytes Absolute, Automated 0.36 0.00 - 0.50 x10*3/uL   Manual Differential   Result Value Ref Range    Neutrophils %, Manual 84.0 40.0 - 80.0 %    Bands %, Manual 5.0 0.0 - 5.0 %    Lymphocytes %, Manual 10.0 13.0 - 44.0 %    Monocytes %, Manual 1.0 2.0 - 10.0 %    Eosinophils %, Manual 0.0 0.0 - 6.0 %    Basophils %, Manual 0.0 0.0 - 2.0 %    Seg Neutrophils Absolute, Manual 19.74 (H) 1.60 - 5.00 x10*3/uL    Bands Absolute, Manual 1.18 (H) 0.00 - 0.50 x10*3/uL    Lymphocytes Absolute, Manual 2.35 0.80 - 3.00 x10*3/uL    Monocytes Absolute, Manual 0.24 0.05 - 0.80 x10*3/uL    Eosinophils Absolute, Manual 0.00 0.00 - 0.40 x10*3/uL    Basophils Absolute, Manual 0.00 0.00 - 0.10 x10*3/uL    Total Cells Counted 100     Neutrophils Absolute, Manual 20.92 (H) 1.60 - 5.50 x10*3/uL    RBC Morphology See Below     Hypochromia Mild     Ovalocytes Few     Dohle Bodies Present     Toxic Granulation Present    Protime-INR   Result Value Ref Range    Protime 20.8 (H) 9.8 - 12.8 seconds    INR 1.8 (H) 0.9 - 1.1   CBC   Result Value Ref Range    WBC 19.5 (H) 4.4 - 11.3 x10*3/uL    nRBC 0.0 0.0 - 0.0 /100 WBCs    RBC 3.65 (L) 4.00 - 5.20 x10*6/uL    Hemoglobin 10.2 (L) 12.0 - 16.0 g/dL    Hematocrit 31.8 (L) 36.0 - 46.0 %    MCV 87 80 - 100 fL    MCH 27.9 26.0 - 34.0 pg    MCHC 32.1 32.0 - 36.0 g/dL    RDW 15.7 (H) 11.5 - 14.5 %    Platelets 193 150 - 450 x10*3/uL   Comprehensive Metabolic Panel   Result Value Ref Range    Glucose 98 74 - 99 mg/dL    Sodium 137 136 - 145 mmol/L    Potassium 3.0 (L) 3.5 - 5.3 mmol/L    Chloride 99 98 - 107 mmol/L    Bicarbonate 28 21 - 32 mmol/L    Anion Gap 13  10 - 20 mmol/L    Urea Nitrogen 33 (H) 6 - 23 mg/dL    Creatinine 0.93 0.50 - 1.05 mg/dL    eGFR 59 (L) >60 mL/min/1.73m*2    Calcium 8.5 (L) 8.6 - 10.3 mg/dL    Albumin 3.3 (L) 3.4 - 5.0 g/dL    Alkaline Phosphatase 78 33 - 136 U/L    Total Protein 6.3 (L) 6.4 - 8.2 g/dL    AST 28 9 - 39 U/L    Bilirubin, Total 1.2 0.0 - 1.2 mg/dL    ALT 19 7 - 45 U/L   Magnesium   Result Value Ref Range    Magnesium 2.51 (H) 1.60 - 2.40 mg/dL   Phosphorus   Result Value Ref Range    Phosphorus 2.4 (L) 2.5 - 4.9 mg/dL       ECG 12 lead    Result Date: 9/17/2024  Atrial fibrillation with rapid ventricular response Right axis deviation Septal infarct (cited on or before 16-SEP-2024) Marked ST abnormality, possible inferior subendocardial injury Abnormal ECG When compared with ECG of 03-JAN-2023 13:41, Previous ECG has undetermined rhythm, needs review Serial changes of evolving Septal infarct Present    XR chest 1 view    Result Date: 9/16/2024  Interpreted By:  Olman Bowie, STUDY: XR CHEST 1 VIEW;  9/16/2024 9:00 pm   INDICATION: Signs/Symptoms:heart failure vs pneumonia.     COMPARISON: 01/03/2023   ACCESSION NUMBER(S): [id number]   ORDERING CLINICIAN: JOSE ANGEL COATS   FINDINGS:     The heart is enlarged. Mild vascular congestion and peribronchovascular interstitial thickening is noted. Median sternotomy noted. Atherosclerotic aorta. New area of dense consolidation at the right peripheral lung base, likely right middle lobe.         Dense consolidation at the right lung base consistent with pneumonia.   Cardiomegaly with baseline bronchovascular interstitial prominence that likely relates to a degree of interstitial edema.   MACRO: None.   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)    Leukocytosis    Sepsis with acute organ dysfunction (Multi)    Bandemia    Coagulopathy (Multi)      -patient with PNA on CXR, especially RLL; may be some concern for aspiration PNA  -patient with clear evidence of sepsis given PNA + leukocytosis, bandemia, likely DALTON, and lactic acidosis   -continue Levaquin  -given patient's advanced age and advanced dementia, pall care and hospice consulted; for now patient to be enrolled in navigator program; appreciate co-management  -given Vit K for INR > 8; back to 1.8 now; will discuss with family stopping coumadin  -continue levaquin; follow up blood cultures (NGTD x 1 day)  -continue IVF; electrolytes repleted as appropriate  -PT/OT/SLP consulted; appreciate evaluations; plan for MBSS  -Home medications resumed as appropriate    Code: DNR comfort care arrest, DNI, no ICU  DVT prophylaxis: Not indicated  GI prophylaxis: Not indicated  Diet: regular, thin    Dispo: possible ok for DC to SNF tomorrow if continues to improve clinically and biochemically.    Nazario Flannery MD

## 2024-09-18 NOTE — CARE PLAN
The patient's goals for the shift include      The clinical goals for the shift include Pt will remain hemodynamically stable all shift    Problem: Skin  Goal: Decreased wound size/increased tissue granulation at next dressing change  Outcome: Progressing  Goal: Participates in plan/prevention/treatment measures  Outcome: Progressing  Goal: Prevent/manage excess moisture  Outcome: Progressing  Goal: Prevent/minimize sheer/friction injuries  9/18/2024 1828 by Merry Alfaro RN  Outcome: Progressing  9/18/2024 0757 by Merry Alfaro RN  Flowsheets (Taken 9/18/2024 0757)  Prevent/minimize sheer/friction injuries:   Use pull sheet   Increase activity/out of bed for meals   HOB 30 degrees or less  Goal: Promote/optimize nutrition  Outcome: Progressing     Problem: Fall/Injury  Goal: Verbalize understanding of personal risk factors for fall in the hospital  Outcome: Progressing  Goal: Verbalize understanding of risk factor reduction measures to prevent injury from fall in the home  Outcome: Progressing     Problem: Infection related to problem list condition  Goal: Infection will resolve through treatment  Outcome: Progressing     Problem: Safety - Adult  Goal: Free from fall injury  Outcome: Progressing

## 2024-09-18 NOTE — CARE PLAN
Over the shift, the patient did not make progress toward the following goals.   Problem: Skin  Goal: Participates in plan/prevention/treatment measures  Outcome: Progressing  Goal: Prevent/manage excess moisture  Outcome: Progressing  Goal: Prevent/minimize sheer/friction injuries  Outcome: Progressing  Goal: Promote/optimize nutrition  Outcome: Progressing     The patient's goals for the shift include  to get some sleep.     The clinical goals for the shift include pt will remain free from fall and injury throughout the shift. Pt met goal and safety was maintained throughout shift. Pt is stable with no acute changes at this time.

## 2024-09-19 ENCOUNTER — APPOINTMENT (OUTPATIENT)
Dept: CARDIOLOGY | Facility: HOSPITAL | Age: 89
DRG: 871 | End: 2024-09-19
Payer: COMMERCIAL

## 2024-09-19 LAB
ANION GAP SERPL CALC-SCNC: 14 MMOL/L (ref 10–20)
BUN SERPL-MCNC: 30 MG/DL (ref 6–23)
CALCIUM SERPL-MCNC: 8.1 MG/DL (ref 8.6–10.3)
CHLORIDE SERPL-SCNC: 103 MMOL/L (ref 98–107)
CO2 SERPL-SCNC: 21 MMOL/L (ref 21–32)
CREAT SERPL-MCNC: 0.82 MG/DL (ref 0.5–1.05)
EGFRCR SERPLBLD CKD-EPI 2021: 69 ML/MIN/1.73M*2
ERYTHROCYTE [DISTWIDTH] IN BLOOD BY AUTOMATED COUNT: 15.9 % (ref 11.5–14.5)
GLUCOSE SERPL-MCNC: 84 MG/DL (ref 74–99)
HCT VFR BLD AUTO: 33.1 % (ref 36–46)
HGB BLD-MCNC: 10.4 G/DL (ref 12–16)
MAGNESIUM SERPL-MCNC: 2.46 MG/DL (ref 1.6–2.4)
MCH RBC QN AUTO: 28.2 PG (ref 26–34)
MCHC RBC AUTO-ENTMCNC: 31.4 G/DL (ref 32–36)
MCV RBC AUTO: 90 FL (ref 80–100)
NRBC BLD-RTO: 0 /100 WBCS (ref 0–0)
PHOSPHATE SERPL-MCNC: 2.1 MG/DL (ref 2.5–4.9)
PLATELET # BLD AUTO: 182 X10*3/UL (ref 150–450)
POTASSIUM SERPL-SCNC: 3.6 MMOL/L (ref 3.5–5.3)
RBC # BLD AUTO: 3.69 X10*6/UL (ref 4–5.2)
SODIUM SERPL-SCNC: 134 MMOL/L (ref 136–145)
WBC # BLD AUTO: 15.2 X10*3/UL (ref 4.4–11.3)

## 2024-09-19 PROCEDURE — 99233 SBSQ HOSP IP/OBS HIGH 50: CPT | Performed by: INTERNAL MEDICINE

## 2024-09-19 PROCEDURE — 84100 ASSAY OF PHOSPHORUS: CPT | Performed by: INTERNAL MEDICINE

## 2024-09-19 PROCEDURE — 2500000005 HC RX 250 GENERAL PHARMACY W/O HCPCS: Performed by: INTERNAL MEDICINE

## 2024-09-19 PROCEDURE — 93005 ELECTROCARDIOGRAM TRACING: CPT

## 2024-09-19 PROCEDURE — 2500000004 HC RX 250 GENERAL PHARMACY W/ HCPCS (ALT 636 FOR OP/ED): Performed by: INTERNAL MEDICINE

## 2024-09-19 PROCEDURE — 2500000001 HC RX 250 WO HCPCS SELF ADMINISTERED DRUGS (ALT 637 FOR MEDICARE OP): Performed by: INTERNAL MEDICINE

## 2024-09-19 PROCEDURE — 97110 THERAPEUTIC EXERCISES: CPT | Mod: GP,CQ

## 2024-09-19 PROCEDURE — 92526 ORAL FUNCTION THERAPY: CPT | Mod: GN | Performed by: STUDENT IN AN ORGANIZED HEALTH CARE EDUCATION/TRAINING PROGRAM

## 2024-09-19 PROCEDURE — 97530 THERAPEUTIC ACTIVITIES: CPT | Mod: GP,CQ

## 2024-09-19 PROCEDURE — 1200000002 HC GENERAL ROOM WITH TELEMETRY DAILY

## 2024-09-19 PROCEDURE — 97116 GAIT TRAINING THERAPY: CPT | Mod: GP,CQ

## 2024-09-19 PROCEDURE — 2500000002 HC RX 250 W HCPCS SELF ADMINISTERED DRUGS (ALT 637 FOR MEDICARE OP, ALT 636 FOR OP/ED): Performed by: INTERNAL MEDICINE

## 2024-09-19 PROCEDURE — 36415 COLL VENOUS BLD VENIPUNCTURE: CPT | Performed by: INTERNAL MEDICINE

## 2024-09-19 PROCEDURE — 80048 BASIC METABOLIC PNL TOTAL CA: CPT | Performed by: INTERNAL MEDICINE

## 2024-09-19 PROCEDURE — 85027 COMPLETE CBC AUTOMATED: CPT | Performed by: INTERNAL MEDICINE

## 2024-09-19 PROCEDURE — 83735 ASSAY OF MAGNESIUM: CPT | Performed by: INTERNAL MEDICINE

## 2024-09-19 PROCEDURE — 97535 SELF CARE MNGMENT TRAINING: CPT | Mod: GO,CO

## 2024-09-19 RX ORDER — METOPROLOL TARTRATE 1 MG/ML
5 INJECTION, SOLUTION INTRAVENOUS ONCE
Status: DISCONTINUED | OUTPATIENT
Start: 2024-09-19 | End: 2024-09-19

## 2024-09-19 RX ORDER — DIVALPROEX SODIUM 125 MG/1
125 CAPSULE, COATED PELLETS ORAL EVERY 12 HOURS SCHEDULED
Status: DISCONTINUED | OUTPATIENT
Start: 2024-09-19 | End: 2024-09-24 | Stop reason: HOSPADM

## 2024-09-19 RX ORDER — LEVOFLOXACIN 750 MG/1
750 TABLET ORAL
Status: COMPLETED | OUTPATIENT
Start: 2024-09-19 | End: 2024-09-21

## 2024-09-19 RX ORDER — METOPROLOL TARTRATE 25 MG/1
25 TABLET, FILM COATED ORAL ONCE
Status: COMPLETED | OUTPATIENT
Start: 2024-09-19 | End: 2024-09-19

## 2024-09-19 ASSESSMENT — COGNITIVE AND FUNCTIONAL STATUS - GENERAL
WALKING IN HOSPITAL ROOM: A LOT
WALKING IN HOSPITAL ROOM: A LITTLE
PERSONAL GROOMING: A LITTLE
PERSONAL GROOMING: A LITTLE
MOVING FROM LYING ON BACK TO SITTING ON SIDE OF FLAT BED WITH BEDRAILS: A LITTLE
EATING MEALS: A LITTLE
MOVING FROM LYING ON BACK TO SITTING ON SIDE OF FLAT BED WITH BEDRAILS: A LOT
EATING MEALS: A LITTLE
DRESSING REGULAR LOWER BODY CLOTHING: A LOT
DRESSING REGULAR LOWER BODY CLOTHING: A LOT
TURNING FROM BACK TO SIDE WHILE IN FLAT BAD: A LITTLE
DRESSING REGULAR UPPER BODY CLOTHING: A LITTLE
TOILETING: A LOT
HELP NEEDED FOR BATHING: A LOT
DRESSING REGULAR UPPER BODY CLOTHING: A LITTLE
MOVING TO AND FROM BED TO CHAIR: A LITTLE
TOILETING: A LOT
CLIMB 3 TO 5 STEPS WITH RAILING: A LOT
STANDING UP FROM CHAIR USING ARMS: A LITTLE
CLIMB 3 TO 5 STEPS WITH RAILING: A LOT
DAILY ACTIVITIY SCORE: 15
TURNING FROM BACK TO SIDE WHILE IN FLAT BAD: A LOT
DAILY ACTIVITIY SCORE: 15
MOBILITY SCORE: 17
STANDING UP FROM CHAIR USING ARMS: A LOT
HELP NEEDED FOR BATHING: A LOT
MOBILITY SCORE: 12
MOVING TO AND FROM BED TO CHAIR: A LOT

## 2024-09-19 ASSESSMENT — PAIN SCALES - GENERAL
PAINLEVEL_OUTOF10: 0 - NO PAIN
PAINLEVEL_OUTOF10: 0 - NO PAIN
PAINLEVEL_OUTOF10: 2
PAINLEVEL_OUTOF10: 0 - NO PAIN

## 2024-09-19 ASSESSMENT — ACTIVITIES OF DAILY LIVING (ADL)
HOME_MANAGEMENT_TIME_ENTRY: 12
HOME_MANAGEMENT_TIME_ENTRY: 25

## 2024-09-19 ASSESSMENT — PAIN - FUNCTIONAL ASSESSMENT
PAIN_FUNCTIONAL_ASSESSMENT: 0-10
PAIN_FUNCTIONAL_ASSESSMENT: 0-10

## 2024-09-19 NOTE — PROGRESS NOTES
"Occupational Therapy    OT Treatment    Patient Name: Shona Everett  MRN: [id number]  Today's Date: 9/19/2024  Time Calculation  Start Time: 0959  Stop Time: 1024  Time Calculation (min): 25 min       3114/3114-A    Assessment:  End of Session Communication: Bedside nurse  End of Session Patient Position: Up in chair, Alarm on       Plan:  OT Frequency: 3 times per week  OT Discharge Recommendations: Moderate intensity level of continued care     Subjective      09/19/24 0959   OT Last Visit   OT Received On 09/19/24   General   Reason for Referral ADL's; Safety Assessment   Referred By Nazario Flannery   Past Medical History Relevant to Rehab dementia, Afib, PVD, CABG   Prior to Session Communication Bedside nurse   Patient Position Received Up in chair;Alarm on   General Comment Pt agreeable to OT tx and cleared for participation. Pt is pleasantly confused, cooperative.     Returned to room at later time as chair alarm ringing- pt needing to use restroom. Min A overall to ambulate to and from bathroom, STS on standard toilet with increased cues for grab bar use, Min A for graeme care thoroughness. Additional 1 unit billed.    Precautions   Medical Precautions Fall precautions;Oxygen therapy device and L/min   Vital Signs   Vitals Session During OT   Heart Rate   (80's<>130's)   Heart Rate Source Monitor   SpO2   (89-90% on RA, donned 2L during activity with SpO2 93-98% - nursing informed)   Pain Assessment   0-10 (Numeric) Pain Score 0 - No pain   Cognition   Overall Cognitive Status Impaired  (nonsenical conversation throughout)   Orientation Level Disoriented to place;Disoriented to situation;Disoriented to time  (pt reporting being at her \"sister-in-law's house\" at start of session and again at end of session despite attempts to reorient)   Grooming   Grooming Level of Assistance Contact guard   Grooming Where Assessed Standing sinkside   Grooming Comments Pt completed various grooming tasks following cues for " fww placement and safety in stance at sink. Pt cued to initiate tasks while at sink, completed hair combing, oral care, hand washing with CGA overall, instructed in seated rest break following 7 minutes.   Functional Standing Tolerance   Time 7 minutes   Activity grooming tasks   Functional Standing Tolerance Comments CGA with varying UE support   Functional Mobility   Functional Mobility Performed   Pt ambulated short distance within room, chair>sink with CGA to Min A, chair follow for safety with cues for proper fww management, approach to sink with occasional hands-on assist for fww.   Transfer 1   Technique 1 Sit to stand;Stand to sit   Transfer Device 1 Gait belt;Walker   Transfer Level of Assistance 1 Minimum assistance;Moderate verbal cues   Trials/Comments 1 STS functional transfers from recliner level with cues for proper UE/LE placement   Static Sitting Balance   Static Sitting-Balance Support Bilateral upper extremity supported;Feet supported   Static Sitting-Level of Assistance Distant supervision   Static Standing Balance   Static Standing-Balance Support Bilateral upper extremity supported   Static Standing-Level of Assistance Contact guard   Static Standing-Comment/Number of Minutes cues for upright stance   Dynamic Standing Balance   Dynamic Standing-Balance Support   (varying UE support)   Dynamic Standing-Level of Assistance Minimum assistance   Dynamic Standing-Balance Forward lean;Reaching for objects;Reaching across midline;Turning   Dynamic Standing-Comments cues for upright reuben   IP OT Assessment   End of Session Communication Bedside nurse   End of Session Patient Position Up in chair;Alarm on   Inpatient Plan   OT Frequency 3 times per week   OT Discharge Recommendations Moderate intensity level of continued care     Outcome Measures:[facility] Daily Activity  Putting on and taking off regular lower body clothing: A lot  Bathing (including washing, rinsing, drying): A lot  Putting on and taking  off regular upper body clothing: A little  Toileting, which includes using toilet, bedpan or urinal: A lot  Taking care of personal grooming such as brushing teeth: A little  Eating Meals: A little  Daily Activity - Total Score: 15  Education Documentation  No documentation found.  Education Comments  No comments found.            Goals:  Encounter Problems       Encounter Problems (Active)       OT Goals       OT Goal 1 (Progressing)       Start:  09/18/24    Expected End:  10/02/24       Pt will complete all bed mobility with supervision safely            OT Goal 2 (Progressing)       Start:  09/18/24    Expected End:  10/02/24       Pt with complete all transfers safely with SBA           OT Goal 3 (Progressing)       Start:  09/18/24    Expected End:  10/02/24       Pt will complete ADL's and mobility with good sit balance and fair+ stand balance            OT Goal 4 (Progressing)       Start:  09/18/24    Expected End:  10/02/24       Pt will complete LB dressing with CGA using adaptive device as needed           OT Goal 5 (Progressing)       Start:  09/18/24    Expected End:  10/02/24       Pt will complete grooming ADL's with SBA and fair+ stand balance

## 2024-09-19 NOTE — PROGRESS NOTES
Shona Everett is a 89 y.o. female on day 3 of admission presenting with Pneumonia.      Subjective   Patient trying to get out of bed this morning. On my arrival her hand was slightly bloody. On her tray table was her IV with bandage that was over it, also covered in blood. Patient has no complaints but wants to go home.      Objective     Last Recorded Vitals  /89 (BP Location: Right arm, Patient Position: Sitting)   Pulse (!) 149   Temp 36.9 °C (98.4 °F) (Temporal)   Resp 18   Wt 74 kg (163 lb 2.3 oz)   SpO2 93%   Intake/Output last 3 Shifts:    Intake/Output Summary (Last 24 hours) at 9/19/2024 1236  Last data filed at 9/19/2024 0349  Gross per 24 hour   Intake 1058.75 ml   Output 450 ml   Net 608.75 ml       Admission Weight  Weight: 74.8 kg (165 lb) (09/16/24 1904)    Daily Weight  09/16/24 : 74 kg (163 lb 2.3 oz)    Image Results  FL modified barium swallow study  Narrative: Interpreted By:  Georges Vega,  and Berhane Gray   STUDY:  FL MODIFIED BARIUM SWALLOW STUDY;; 9/18/2024 11:57 am      INDICATION:  Signs/Symptoms:RLL pna w/ advanced dementia.          COMPARISON:  None.      ACCESSION NUMBER(S):  [id number]      ORDERING CLINICIAN:  MACHELLE WANG      TECHNIQUE:  Modified Barium Swallow Study completed after informed verbal  consent. The study was completed per protocol with various liquid and  solid barium consistencies. The anatomic structures and function of  the oropharynx, larynx, hypopharynx and cervical esophagus were  evaluated.      Marina Last MA, CCC-SLP  Phone/Pager: Epic/Haiku secure chat      SPEECH FINDINGS:  Reason for referral: Assess swallow function  Patient hx: 89 y.o. female presenting with initial concern for  increasing weakness with difficulty ambulating. Patient has a  well-documented history of dementia. 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 At baseline, she is described as ANO  x 1. Respiratory  status: nasal cannula, 3L Current diet: regular/thin      FINAL SPEECH RECOMMENDATIONS  DIET: Regular/thin  SWALLOW PRECAUTIONS:  -Sit upright 90 degrees for all PO  -Remain upright 20-30 minutes after meals  -Feed/eat at a slow rate  -Small bite/sip  -Alternate liquids/solids  -Medications whole with liquid or puree carrier if large; may  consider crushing if large and appropriate to do so -Diligent and  regular oral Care      Plan:  Treatment/Interventions: Diet tolerance  SLP Plan: Skilled SLP warranted  SLP Frequency: 1 follow up session  Duration: 1 week      Discussed POC: Patient  Discussed Risks/Benefits: Yes  Patient/Caregiver Agreeable: Yes      Pain Scale: 0/10, 0-no pain      GOALS: Established 9/18/24  1. Patient/Caregiver will demonstrate knowledge of taught  compensatory strategies and dietary consistency recommendations to  optimize functional swallow function without overt clinical signs and  symptoms of aspiration or dysphagia      Long term goal: Maintain adequate nutrition and hydration with the  least restrictive oral diet with no overt s/s of aspiration or  pulmonary compromise.      Education provided: ST provided education to Patient regarding MBSS  impressions, recommendations and POC at this time. Verbal  understanding and agreement given on all accounts.      Treatment Provided today: No      Additional consult suggested: No      Repeat study/ dc plan: No      Mechanics of the swallow summary:  ORAL PHASE:  Lip Closure - No labial escape/anterior loss of bolus  Tongue Control During Bolus Hold - Escape to lateral buccal cavity  and/or floor of mouth Bolus prep/mastication - Slow prolonged  mastication with complete re-collection necessary Bolus  transport/lingual motion - Repetitive/disorganized tongue motion  (tongue pumping) Oral residue - Residue collection on oral structure      PHARYNGEAL PHASE:  Initiation of pharyngeal swallow - Bolus head at pit of pyriforms  Soft  palate elevation - No bolus between soft palate/pharyngeal wall  Laryngeal elevation - Complete superior movement of thyroid cartilage  with contact of arytenoids to epiglottic petiole Anterior hyoid  excursion - Complete anterior movement Epiglottic movement - Complete  inversion Laryngeal vestibule closure - Complete - no air/contrast in  laryngeal vestibule Pharyngeal stripping wave - Complete  Pharyngeal contraction (A/P view) - Not tested  Pharyngoesophageal segment opening - Complete distension and complete  duration/no obstruction of flow of bolus Tongue base retraction - No  bolus between tongue base and posterior pharyngeal wall Pharyngeal  residue - Complete pharyngeal clearance      ESOPHAGEAL PHASE:  Esophageal clearance - Not evaluated          SLP Impressions with severity rating:  Patient presents with mild oral dysphagia and pharyngeal swallow  grossly within functional limits upon completion of modified barium  swallow study. Swallowing impairments, identified above, were mild  and without significant impact on swallowing safety/efficiency.  Patient did not demonstrate penetration of any consistency, and no  aspiration was visualized during study. Patient demonstrated mild  oral residue that was reduced with cued or spontaneous repeat  swallows.      Per the results of this assessment, patient is appropriate to  continue baseline diet, with standard swallow precautions as noted  above. Speech therapy to follow for diet tolerance due to observed  oral dysphagia.      Dysphagia Outcome Severity Scale (YOHANA): 6 (Within functional limits)      Rosenbek's Penetration Aspiration Scale  Thin Liquids: 1. NO ASPIRATION & NO PENETRATION - no aspiration,  contrast does not enter airway [place] Thick Liquids: 1. NO ASPIRATION  & NO PENETRATION - no aspiration, contrast does not enter airway  Puree: 1. NO ASPIRATION & NO PENETRATION - no aspiration, contrast  does not enter airway Solids: 1. NO ASPIRATION & NO  PENETRATION -  no aspiration, contrast does not enter airway      Speech Therapy section of this report signed by Marina Last MA, CCC-SLP on 9/18/2024 at 12:43 pm.      RADIOLOGY FINDINGS:  Cervical spine degenerative changes. Trace anterolisthesis C4-5.      Radiology section of this report signed by Georges Vega.      Impression: No aspiration observed.      MACRO:  None      Signed by: Georges Vega 9/18/2024 12:50 PM  Dictation workstation:   [id number]      Physical Exam  Constitutional:       General: She is not in acute distress.     Appearance: Normal appearance.   HENT:      Head: Normocephalic and atraumatic.      Mouth/Throat:      Mouth: Mucous membranes are moist.   Eyes:      Extraocular Movements: Extraocular movements intact.      Conjunctiva/sclera: Conjunctivae normal.      Pupils: Pupils are equal, round, and reactive to light.   Cardiovascular:      Rate and Rhythm: Normal rate and regular rhythm.      Heart sounds: Normal heart sounds.   Pulmonary:      Effort: Pulmonary effort is normal.      Breath sounds: Normal breath sounds. No wheezing, rhonchi or rales.   Abdominal:      General: Abdomen is flat. Bowel sounds are normal.      Palpations: Abdomen is soft.   Musculoskeletal:         General: No swelling or tenderness. Normal range of motion.      Cervical back: Normal range of motion and neck supple.   Skin:     General: Skin is warm and dry.      Findings: No rash.   Neurological:      General: No focal deficit present.      Mental Status: She is alert. Mental status is at baseline. She is disoriented.      Cranial Nerves: No cranial nerve deficit.      Sensory: No sensory deficit.   Psychiatric:         Mood and Affect: Mood normal.      Comments: Poor to no insight currently         Relevant Results  Scheduled medications  divalproex sprinkle, 125 mg, oral, q12h MANFRED  levoFLOXacin, 750 mg, oral, q48h  levothyroxine, 88 mcg, oral, Daily  metoprolol succinate XL, 50 mg, oral,  BID  oxygen, , inhalation, Continuous - Inhalation  sertraline, 25 mg, oral, Daily      Continuous medications  potassium chloride in 0.9%NaCl, 75 mL/hr, Last Rate: 75 mL/hr (09/18/24 2130)      PRN medications  PRN medications: acetaminophen, benzonatate, dextromethorphan-guaifenesin, melatonin, [DISCONTINUED] ondansetron ODT **OR** ondansetron    Results for orders placed or performed during the hospital encounter of 09/16/24 (from the past 24 hour(s))   CBC   Result Value Ref Range    WBC 15.2 (H) 4.4 - 11.3 x10*3/uL    nRBC 0.0 0.0 - 0.0 /100 WBCs    RBC 3.69 (L) 4.00 - 5.20 x10*6/uL    Hemoglobin 10.4 (L) 12.0 - 16.0 g/dL    Hematocrit 33.1 (L) 36.0 - 46.0 %    MCV 90 80 - 100 fL    MCH 28.2 26.0 - 34.0 pg    MCHC 31.4 (L) 32.0 - 36.0 g/dL    RDW 15.9 (H) 11.5 - 14.5 %    Platelets 182 150 - 450 x10*3/uL   Basic Metabolic Panel   Result Value Ref Range    Glucose 84 74 - 99 mg/dL    Sodium 134 (L) 136 - 145 mmol/L    Potassium 3.6 3.5 - 5.3 mmol/L    Chloride 103 98 - 107 mmol/L    Bicarbonate 21 21 - 32 mmol/L    Anion Gap 14 10 - 20 mmol/L    Urea Nitrogen 30 (H) 6 - 23 mg/dL    Creatinine 0.82 0.50 - 1.05 mg/dL    eGFR 69 >60 mL/min/1.73m*2    Calcium 8.1 (L) 8.6 - 10.3 mg/dL   Magnesium   Result Value Ref Range    Magnesium 2.46 (H) 1.60 - 2.40 mg/dL   Phosphorus   Result Value Ref Range    Phosphorus 2.1 (L) 2.5 - 4.9 mg/dL       ECG 12 lead    Result Date: 9/17/2024  Atrial fibrillation with rapid ventricular response Right axis deviation Septal infarct (cited on or before 16-SEP-2024) Marked ST abnormality, possible inferior subendocardial injury Abnormal ECG When compared with ECG of 03-JAN-2023 13:41, Previous ECG has undetermined rhythm, needs review Serial changes of evolving Septal infarct Present    XR chest 1 view    Result Date: 9/16/2024  Interpreted By:  Olman Bowie, STUDY: XR CHEST 1 VIEW;  9/16/2024 9:00 pm   INDICATION: Signs/Symptoms:heart failure vs pneumonia.     COMPARISON:  01/03/2023   ACCESSION NUMBER(S): [id number]   ORDERING CLINICIAN: JOSE ANGEL COATS   FINDINGS:     The heart is enlarged. Mild vascular congestion and peribronchovascular interstitial thickening is noted. Median sternotomy noted. Atherosclerotic aorta. New area of dense consolidation at the right peripheral lung base, likely right middle lobe.         Dense consolidation at the right lung base consistent with pneumonia.   Cardiomegaly with baseline bronchovascular interstitial prominence that likely relates to a degree of interstitial edema.   MACRO: None.   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)    Leukocytosis    Sepsis with acute organ dysfunction (Multi)    Bandemia    Coagulopathy (Multi)      -patient with PNA on CXR, especially RLL; may be some concern for aspiration PNA  -patient with clear evidence of sepsis given PNA + leukocytosis, bandemia, likely DALTON, and lactic acidosis   -continue Levaquin  -given patient's advanced age and advanced dementia, pall care and hospice consulted; for now patient to be enrolled in navigator program; appreciate co-management  -given Vit K for INR > 8; back to 1.8 now; son in agreement coumadin now represents more potential harm than potential benefit so will stop coumadin  -continue levaquin; follow up blood cultures (NGTD x 2 day); changed levaquin to PO 9/19 due to removing IV multiple times  -start depakote 125mg PO BID  -continue IVF; electrolytes repleted as appropriate  -PT/OT/SLP consulted; appreciate evaluations; plan for SNF, regular diet, thin liquids  -Home medications resumed as appropriate    Code: DNR comfort care arrest, DNI, no ICU  DVT prophylaxis: Not indicated  GI prophylaxis: Not  indicated  Diet: regular, thin    Dispo: Clear for DC to SNF when facility chosen and patient is accepted with precertification obtained.    Nazario Flannery MD

## 2024-09-19 NOTE — PROGRESS NOTES
Speech-Language Pathology    SLP Adult Inpatient  Speech-Language Pathology Treatment     Patient Name: Shona Everett  MRN: [id number]  Today's Date: 9/19/2024  Time Calculation  Start Time: 1417  Stop Time: 1425  Time Calculation (min): 8 min         Current Problem:   1. Atrial fibrillation with RVR (Multi)        2. Sepsis, due to unspecified organism, unspecified whether acute organ dysfunction present (Multi)        3. Pneumonia due to infectious organism, unspecified laterality, unspecified part of lung        4. Elevated brain natriuretic peptide (BNP) level        5. Supratherapeutic INR        6. DALTON (acute kidney injury) (CMS-HCC)        7. Hypokalemia          Assessment:  Patient was seen for dysphagia management following a modified barium swallow study (MBSS) completed 9/18/24. Neither the patient, family or caregiver report any difficulty swallowing. SLP provided a review of recommended swallow strategies. Pt was observed with thin liquid (ice cream) with no overt s/s of aspiration. Chart review does not indicate worsening pulmonary status. The patient appears to be at baseline function for swallowing and has met all goals for the current setting. ST to sign off at this time. Re-consult should any new concerns arise.     Recommendations:  Solid Diet Recommendations: Regular (IDDSI Level 7)   Liquid Diet Recommendations: Thin (IDDSI Level 0)   Swallow Precautions:   -Sit upright 90 degrees for all PO  -Remain upright 20-30 minutes after meals  -Feed/eat at a slow rate  -Small bite/sip  -Alternate liquids/solids  -Diligent and regular oral Care   -Medications whole with liquid or puree carrier if large; may consider crushing if large and appropriate to do so    Interventions:  Therapeutic Swallow Intervention : PO Trials (Patient/family/caregiver information)  Pharyngeal Strengthening Techniques: Effortful Swallow     Baseline Assessment:  Oxygen: Room air     Plan:   Plan  Inpatient/Swing Bed or  Outpatient: Inpatient  SLP TX Plan: Discharge from Speech Therapy  SLP Plan: Skilled SLP, No skilled SLP  No Skilled SLP: No acute SLP goals identified  SLP Frequency: Follow-up visit only  Duration: 2 weeks  SLP Discharge Recommendations: D/C as patient is functional for this level of care, D/C with all goals met  Next Treatment Priority: diet jeremy (solids)  Discussed POC: Patient, Caregiver/family  Discussed Risks/Benefits: Yes  Patient/Caregiver Agreeable: Yes  SLP - OK to Discharge: Yes     GOALS: Established 9/18/24  Patient/Caregiver will demonstrate knowledge of taught compensatory strategies and dietary consistency recommendations to optimize functional swallow function without overt clinical signs and symptoms of aspiration or dysphagia GOAL MET    Subjective:  Patient was seen upright in the chair. She was pleasant and cooperative for the session.    Pain:  Pain Assessment  Pain Assessment: 0-10  0-10 (Numeric) Pain Score: 0 - No pain     Education:  Learner:  Patient, Family  Barriers to Learning: Cognitive limitations   Method: Verbal  Education - Topic: ST provided patient education regarding role of ST, purpose of assessment, clinical impressions, goals of treatment, and plan of care. Patient verbalized full comprehension, consistent with cognitive status. Education will be reinforced. ST further coordinated with RN regarding recommendations and precautions per this assessment, with RN verbalizing understanding.  Outcome:  Verbalized understanding and agreement, Meets goals/outcomes

## 2024-09-19 NOTE — PROGRESS NOTES
Physical Therapy    Physical Therapy Treatment    Patient Name: Shona Everett  MRN: [id number]  Today's Date: 9/19/2024  Time Calculation  Start Time: 1020  Stop Time: 1100  Time Calculation (min): 40 min     3114/3114-A       09/19/24 1020   PT  Visit   PT Received On 09/19/24   Response to Previous Treatment Patient with no complaints from previous session.   General   Reason for Referral impaired mobility   Referred By Nazario Flannery   Past Medical History Relevant to Rehab dementia, Afib, PVD, CABG   Prior to Session Communication Bedside nurse   Patient Position Received Up in chair;Alarm on   General Comment Pt. was seated in the chair when I arrived. She was agreeable to PT,. No c/o pain.   Precautions   Medical Precautions Fall precautions;Oxygen therapy device and L/min  (2LNC)   Precautions Comment Bed/Chair Alarm   Vital Signs   Heart Rate   (110-130 SpO2 monitor, palpated radial pulse.)   Cognition   Orientation Level Disoriented to situation;Disoriented to time;Disoriented to place   Static Sitting Balance   Static Sitting-Level of Assistance Independent   Dynamic Sitting Balance   Dynamic Sitting-Level of Assistance Close supervision   Static Standing Balance   Static Standing-Level of Assistance   (Walker)   Therapeutic Exercise   Therapeutic Exercise Performed Yes   Therapeutic Exercise Activity 2 Seated Ex's Heel/Toe Raises, Hip abd/add, Hip Flexion, LAQ, x 2x10   Ambulation/Gait Training   Ambulation/Gait Training Performed Yes   Ambulation/Gait Training 1   Surface 1 Level tile   Device 1 Rolling walker   Gait Support Devices Gait belt   Assistance 1 Contact guard;Minimal tactile cues  (Chair follow. Directional cues.)   Quality of Gait 1 Inconsistent stride length;Diminished heel strike;WBOS   Comments/Distance (ft) 1 Pt. ambulated 15'x1, then  35'x2, with the wheeled walker. Unsteady. Path deviation. O2 on 2L for ambulation. Pt. c/o fatigue on longer distances. Diretional cues, Cues to  determine level of exertion.   Transfers   Transfer Yes   Transfer 1   Transfer From 1 Sit to   Transfer to 1 Stand   Technique 1 Sit to stand;Stand to sit   Transfer Device 1 Walker;Gait belt   Transfer Level of Assistance 1 Contact guard;Minimum assistance   Trials/Comments 1 Cues for hand placement.   Activity Tolerance   Endurance Tolerates 30 min exercise with multiple rests   PT Assessment   PT Assessment Results Decreased strength;Decreased range of motion;Decreased endurance;Impaired balance;Decreased mobility;Decreased cognition;Impaired judgement;Decreased safety awareness   Rehab Prognosis Fair   End of Session Communication Bedside nurse   End of Session Patient Position Up in chair;Alarm on   PT Plan   PT Plan Ongoing PT   PT Frequency 3 times per week   PT Discharge Recommendations Moderate intensity level of continued care   Equipment Recommended upon Discharge Wheeled walker       Assessment/Plan   PT Assessment  PT Assessment Results: Decreased strength, Decreased range of motion, Decreased endurance, Impaired balance, Decreased mobility, Decreased cognition, Impaired judgement, Decreased safety awareness  Rehab Prognosis: Fair  End of Session Communication: Bedside nurse  End of Session Patient Position: Up in chair, Alarm on     PT Plan  Treatment/Interventions: Bed mobility, Transfer training, Gait training, Balance training, Neuromuscular re-education, Strengthening, Endurance training, Therapeutic exercise, Range of motion, Therapeutic activity, Home exercise program  PT Plan: Ongoing PT  PT Frequency: 3 times per week  PT Discharge Recommendations: Moderate intensity level of continued care  Equipment Recommended upon Discharge: Wheeled walker  PT Recommended Transfer Status: Assist x1 (Min A)  PT - OK to Discharge: Yes    Outcome Measures:  [facility] Basic Mobility  Turning from your back to your side while in a flat bed without using bedrails: A little  Moving from lying on your back to sitting  on the side of a flat bed without using bedrails: A little  Moving to and from bed to chair (including a wheelchair): A little  Standing up from a chair using your arms (e.g. wheelchair or bedside chair): A little  To walk in hospital room: A little  Climbing 3-5 steps with railing: A lot  Basic Mobility - Total Score: 17                             EDUCATION:     Education Documentation  Body Mechanics, taught by David Be PTA at 9/19/2024 11:25 AM.  Learner: Patient  Readiness: Acceptance  Method: Explanation, Demonstration  Response: Needs Reinforcement    Home Exercise Program, taught by David Be PTA at 9/19/2024 11:25 AM.  Learner: Patient  Readiness: Acceptance  Method: Explanation, Demonstration  Response: Needs Reinforcement    Mobility Training, taught by David Be PTA at 9/19/2024 11:25 AM.  Learner: Patient  Readiness: Acceptance  Method: Explanation, Demonstration  Response: Needs Reinforcement    Education Comments  No comments found.        GOALS:  Encounter Problems       Encounter Problems (Active)       PT Problem       Pt will be able to perform all bed mobility tasks with SBA.  (Progressing)       Start:  09/18/24    Expected End:  10/02/24            Pt will perform all transfers with SBA and FWW with proper safety mechanics.   (Progressing)       Start:  09/18/24    Expected End:  10/02/24            Pt will ambulate 75 ft with SBA using FWW for improved functional independence.  (Progressing)       Start:  09/18/24    Expected End:  10/02/24            Pt will be able to ambulate at least 75 ft with < or equal to 1 rest break while maintaining SpO2 > 90%.  (Progressing)       Start:  09/18/24    Expected End:  10/02/24               Pain - Adult

## 2024-09-19 NOTE — PROGRESS NOTES
09/19/24 0911   Discharge Planning   Home or Post Acute Services Post acute facilities (Rehab/SNF/etc)   Type of Post Acute Facility Services Skilled nursing   Expected Discharge Disposition SNF   Does the patient need discharge transport arranged? Yes   RoundTrip coordination needed? Yes   Has discharge transport been arranged? No     Spoke to patient's son, Delfino, on the phone to discuss SNF choices. He asked about facilities located in [place] and would prefer referrals to be sent to [facility], [facility], and [facility]. Once we receive replies from the facility, Delfino will pick a FOC. Message sent to [facility] to request new SNF referrals to be sent. CT team to follow.

## 2024-09-19 NOTE — CARE PLAN
The patient's goals for the shift include      The clinical goals for the shift include Pt will remain hemodynamically stable all shift    Over the shift, the patient did not make progress toward the following goals. Barriers to progression include . Recommendations to address these barriers include .

## 2024-09-19 NOTE — PROGRESS NOTES
09/19/24 1426   Discharge Planning   Home or Post Acute Services Post acute facilities (Rehab/SNF/etc)   Type of Post Acute Facility Services Skilled nursing   Expected Discharge Disposition SNF  ([facility], needs precert)   Does the patient need discharge transport arranged?   (family prefers to transport)   Patient Choice   Provider Choice list and CMS website (https://medicare.gov/care-compare#search) for post-acute Quality and Resource Measure Data were provided and reviewed with: Family        09/19/24 1426   Discharge Planning   Home or Post Acute Services Post acute facilities (Rehab/SNF/etc)   Type of Post Acute Facility Services Skilled nursing   Expected Discharge Disposition SNF  [facility], [facility])   Does the patient need discharge transport arranged?   (family prefers to transport)   Patient Choice   Provider Choice list and CMS website (https://medicare.gov/care-compare#search) for post-acute Quality and Resource Measure Data were provided and reviewed with: Family     Spoke with the pt's son and advised him that [facility] will have a bed this weekend, [facility] was still checking insurance and [facility] does not have a bed. His first choice is ';[facility]. Asked ONNR to start precert.    Missed Visit    PT entered room, pt. Eyes closed and lethargic requiring sternal rub to awaken to attempt treatment and Pt. Adamantly refusing stating he is too tired and will not perform OOB mobility during session. When repositioning was offered he also refused. PT explained benefits of OOB mobility for healing process and pt. Continued to refuse. Pt. Left with all needs met.     Ricardo Edmond, PT, DPT

## 2024-09-20 ENCOUNTER — APPOINTMENT (OUTPATIENT)
Dept: PRIMARY CARE | Facility: CLINIC | Age: 89
End: 2024-09-20
Payer: COMMERCIAL

## 2024-09-20 LAB
ANION GAP SERPL CALC-SCNC: 13 MMOL/L (ref 10–20)
BUN SERPL-MCNC: 25 MG/DL (ref 6–23)
CALCIUM SERPL-MCNC: 8.5 MG/DL (ref 8.6–10.3)
CHLORIDE SERPL-SCNC: 102 MMOL/L (ref 98–107)
CO2 SERPL-SCNC: 26 MMOL/L (ref 21–32)
CREAT SERPL-MCNC: 0.79 MG/DL (ref 0.5–1.05)
EGFRCR SERPLBLD CKD-EPI 2021: 72 ML/MIN/1.73M*2
ERYTHROCYTE [DISTWIDTH] IN BLOOD BY AUTOMATED COUNT: 15.6 % (ref 11.5–14.5)
GLUCOSE SERPL-MCNC: 111 MG/DL (ref 74–99)
HCT VFR BLD AUTO: 32.5 % (ref 36–46)
HGB BLD-MCNC: 10.5 G/DL (ref 12–16)
MAGNESIUM SERPL-MCNC: 2.08 MG/DL (ref 1.6–2.4)
MCH RBC QN AUTO: 28.2 PG (ref 26–34)
MCHC RBC AUTO-ENTMCNC: 32.3 G/DL (ref 32–36)
MCV RBC AUTO: 87 FL (ref 80–100)
NRBC BLD-RTO: 0 /100 WBCS (ref 0–0)
PHOSPHATE SERPL-MCNC: 2.6 MG/DL (ref 2.5–4.9)
PLATELET # BLD AUTO: 221 X10*3/UL (ref 150–450)
POTASSIUM SERPL-SCNC: 3.8 MMOL/L (ref 3.5–5.3)
RBC # BLD AUTO: 3.72 X10*6/UL (ref 4–5.2)
SODIUM SERPL-SCNC: 137 MMOL/L (ref 136–145)
WBC # BLD AUTO: 15.6 X10*3/UL (ref 4.4–11.3)

## 2024-09-20 PROCEDURE — 2500000005 HC RX 250 GENERAL PHARMACY W/O HCPCS: Performed by: INTERNAL MEDICINE

## 2024-09-20 PROCEDURE — 2500000004 HC RX 250 GENERAL PHARMACY W/ HCPCS (ALT 636 FOR OP/ED): Performed by: INTERNAL MEDICINE

## 2024-09-20 PROCEDURE — 99233 SBSQ HOSP IP/OBS HIGH 50: CPT | Performed by: INTERNAL MEDICINE

## 2024-09-20 PROCEDURE — 2500000002 HC RX 250 W HCPCS SELF ADMINISTERED DRUGS (ALT 637 FOR MEDICARE OP, ALT 636 FOR OP/ED): Performed by: INTERNAL MEDICINE

## 2024-09-20 PROCEDURE — 2500000001 HC RX 250 WO HCPCS SELF ADMINISTERED DRUGS (ALT 637 FOR MEDICARE OP): Performed by: INTERNAL MEDICINE

## 2024-09-20 PROCEDURE — 1200000002 HC GENERAL ROOM WITH TELEMETRY DAILY

## 2024-09-20 PROCEDURE — 80048 BASIC METABOLIC PNL TOTAL CA: CPT | Performed by: INTERNAL MEDICINE

## 2024-09-20 PROCEDURE — 84100 ASSAY OF PHOSPHORUS: CPT | Performed by: INTERNAL MEDICINE

## 2024-09-20 PROCEDURE — 85027 COMPLETE CBC AUTOMATED: CPT | Performed by: INTERNAL MEDICINE

## 2024-09-20 PROCEDURE — 83735 ASSAY OF MAGNESIUM: CPT | Performed by: INTERNAL MEDICINE

## 2024-09-20 PROCEDURE — 36415 COLL VENOUS BLD VENIPUNCTURE: CPT | Performed by: INTERNAL MEDICINE

## 2024-09-20 RX ORDER — METOPROLOL TARTRATE 1 MG/ML
5 INJECTION, SOLUTION INTRAVENOUS ONCE
Status: COMPLETED | OUTPATIENT
Start: 2024-09-20 | End: 2024-09-20

## 2024-09-20 ASSESSMENT — PAIN - FUNCTIONAL ASSESSMENT
PAIN_FUNCTIONAL_ASSESSMENT: 0-10

## 2024-09-20 ASSESSMENT — PAIN DESCRIPTION - LOCATION: LOCATION: KNEE

## 2024-09-20 ASSESSMENT — COGNITIVE AND FUNCTIONAL STATUS - GENERAL
PERSONAL GROOMING: A LITTLE
CLIMB 3 TO 5 STEPS WITH RAILING: A LOT
DRESSING REGULAR UPPER BODY CLOTHING: A LITTLE
STANDING UP FROM CHAIR USING ARMS: A LOT
DAILY ACTIVITIY SCORE: 16
HELP NEEDED FOR BATHING: A LOT
WALKING IN HOSPITAL ROOM: A LOT
TURNING FROM BACK TO SIDE WHILE IN FLAT BAD: A LITTLE
DRESSING REGULAR LOWER BODY CLOTHING: A LOT
TOILETING: A LOT

## 2024-09-20 ASSESSMENT — PAIN DESCRIPTION - ORIENTATION: ORIENTATION: RIGHT;LEFT

## 2024-09-20 ASSESSMENT — PAIN SCALES - GENERAL
PAINLEVEL_OUTOF10: 4
PAINLEVEL_OUTOF10: 0 - NO PAIN
PAINLEVEL_OUTOF10: 0 - NO PAIN

## 2024-09-20 NOTE — PROGRESS NOTES
"Shona Everett is a 89 y.o. female on day 4 of admission presenting with Pneumonia.    Subjective   Patient seen and examined, overnight she was tachycardic and received 2 doses of Lopressor.  Her heart rate is stable today.  She was sleeping and denies any significant complaints.  She appeared comfortable.  Patient had her oxygen off when I evaluated her, I did check her pulse ox and she was 95%.  She is still on Levaquin for pneumonia.  Awaiting SNF placement.       Objective     Physical Exam  Vitals reviewed.   Constitutional:       Appearance: Normal appearance.   HENT:      Head: Normocephalic and atraumatic.      Nose: Nose normal.   Cardiovascular:      Rate and Rhythm: Normal rate and regular rhythm.      Pulses: Normal pulses.      Heart sounds: Normal heart sounds.   Pulmonary:      Effort: Pulmonary effort is normal.      Breath sounds: Normal breath sounds. No rales.      Comments: Decreased breath sounds throughout  Abdominal:      General: Abdomen is flat. Bowel sounds are normal.      Palpations: Abdomen is soft.      Tenderness: There is no abdominal tenderness.   Skin:     General: Skin is warm and dry.   Neurological:      Mental Status: She is alert. Mental status is at baseline.   Psychiatric:         Mood and Affect: Mood normal.         Last Recorded Vitals  Blood pressure 135/79, pulse 109, temperature 35.9 °C (96.6 °F), temperature source Temporal, resp. rate 20, height 1.651 m (5' 5\"), weight 74 kg (163 lb 2.3 oz), SpO2 95%.  Intake/Output last 3 Shifts:  I/O last 3 completed shifts:  In: 258.8 (3.5 mL/kg) [I.V.:258.8 (3.5 mL/kg)]  Out: 450 (6.1 mL/kg) [Urine:450 (0.2 mL/kg/hr)]  Weight: 74 kg     Relevant Results  Scheduled medications  divalproex sprinkle, 125 mg, oral, q12h MANFRED  levoFLOXacin, 750 mg, oral, q48h  levothyroxine, 88 mcg, oral, Daily  metoprolol succinate XL, 50 mg, oral, BID  oxygen, , inhalation, Continuous - Inhalation  sertraline, 25 mg, oral, Daily      Continuous " medications     PRN medications  PRN medications: acetaminophen, benzonatate, dextromethorphan-guaifenesin, melatonin, [DISCONTINUED] ondansetron ODT **OR** ondansetron  Results from last 7 days   Lab Units 09/20/24  0740 09/19/24  0656 09/18/24  0711   WBC AUTO x10*3/uL 15.6* 15.2* 19.5*   RBC AUTO x10*6/uL 3.72* 3.69* 3.65*   HEMOGLOBIN g/dL 10.5* 10.4* 10.2*     Results from last 7 days   Lab Units 09/20/24  0740 09/19/24  0656 09/18/24  0711 09/17/24  1349 09/16/24  1943 09/16/24 1943   SODIUM mmol/L 137 134* 137 138  --  138   POTASSIUM mmol/L 3.8 3.6 3.0* 2.7*  --  3.0*   CHLORIDE mmol/L 102 103 99 98  --  95*   CO2 mmol/L 26 21 28 30  --  30   BUN mg/dL 25* 30* 33* 32*  --  29*   CREATININE mg/dL 0.79 0.82 0.93 1.11*  --  1.23*   CALCIUM mg/dL 8.5* 8.1* 8.5* 8.3*  --  9.5   PHOSPHORUS mg/dL 2.6 2.1* 2.4* 2.3*   < >  --    MAGNESIUM mg/dL 2.08 2.46* 2.51* 1.50*   < >  --    BILIRUBIN TOTAL mg/dL  --   --  1.2 1.1  --  1.4*   ALT U/L  --   --  19 17  --  20   AST U/L  --   --  28 25  --  22    < > = values in this interval not displayed.       Assessment/Plan   Principal Problem:    Pneumonia  Active Problems:    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)    Acute hypoxemic respiratory failure (Multi)    Leukocytosis    Atrial fibrillation with RVR (Multi)    Sepsis with acute organ dysfunction (Multi)    Bandemia    Coagulopathy (Multi)    Plan:  -over night received metoprolol x 2 for tachycardia, HR stabilized today  -wean O2 as tolerated, 95% on room air during my evaluation  -labs stable, leukocytosis downtrending  -patient with PNA on CXR, especially RLL; may be some concern for aspiration PNA  -patient with clear evidence of sepsis given PNA + leukocytosis, bandemia, likely DALTON, and lactic acidosis   -continue Levaquin  -given patient's advanced age and advanced dementia, pall care and hospice consulted; for now patient to be enrolled in navigator program;  appreciate co-management  -given Vit K for INR > 8; back to 1.8 now; son in agreement coumadin now represents more potential harm than potential benefit so will stop coumadin  -continue levaquin; follow up blood cultures (NGTD x 2 day); changed levaquin to PO 9/19 due to removing IV multiple times  -start depakote 125mg PO BID  -PT/OT/SLP consulted; appreciate evaluations; plan for SNF, regular diet, thin liquids  -Home medications resumed as appropriate     Code: DNR comfort care arrest, DNI, no ICU  DVT prophylaxis: Not indicated  GI prophylaxis: Not indicated  Diet: regular, thin     Dispo: Clear for DC to SNF when facility chosen and patient is accepted with precertification obtained.       I spent 35 minutes in the professional and overall care of this patient.      Marisela Arteaga, DO

## 2024-09-20 NOTE — CARE PLAN
The patient's goals for the shift include      The clinical goals for the shift include pt will maintain HR below 110 this shift    Goals progressing, safety maintained. Pt medicated for cough, nausea and for elevated heart rate this night. O2 remains in place at present. Tele monitor in place.

## 2024-09-21 LAB
BACTERIA BLD CULT: NORMAL
BACTERIA BLD CULT: NORMAL

## 2024-09-21 PROCEDURE — 2500000001 HC RX 250 WO HCPCS SELF ADMINISTERED DRUGS (ALT 637 FOR MEDICARE OP): Performed by: INTERNAL MEDICINE

## 2024-09-21 PROCEDURE — 99232 SBSQ HOSP IP/OBS MODERATE 35: CPT | Performed by: INTERNAL MEDICINE

## 2024-09-21 PROCEDURE — 2500000002 HC RX 250 W HCPCS SELF ADMINISTERED DRUGS (ALT 637 FOR MEDICARE OP, ALT 636 FOR OP/ED): Performed by: INTERNAL MEDICINE

## 2024-09-21 PROCEDURE — 1200000002 HC GENERAL ROOM WITH TELEMETRY DAILY

## 2024-09-21 PROCEDURE — 2500000005 HC RX 250 GENERAL PHARMACY W/O HCPCS: Performed by: INTERNAL MEDICINE

## 2024-09-21 ASSESSMENT — COGNITIVE AND FUNCTIONAL STATUS - GENERAL
DAILY ACTIVITIY SCORE: 16
HELP NEEDED FOR BATHING: A LOT
CLIMB 3 TO 5 STEPS WITH RAILING: A LOT
MOBILITY SCORE: 15
STANDING UP FROM CHAIR USING ARMS: A LOT
PERSONAL GROOMING: A LITTLE
MOVING TO AND FROM BED TO CHAIR: A LOT
DRESSING REGULAR LOWER BODY CLOTHING: A LOT
DRESSING REGULAR UPPER BODY CLOTHING: A LITTLE
WALKING IN HOSPITAL ROOM: A LOT
TURNING FROM BACK TO SIDE WHILE IN FLAT BAD: A LITTLE
TOILETING: A LOT

## 2024-09-21 ASSESSMENT — PAIN SCALES - GENERAL
PAINLEVEL_OUTOF10: 0 - NO PAIN
PAINLEVEL_OUTOF10: 0 - NO PAIN

## 2024-09-21 ASSESSMENT — PAIN - FUNCTIONAL ASSESSMENT
PAIN_FUNCTIONAL_ASSESSMENT: 0-10
PAIN_FUNCTIONAL_ASSESSMENT: 0-10

## 2024-09-21 NOTE — PROGRESS NOTES
"Shona Everett is a 89 y.o. female on day 5 of admission presenting with Pneumonia.    Subjective   Patient seen and examined, no acute events overnight.  Patient was resting this morning, she denies any significant complaints.  Per nursing staff she is more awake and alert compared to last week.  She is intermittently on 2 L nasal cannula, she continues to take off the oxygen at times.  Awaiting SNF placement.  Her tachycardia has improved.    Objective     Physical Exam  Vitals reviewed.   Constitutional:       Appearance: Normal appearance.   HENT:      Head: Normocephalic and atraumatic.      Nose: Nose normal.   Cardiovascular:      Rate and Rhythm: Normal rate and regular rhythm.      Pulses: Normal pulses.      Heart sounds: Normal heart sounds.   Pulmonary:      Effort: Pulmonary effort is normal.      Breath sounds: Normal breath sounds. No rales.      Comments: On 2 L nasal cannula  Abdominal:      General: Abdomen is flat. Bowel sounds are normal.      Palpations: Abdomen is soft.      Tenderness: There is no abdominal tenderness.   Skin:     General: Skin is warm and dry.   Neurological:      Mental Status: She is alert.   Psychiatric:         Mood and Affect: Mood normal.         Last Recorded Vitals  Blood pressure 145/65, pulse 77, temperature 36 °C (96.8 °F), temperature source Temporal, resp. rate 18, height 1.651 m (5' 5\"), weight 74 kg (163 lb 2.3 oz), SpO2 96%.  Intake/Output last 3 Shifts:  I/O last 3 completed shifts:  In: - (0 mL/kg)   Out: 400 (5.4 mL/kg) [Urine:400 (0.2 mL/kg/hr)]  Weight: 74 kg     Relevant Results  Scheduled medications  divalproex sprinkle, 125 mg, oral, q12h MANFRED  levothyroxine, 88 mcg, oral, Daily  metoprolol succinate XL, 50 mg, oral, BID  oxygen, , inhalation, Continuous - Inhalation  sertraline, 25 mg, oral, Daily      Continuous medications     PRN medications  PRN medications: acetaminophen, benzonatate, dextromethorphan-guaifenesin, melatonin, [DISCONTINUED] " ondansetron ODT **OR** ondansetron  Results from last 7 days   Lab Units 09/20/24  0740 09/19/24  0656 09/18/24  0711   WBC AUTO x10*3/uL 15.6* 15.2* 19.5*   RBC AUTO x10*6/uL 3.72* 3.69* 3.65*   HEMOGLOBIN g/dL 10.5* 10.4* 10.2*     Results from last 7 days   Lab Units 09/20/24  0740 09/19/24  0656 09/18/24  0711 09/17/24  1349 09/16/24  1943 09/16/24 1943   SODIUM mmol/L 137 134* 137 138  --  138   POTASSIUM mmol/L 3.8 3.6 3.0* 2.7*  --  3.0*   CHLORIDE mmol/L 102 103 99 98  --  95*   CO2 mmol/L 26 21 28 30  --  30   BUN mg/dL 25* 30* 33* 32*  --  29*   CREATININE mg/dL 0.79 0.82 0.93 1.11*  --  1.23*   CALCIUM mg/dL 8.5* 8.1* 8.5* 8.3*  --  9.5   PHOSPHORUS mg/dL 2.6 2.1* 2.4* 2.3*   < >  --    MAGNESIUM mg/dL 2.08 2.46* 2.51* 1.50*   < >  --    BILIRUBIN TOTAL mg/dL  --   --  1.2 1.1  --  1.4*   ALT U/L  --   --  19 17  --  20   AST U/L  --   --  28 25  --  22    < > = values in this interval not displayed.       Assessment/Plan   Principal Problem:    Pneumonia  Active Problems:    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)    Acute hypoxemic respiratory failure (Multi)    Leukocytosis    Atrial fibrillation with RVR (Multi)    Sepsis with acute organ dysfunction (Multi)    Bandemia    Coagulopathy (Multi)    Plan:  -awaiting SNF placement  -on 2 L NC, wean O2 as tolerated   -labs stable, leukocytosis downtrending  -patient with PNA on CXR, especially RLL; may be some concern for aspiration PNA  -patient with clear evidence of sepsis given PNA + leukocytosis, bandemia, likely DALTON, and lactic acidosis   -continue Levaquin  -given patient's advanced age and advanced dementia, pall care and hospice consulted; for now patient to be enrolled in navigator program; appreciate co-management  -given Vit K for INR > 8; back to 1.8 now; son in agreement coumadin now represents more potential harm than potential benefit so will stop coumadin  -continue levaquin; follow up blood  cultures (NGTD x 2 day); changed levaquin to PO 9/19 due to removing IV multiple times  -start depakote 125mg PO BID  -PT/OT/SLP consulted; appreciate evaluations; plan for SNF, regular diet, thin liquids  -Home medications resumed as appropriate     Code: DNR comfort care arrest, DNI, no ICU  DVT prophylaxis: Not indicated  GI prophylaxis: Not indicated  Diet: regular, thin     Dispo: Clear for DC to SNF when facility chosen and patient is accepted with precertification obtained.       I spent 35 minutes in the professional and overall care of this patient.      Marisela Arteaga DO

## 2024-09-21 NOTE — CARE PLAN
The patient's goals for the shift include      The clinical goals for the shift include Pt will remain HDS this shift    Goals progressing, safety maintained. Pt continues with productive cough, more alert and oriented this am.

## 2024-09-22 VITALS
TEMPERATURE: 97 F | OXYGEN SATURATION: 94 % | SYSTOLIC BLOOD PRESSURE: 111 MMHG | WEIGHT: 163.14 LBS | RESPIRATION RATE: 16 BRPM | BODY MASS INDEX: 27.18 KG/M2 | DIASTOLIC BLOOD PRESSURE: 66 MMHG | HEIGHT: 65 IN | HEART RATE: 96 BPM

## 2024-09-22 PROCEDURE — 99239 HOSP IP/OBS DSCHRG MGMT >30: CPT | Performed by: INTERNAL MEDICINE

## 2024-09-22 PROCEDURE — 2500000001 HC RX 250 WO HCPCS SELF ADMINISTERED DRUGS (ALT 637 FOR MEDICARE OP): Performed by: INTERNAL MEDICINE

## 2024-09-22 PROCEDURE — 2500000005 HC RX 250 GENERAL PHARMACY W/O HCPCS: Performed by: INTERNAL MEDICINE

## 2024-09-22 PROCEDURE — 2500000002 HC RX 250 W HCPCS SELF ADMINISTERED DRUGS (ALT 637 FOR MEDICARE OP, ALT 636 FOR OP/ED): Performed by: INTERNAL MEDICINE

## 2024-09-22 PROCEDURE — 1200000002 HC GENERAL ROOM WITH TELEMETRY DAILY

## 2024-09-22 RX ORDER — METOPROLOL TARTRATE 1 MG/ML
5 INJECTION, SOLUTION INTRAVENOUS ONCE
Status: COMPLETED | OUTPATIENT
Start: 2024-09-22 | End: 2024-09-22

## 2024-09-22 RX ORDER — METOPROLOL SUCCINATE 50 MG/1
50 TABLET, EXTENDED RELEASE ORAL ONCE
Status: DISCONTINUED | OUTPATIENT
Start: 2024-09-22 | End: 2024-09-22

## 2024-09-22 RX ORDER — DIVALPROEX SODIUM 125 MG/1
125 CAPSULE, COATED PELLETS ORAL EVERY 12 HOURS SCHEDULED
Qty: 60 CAPSULE | Refills: 0 | Status: SHIPPED | OUTPATIENT
Start: 2024-09-22 | End: 2024-09-28 | Stop reason: HOSPADM

## 2024-09-22 ASSESSMENT — COGNITIVE AND FUNCTIONAL STATUS - GENERAL
MOBILITY SCORE: 15
DRESSING REGULAR LOWER BODY CLOTHING: A LOT
STANDING UP FROM CHAIR USING ARMS: A LOT
PERSONAL GROOMING: A LITTLE
DRESSING REGULAR LOWER BODY CLOTHING: A LOT
DRESSING REGULAR UPPER BODY CLOTHING: A LITTLE
WALKING IN HOSPITAL ROOM: A LOT
CLIMB 3 TO 5 STEPS WITH RAILING: A LOT
CLIMB 3 TO 5 STEPS WITH RAILING: A LOT
TOILETING: A LOT
HELP NEEDED FOR BATHING: A LOT
TOILETING: A LOT
MOBILITY SCORE: 15
WALKING IN HOSPITAL ROOM: A LOT
STANDING UP FROM CHAIR USING ARMS: A LOT
DRESSING REGULAR UPPER BODY CLOTHING: A LITTLE
TURNING FROM BACK TO SIDE WHILE IN FLAT BAD: A LITTLE
HELP NEEDED FOR BATHING: A LOT
TURNING FROM BACK TO SIDE WHILE IN FLAT BAD: A LITTLE
MOVING TO AND FROM BED TO CHAIR: A LOT
DAILY ACTIVITIY SCORE: 16
DAILY ACTIVITIY SCORE: 16
MOVING TO AND FROM BED TO CHAIR: A LOT
PERSONAL GROOMING: A LITTLE

## 2024-09-22 ASSESSMENT — PAIN DESCRIPTION - ORIENTATION: ORIENTATION: RIGHT;LEFT

## 2024-09-22 ASSESSMENT — PAIN DESCRIPTION - LOCATION: LOCATION: LEG

## 2024-09-22 ASSESSMENT — PAIN SCALES - GENERAL
PAINLEVEL_OUTOF10: 0 - NO PAIN
PAINLEVEL_OUTOF10: 5 - MODERATE PAIN

## 2024-09-22 ASSESSMENT — PAIN DESCRIPTION - DESCRIPTORS: DESCRIPTORS: ACHING

## 2024-09-22 ASSESSMENT — PAIN - FUNCTIONAL ASSESSMENT: PAIN_FUNCTIONAL_ASSESSMENT: CPOT (CRITICAL CARE PAIN OBSERVATION TOOL)

## 2024-09-22 NOTE — DISCHARGE SUMMARY
Patient left on 9/24, delay in DC waiting for SNF    Discharge Diagnosis  Pneumonia  Hypoxic respiratory failure, on 2 L nasal cannula  Supratherapeutic INR on Coumadin    Issues Requiring Follow-Up  Please see primary care doctor in 1 week  Your home Lasix, losartan and hydrochlorothiazide were held due to DALTON, can discuss with PCP re: restarting pending on renal function and BP  Discussed with PCP regarding home med list, at your age you may not need to be on a statin  Your home Coumadin has been held/discontinued due to elevated INR and risk versus benefit       Discharge Meds     Medication List      START taking these medications     divalproex sprinkle 125 mg DR capsule; Commonly known as: Depakote   Sprinkle; Take 1 capsule (125 mg) by mouth every 12 hours.   oxygen gas therapy; Commonly known as: O2; Inhale 1 each every 12 hours.     CONTINUE taking these medications     amLODIPine 10 mg tablet; Commonly known as: Norvasc; TAKE 1 TABLET BY   MOUTH EVERY DAY AS DIRECTED   atorvastatin 40 mg tablet; Commonly known as: Lipitor; TAKE 1 TABLET BY   MOUTH EVERY DAY   cholecalciferol 50,000 unit capsule; Commonly known as: Vitamin D-3;   PLEASE TAKE 1 TABLET BY MOUTH ONCE A WEEK, SUNDAYS, WITH LUNCH AND A FULL   GLASS OF WATER. THANK YOU.   ibandronate 150 mg tablet; Commonly known as: Boniva; Take 1 tablet (150   mg) by mouth every 30 (thirty) days. Take in morning with full glass of   water on an empty stomach. No food, drink, meds, or lying down for 60   minutes after.   levothyroxine 88 mcg tablet; Commonly known as: Synthroid, Levoxyl;   PLEASE TAKE 1 TABLET BY MOUTH BEFORE BREAKFAST. THANK YOU.   magnesium oxide 400 mg (241.3 mg magnesium) tablet; Commonly known as:   Mag-Ox; TAKE 1 TABLET BY MOUTH TWICE A DAY WITH BREAKFAST AND SUPPER   metoprolol succinate XL 50 mg 24 hr tablet; Commonly known as:   Toprol-XL; Take 1 tablet (50 mg) by mouth 2 times a day.   nitroglycerin 0.4 mg SL tablet; Commonly known as:  Nitrostat   sertraline 25 mg tablet; Commonly known as: Zoloft; TAKE 1 TABLET BY   MOUTH EVERY DAY     STOP taking these medications     furosemide 20 mg tablet; Commonly known as: Lasix   losartan-hydrochlorothiazide 100-25 mg tablet; Commonly known as: Hyzaar       Test Results Pending At Discharge  Pending Labs       No current pending labs.            Hospital Course   Shona Everett is a 89 y.o. female presenting with initial concern for increasing weakness with difficulty ambulating.  Patient has a well-documented history of dementia.  History of mechanical heart valve on Coumadin, amaurosis fugax, paroxysmal atrial fibrillation, CABG, CKD.  At baseline, she is described as ANO x 1.  She is a confirmed DNR CC.  On arrival, patient was noted to be in atrial fibrillation with rapid ventricular response.  Heart rates were initially in the 130s to 140s and she was treated with IV Lopressor.  Heart rates have improved, currently in the low 100s.  Additional workup revealed a dense consolidation at the right lung base consistent with pneumonia.  Patient with documented anaphylaxis to penicillins.  Was started on combination vancomycin, cefepime, and Flagyl.  In discussion with patient's family, ED reports family is most interested in pursuing palliative measures and possible hospice consultation.  They are comfortable with medications for rate control, IV fluids, and treatments generally thought to be minimally invasive however would not want aggressive care.  Admitted for ongoing management.     Hospital course:  Patient is an 89-year-old female with advanced dementia who presented with acute hypoxic respiratory failure secondary to pneumonia.  Patient is a DNR, DNI and no ICU.  She was started on Levaquin.  Her leukocytosis has improved.  She has a mild DALTON which also has improved.  Her home Lasix, losartan and hydrochlorothiazide were held.  Plan is for DC to SNF.  She was started on Depakote for mood  stabilization.  Her Coumadin was held due to elevated INR and risk versus benefit.  Patient will be discharged to SNF with 2 L nasal cannula.  The outpatient palliative care navigator program will follow patient.  Patient finished course of antibiotics for her pneumonia while inpatient, we are awaiting SNF placement.  She will be discharged on Depakote 125 mg oral twice a day for her mood stabilization.  Speech therapy recommends regular diet with thin liquids.    DC instructions provided to the patient:  Please see primary care doctor in 1 week  Your home Lasix, losartan and hydrochlorothiazide were held due to DALTON, can discuss with PCP re: restarting pending on renal function and BP  Discussed with PCP regarding home med list, at your age you may not need to be on a statin  Your home Coumadin has been held/discontinued due to elevated INR and risk versus benefit    Spent>35 minutes DC patient    Pertinent Physical Exam At Time of Discharge  Vitals reviewed.   Constitutional:       Appearance: Normal appearance.   HENT:      Head: Normocephalic and atraumatic.      Nose: Nose normal.   Cardiovascular:      Rate and Rhythm: Normal rate and regular rhythm.      Pulses: Normal pulses.      Heart sounds: Normal heart sounds.   Pulmonary:      Effort: Pulmonary effort is normal.      Breath sounds: Normal breath sounds. No rales.      Comments: Decreased breath sounds throughout, on 2 L nasal cannula  Abdominal:      General: Abdomen is flat. Bowel sounds are normal.      Palpations: Abdomen is soft.      Tenderness: There is no abdominal tenderness.   Skin:     General: Skin is warm and dry.   Neurological:      Mental Status: She is alert. Mental status is at baseline.   Psychiatric:         Mood and Affect: Mood normal.     Outpatient Follow-Up  Future Appointments   Date Time Provider Department Center   1/3/2025  1:00 PM Iva Sanchez MD [id number] [place]   2/21/2025  2:00 PM Apryl Parsons MD [id number] [place]          Marisela Arteaga, DO

## 2024-09-22 NOTE — NURSING NOTE
Pt slept mostly through the night.  Pt awoke at 0400 when a nearby pt was shouting out.  She tried to get out of bed to help this pt.  RN responded to bed alarm and redirected pt.  Assured her that the pt she heard was being care for by another.  Pt able to go back to sleep.  Call light within reach.  Bed alarm on.

## 2024-09-22 NOTE — DISCHARGE INSTRUCTIONS
Please see primary care doctor in 1 week  Your home Lasix, losartan and hydrochlorothiazide were held due to DALTON   can discuss with PCP re: restarting pending on renal function and BP  Discussed with PCP regarding home med list, at your age you may not need to be on a statin  Your home Coumadin has been held/discontinued due to elevated INR and risk versus benefit

## 2024-09-23 ENCOUNTER — APPOINTMENT (OUTPATIENT)
Dept: PRIMARY CARE | Facility: CLINIC | Age: 89
End: 2024-09-23
Payer: COMMERCIAL

## 2024-09-23 LAB
ATRIAL RATE: 113 BPM
Q ONSET: 217 MS
QRS COUNT: 22 BEATS
QRS DURATION: 108 MS
QT INTERVAL: 338 MS
QTC CALCULATION(BAZETT): 504 MS
QTC FREDERICIA: 441 MS
R AXIS: 115 DEGREES
T AXIS: -40 DEGREES
T OFFSET: 386 MS
VENTRICULAR RATE: 134 BPM

## 2024-09-23 PROCEDURE — 2500000005 HC RX 250 GENERAL PHARMACY W/O HCPCS: Performed by: INTERNAL MEDICINE

## 2024-09-23 PROCEDURE — 2500000001 HC RX 250 WO HCPCS SELF ADMINISTERED DRUGS (ALT 637 FOR MEDICARE OP): Performed by: INTERNAL MEDICINE

## 2024-09-23 PROCEDURE — 97110 THERAPEUTIC EXERCISES: CPT | Mod: GP,CQ

## 2024-09-23 PROCEDURE — 97535 SELF CARE MNGMENT TRAINING: CPT | Mod: GO,CO

## 2024-09-23 PROCEDURE — 2500000002 HC RX 250 W HCPCS SELF ADMINISTERED DRUGS (ALT 637 FOR MEDICARE OP, ALT 636 FOR OP/ED): Performed by: INTERNAL MEDICINE

## 2024-09-23 PROCEDURE — 1200000002 HC GENERAL ROOM WITH TELEMETRY DAILY

## 2024-09-23 PROCEDURE — 99232 SBSQ HOSP IP/OBS MODERATE 35: CPT | Performed by: INTERNAL MEDICINE

## 2024-09-23 PROCEDURE — 97116 GAIT TRAINING THERAPY: CPT | Mod: GP,CQ

## 2024-09-23 RX ORDER — METOPROLOL TARTRATE 1 MG/ML
5 INJECTION, SOLUTION INTRAVENOUS ONCE
Status: COMPLETED | OUTPATIENT
Start: 2024-09-23 | End: 2024-09-23

## 2024-09-23 ASSESSMENT — COGNITIVE AND FUNCTIONAL STATUS - GENERAL
CLIMB 3 TO 5 STEPS WITH RAILING: A LOT
EATING MEALS: A LITTLE
MOVING FROM LYING ON BACK TO SITTING ON SIDE OF FLAT BED WITH BEDRAILS: A LITTLE
DRESSING REGULAR UPPER BODY CLOTHING: A LITTLE
MOVING TO AND FROM BED TO CHAIR: A LITTLE
DAILY ACTIVITIY SCORE: 15
PERSONAL GROOMING: A LITTLE
WALKING IN HOSPITAL ROOM: A LITTLE
DRESSING REGULAR LOWER BODY CLOTHING: A LOT
MOBILITY SCORE: 17
TURNING FROM BACK TO SIDE WHILE IN FLAT BAD: A LITTLE
HELP NEEDED FOR BATHING: A LOT
STANDING UP FROM CHAIR USING ARMS: A LITTLE
TOILETING: A LOT

## 2024-09-23 ASSESSMENT — PAIN SCALES - GENERAL
PAINLEVEL_OUTOF10: 0 - NO PAIN

## 2024-09-23 ASSESSMENT — ACTIVITIES OF DAILY LIVING (ADL): HOME_MANAGEMENT_TIME_ENTRY: 31

## 2024-09-23 NOTE — CARE PLAN
Problem: Skin  Goal: Decreased wound size/increased tissue granulation at next dressing change  Outcome: Progressing  Flowsheets (Taken 9/23/2024 1135)  Decreased wound size/increased tissue granulation at next dressing change:   Promote sleep for wound healing   Protective dressings over bony prominences  Goal: Participates in plan/prevention/treatment measures  Outcome: Progressing  Flowsheets (Taken 9/23/2024 1135)  Participates in plan/prevention/treatment measures:   Discuss with provider PT/OT consult   Elevate heels  Goal: Prevent/manage excess moisture  Outcome: Progressing  Flowsheets (Taken 9/23/2024 1135)  Prevent/manage excess moisture:   Moisturize dry skin   Monitor for/manage infection if present  Goal: Prevent/minimize sheer/friction injuries  Outcome: Progressing  Flowsheets (Taken 9/23/2024 1135)  Prevent/minimize sheer/friction injuries:   Turn/reposition every 2 hours/use positioning/transfer devices   Use pull sheet   Increase activity/out of bed for meals  Goal: Promote/optimize nutrition  Outcome: Progressing  Flowsheets (Taken 9/23/2024 1135)  Promote/optimize nutrition:   Offer water/supplements/favorite foods   Monitor/record intake including meals  Goal: Promote skin healing  Outcome: Progressing  Flowsheets (Taken 9/23/2024 1135)  Promote skin healing:   Protective dressings over bony prominences   Rotate device position/do not position patient on device   Turn/reposition every 2 hours/use positioning/transfer devices   The patient's goals for the shift include comfort, sleep    The clinical goals for the shift include Pt will have improved lung function by the end of shift

## 2024-09-23 NOTE — CARE PLAN
Problem: Skin  Goal: Decreased wound size/increased tissue granulation at next dressing change  9/23/2024 1131 by Frandy Stafford RN  Flowsheets (Taken 9/22/2024 0820 by Johan Wilkes RN)  Decreased wound size/increased tissue granulation at next dressing change: Promote sleep for wound healing  9/23/2024 1130 by Frandy Stafford RN  Outcome: Progressing  9/23/2024 0747 by Frandy Stafford RN  Flowsheets (Taken 9/22/2024 0820 by Johan Wilkes RN)  Decreased wound size/increased tissue granulation at next dressing change: Promote sleep for wound healing  9/23/2024 0746 by Frandy Stafford RN  Outcome: Progressing  Goal: Participates in plan/prevention/treatment measures  9/23/2024 1131 by Frandy Stafford RN  Flowsheets (Taken 9/22/2024 0820 by Johan Wilkes RN)  Participates in plan/prevention/treatment measures: Increase activity/out of bed for meals  9/23/2024 1130 by Frandy Stafford RN  Outcome: Progressing  9/23/2024 0747 by Frandy Stafford RN  Flowsheets (Taken 9/22/2024 0820 by Johan Wilkes RN)  Participates in plan/prevention/treatment measures: Increase activity/out of bed for meals  9/23/2024 0746 by Frandy Stafford RN  Outcome: Progressing  Goal: Prevent/manage excess moisture  9/23/2024 1131 by Frandy Stafford RN  Flowsheets (Taken 9/22/2024 0820 by Johan Wilkes RN)  Prevent/manage excess moisture: Cleanse incontinence/protect with barrier cream  9/23/2024 1130 by Frandy Stafford RN  Outcome: Progressing  9/23/2024 0747 by Frandy Stafford RN  Flowsheets (Taken 9/22/2024 0820 by Johan Wilkes RN)  Prevent/manage excess moisture: Cleanse incontinence/protect with barrier cream  9/23/2024 0746 by Frandy Stafford RN  Outcome: Progressing  Goal: Prevent/minimize sheer/friction injuries  9/23/2024 1131 by Frandy Stafford RN  Flowsheets (Taken 9/22/2024 2425 by Johan Wilkes RN)  Prevent/minimize sheer/friction injuries:   HOB 30 degrees or less   Increase activity/out  of bed for meals   Turn/reposition every 2 hours/use positioning/transfer devices  9/23/2024 1130 by Frandy Stafford RN  Outcome: Progressing  9/23/2024 0747 by Frandy Stafford RN  Flowsheets (Taken 9/22/2024 0820 by Johan Wilkes RN)  Prevent/minimize sheer/friction injuries:   HOB 30 degrees or less   Increase activity/out of bed for meals   Turn/reposition every 2 hours/use positioning/transfer devices  9/23/2024 0746 by Frandy Stafford RN  Outcome: Progressing  Goal: Promote/optimize nutrition  9/23/2024 1131 by Frandy Stafford RN  Flowsheets (Taken 9/22/2024 0820 by Johan Wilkes RN)  Promote/optimize nutrition:   Offer water/supplements/favorite foods   Consume > 50% meals/supplements   Assist with feeding  9/23/2024 1130 by Frandy Stafford RN  Outcome: Progressing  9/23/2024 0747 by Frandy Stafford RN  Flowsheets (Taken 9/22/2024 0820 by Johan Wilkes RN)  Promote/optimize nutrition:   Offer water/supplements/favorite foods   Consume > 50% meals/supplements   Assist with feeding  9/23/2024 0746 by Frandy Stafford RN  Outcome: Progressing  Goal: Promote skin healing  9/23/2024 1131 by Frandy Stafford RN  Flowsheets (Taken 9/22/2024 0820 by Johan Wilkes RN)  Promote skin healing: Turn/reposition every 2 hours/use positioning/transfer devices  9/23/2024 1130 by Frandy Stafford RN  Outcome: Progressing  9/23/2024 0747 by Frandy Stafford RN  Flowsheets (Taken 9/22/2024 0820 by Johan Wilkes RN)  Promote skin healing: Turn/reposition every 2 hours/use positioning/transfer devices  9/23/2024 0746 by Frandy Stafford RN  Outcome: Progressing   The patient's goals for the shift include comfort, sleep    The clinical goals for the shift include Pt will have improved lung function by the end of shift

## 2024-09-23 NOTE — CARE PLAN
The patient's goals for the shift include comfort, sleep    The clinical goals for the shift include pt will get at least 4 hrs sleep this shift      Problem: Skin  Goal: Participates in plan/prevention/treatment measures  9/23/2024 0747 by Frandy Stafford RN  Flowsheets (Taken 9/22/2024 0820 by Johan Wilkes RN)  Participates in plan/prevention/treatment measures: Increase activity/out of bed for meals  9/23/2024 0746 by Frandy Stafford RN  Outcome: Progressing

## 2024-09-23 NOTE — PROGRESS NOTES
"Shona Everett is a 89 y.o. female on day 7 of admission presenting with Pneumonia.    Subjective   Patient seen and examined, no acute events overnight.  She is sitting up in the chair today eating breakfast.  She appears comfortable and denies any complaints.  She is intermittently needed 2 L nasal cannula oxygen.  I did try to call her son Delfino however there was no answer.  Awaiting SNF placement.       Objective     Physical Exam  Vitals reviewed.   Constitutional:       Appearance: Normal appearance.   HENT:      Head: Normocephalic and atraumatic.      Nose: Nose normal.   Cardiovascular:      Rate and Rhythm: Normal rate and regular rhythm.      Pulses: Normal pulses.      Heart sounds: Normal heart sounds.   Pulmonary:      Effort: Pulmonary effort is normal.      Breath sounds: Normal breath sounds. No rales.      Comments: On 2 L nasal cannula  Abdominal:      General: Abdomen is flat. Bowel sounds are normal.      Palpations: Abdomen is soft.      Tenderness: There is no abdominal tenderness.   Skin:     General: Skin is warm and dry.   Neurological:      Mental Status: She is alert and oriented to person, place, and time. Mental status is at baseline.   Psychiatric:         Mood and Affect: Mood normal.         Last Recorded Vitals  Blood pressure 127/83, pulse (!) 140, temperature 36 °C (96.8 °F), temperature source Temporal, resp. rate 18, height 1.651 m (5' 5\"), weight 74 kg (163 lb 2.3 oz), SpO2 98%.  Intake/Output last 3 Shifts:  I/O last 3 completed shifts:  In: 240 (3.2 mL/kg) [P.O.:240]  Out: 950 (12.8 mL/kg) [Urine:950 (0.4 mL/kg/hr)]  Weight: 74 kg     Relevant Results  Scheduled medications  divalproex sprinkle, 125 mg, oral, q12h MANFRED  levothyroxine, 88 mcg, oral, Daily  metoprolol succinate XL, 50 mg, oral, BID  oxygen, , inhalation, Continuous - Inhalation  sertraline, 25 mg, oral, Daily      Continuous medications     PRN medications  PRN medications: acetaminophen, benzonatate, " dextromethorphan-guaifenesin, melatonin, [DISCONTINUED] ondansetron ODT **OR** ondansetron  Results from last 7 days   Lab Units 09/20/24  0740 09/19/24  0656 09/18/24  0711   WBC AUTO x10*3/uL 15.6* 15.2* 19.5*   RBC AUTO x10*6/uL 3.72* 3.69* 3.65*   HEMOGLOBIN g/dL 10.5* 10.4* 10.2*     Results from last 7 days   Lab Units 09/20/24  0740 09/19/24  0656 09/18/24  0711 09/17/24  1349 09/16/24  1943 09/16/24 1943   SODIUM mmol/L 137 134* 137 138  --  138   POTASSIUM mmol/L 3.8 3.6 3.0* 2.7*  --  3.0*   CHLORIDE mmol/L 102 103 99 98  --  95*   CO2 mmol/L 26 21 28 30  --  30   BUN mg/dL 25* 30* 33* 32*  --  29*   CREATININE mg/dL 0.79 0.82 0.93 1.11*  --  1.23*   CALCIUM mg/dL 8.5* 8.1* 8.5* 8.3*  --  9.5   PHOSPHORUS mg/dL 2.6 2.1* 2.4* 2.3*   < >  --    MAGNESIUM mg/dL 2.08 2.46* 2.51* 1.50*   < >  --    BILIRUBIN TOTAL mg/dL  --   --  1.2 1.1  --  1.4*   ALT U/L  --   --  19 17  --  20   AST U/L  --   --  28 25  --  22    < > = values in this interval not displayed.       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  Patient is a DNR, DNI and no ICU.  She was started on Levaquin.  Her leukocytosis has improved.  She has a mild DALTON which also has improved.  Her home Lasix, losartan and hydrochlorothiazide were held.  Plan is for DC to SNF.  She was started on Depakote for mood stabilization.  Her Coumadin was held due to elevated INR and risk versus benefit.  Patient will be discharged to SNF with 2 L nasal cannula.  The outpatient palliative care navigator program will follow patient.   Patient finished course of antibiotics for her pneumonia while inpatient, we are awaiting SNF placement.  She will be discharged on Depakote 125 mg oral twice a day for her mood stabilization.  Speech therapy recommends regular diet with thin liquids.     DC instructions provided to the patient:  Please see primary care doctor in 1 week  Your home Lasix, losartan and hydrochlorothiazide were held due to DALTON, can discuss with PCP re: restarting pending on renal function and BP  Discussed with PCP regarding home med list, at your age you may not need to be on a statin  Your home Coumadin has been held/discontinued due to elevated INR and risk versus benefit     AWAITING SNF PLACEMENT 9/23/24       I spent 25 minutes in the professional and overall care of this patient.      Marisela Arteaga, DO

## 2024-09-23 NOTE — PROGRESS NOTES
Occupational Therapy    OT Treatment    Patient Name: Shona Everett  MRN: [id number]  Today's Date: 9/23/2024  Time Calculation  Start Time: 1113  Stop Time: 1144  Time Calculation (min): 31 min       3114/3114-A    Assessment:  End of Session Communication: Bedside nurse  End of Session Patient Position: Up in chair, Alarm on       Plan:  OT Frequency: 3 times per week  OT Discharge Recommendations: Moderate intensity level of continued care  OT Recommended Transfer Status: Assist of 1     Subjective      09/23/24 1113   OT Last Visit   OT Received On 09/23/24   General   Referred By Nazario Flannery   Past Medical History Relevant to Rehab dementia, Afib, PVD, CABG   Prior to Session Communication Bedside nurse   Patient Position Received Up in chair;Alarm on   General Comment Pt is pleasantly confused, cooperative and agreeable to tx.   Precautions   Medical Precautions Fall precautions;Oxygen therapy device and L/min   Vital Signs   Vitals Session During OT   Heart Rate   ()   SpO2 98 %   /80   BP Location Left arm   BP Method Automatic   Vital Signs Comment following standing at sink   Pain Assessment   0-10 (Numeric) Pain Score 0 - No pain   Cognition   Orientation Level Disoriented to situation;Disoriented to time;Disoriented to place  (reports being in [place]- is able to choose hospital with list of options provided; pt is able to recall current month but reports year as 1950's) reoriented to correct city and year   Following Commands Follows multistep commands without difficulty   Impulsive Mildly   Grooming   Grooming Level of Assistance Minimum assistance;Setup   Grooming Where Assessed Standing sinkside;Recliner   Grooming Comments Pt began combing hair in stance, limited standing tolerance at this time and required seated rest following ~30 second stand. Min A provided for hair cair including use of shampoo cap and thorough combing. Pt then completed face washing, oral care while seated in  recliner with setup and occasional cue.   Functional Standing Tolerance   Time 30 seconds to 1 minute standing intervals   Activity grooming, static/dynamic standing   Functional Standing Tolerance Comments CGA, cues for upright stance   Functional Mobility   Functional Mobility Performed   Pt ambulated from chair>sink at fww level with CGA overall, however cues for proper approach to seated surface, occasional hands-on assist for AD management, this DONAHUE managed O2 tubing and chair follow.   Transfer 1   Technique 1 Sit to stand;Stand to sit   Transfer Device 1 Gait belt;Walker   Transfer Level of Assistance 1 Contact guard;Minimal verbal cues;Minimal tactile cues   Trials/Comments 1 STS functional transers from recliner level x3 trials including x2 consecutive with v/t cues for proper UE/LE placement with fair follow through.   Dynamic Sitting Balance   Dynamic Sitting-Balance Support No upper extremity supported;Feet supported   Dynamic Sitting-Level of Assistance Distant supervision   Dynamic Sitting-Balance Forward lean;Reaching for objects;Reaching across midline   Static Standing Balance   Static Standing-Balance Support Bilateral upper extremity supported   Static Standing-Level of Assistance Contact guard   Static Standing-Comment/Number of Minutes cues for upright stance   Dynamic Standing Balance   Dynamic Standing-Balance Support   (varying UE support)   Dynamic Standing-Level of Assistance Contact guard;Minimum assistance   Dynamic Standing-Balance Reaching for objects;Reaching across midline;Turning   Dynamic Standing-Comments Pt engaged in dynamic standing activity at fww level, incorporating UE functional reaching through various planes, unilateral UE support at all times. Pt demo'd 1 minute stance at longest interval prior to needing rest break.   IP OT Assessment   End of Session Communication Bedside nurse   End of Session Patient Position Up in chair;Alarm on   Inpatient Plan   OT Frequency 3  times per week   OT Discharge Recommendations Moderate intensity level of continued care   OT Recommended Transfer Status Assist of 1     Outcome Measures:[facility] Daily Activity  Putting on and taking off regular lower body clothing: A lot  Bathing (including washing, rinsing, drying): A lot  Putting on and taking off regular upper body clothing: A little  Toileting, which includes using toilet, bedpan or urinal: A lot  Taking care of personal grooming such as brushing teeth: A little  Eating Meals: A little  Daily Activity - Total Score: 15  Education Documentation  No documentation found.  Education Comments  No comments found.            Goals:  Encounter Problems       Encounter Problems (Active)       OT Goals       OT Goal 1 (Progressing)       Start:  09/18/24    Expected End:  10/02/24       Pt will complete all bed mobility with supervision safely            OT Goal 2 (Progressing)       Start:  09/18/24    Expected End:  10/02/24       Pt with complete all transfers safely with SBA           OT Goal 3 (Progressing)       Start:  09/18/24    Expected End:  10/02/24       Pt will complete ADL's and mobility with good sit balance and fair+ stand balance            OT Goal 4 (Progressing)       Start:  09/18/24    Expected End:  10/02/24       Pt will complete LB dressing with CGA using adaptive device as needed           OT Goal 5 (Progressing)       Start:  09/18/24    Expected End:  10/02/24       Pt will complete grooming ADL's with SBA and fair+ stand balance

## 2024-09-23 NOTE — CARE PLAN
The patient's goals for the shift include comfort, sleep    The clinical goals for the shift include pt will get at least 4 hrs sleep this shift      Problem: Infection related to problem list condition  Goal: Infection will resolve through treatment  Outcome: Progressing

## 2024-09-23 NOTE — CARE PLAN
The patient's goals for the shift include comfort, sleep    The clinical goals for the shift include pt will get at least 4 hrs sleep this shift    Over the shift, the patient did  make progress toward the following goals.  Pt slept on and off during shift, fell back to sleep easily after waking for care.  Safety measures maintained.  A/o x2.  No new compliations

## 2024-09-23 NOTE — PROGRESS NOTES
Physical Therapy    Physical Therapy Treatment    Patient Name: Shona Everett  MRN: [id number]  Today's Date: 9/23/2024  Time Calculation  Start Time: 1035  Stop Time: 1105  Time Calculation (min): 30 min     3114/3114-A       09/23/24 1035   PT  Visit   PT Received On 09/23/24   Response to Previous Treatment Patient with no complaints from previous session.   General   Reason for Referral impaired mobility   Referred By Nazario Flannery   Past Medical History Relevant to Rehab dementia, Afib, PVD, CABG   Prior to Session Communication Bedside nurse   Patient Position Received Up in chair;Alarm on   General Comment Pt. was seated in the chair when I arrived. She was agreeable to PT,. No c/o pain.   Precautions   Medical Precautions Fall precautions;Oxygen therapy device and L/min  (2LNC)   Precautions Comment Bed/Chair Alarm   Vital Signs   Vitals Session   HR  on monitor.   RA SpO2 94%,   SpO2-96-98% 2L.     Cognition   Orientation Level Disoriented to situation;Disoriented to time;Disoriented to place   Dynamic Sitting Balance   Dynamic Sitting-Level of Assistance Close supervision   Therapeutic Exercise   Therapeutic Exercise Performed Yes   Therapeutic Exercise Activity 2 Seated Ex's Heel/Toe Raises, Hip abd/add, Hip Flexion, LAQ, 2x10, Hip adduction iosmetric 1x15   Therapeutic Activity   Therapeutic Activity Performed Yes   Therapeutic Activity 1 Sit to stand from the chair x5.   Static standing to tolerance each attemps.   Pt. c/o fatigue in standing.   Ambulation/Gait Training   Ambulation/Gait Training Performed Yes   Ambulation/Gait Training 1   Surface 1 Level tile   Device 1 Rolling walker   Gait Support Devices Gait belt   Assistance 1 Contact guard;Minimal tactile cues  (Directional cues.)   Quality of Gait 1 Inconsistent stride length;Diminished heel strike;WBOS   Comments/Distance (ft) 1 Pt ambulated 25'x4 with the wheeled walker. standing rest breaks.     Pace was slow,   No loss of balance  O2 2L donned for ambulation. SpO2 after ambulaiton 96-98%   Transfers   Transfer Yes   Transfer 1   Transfer From 1 Sit to   Transfer to 1 Stand   Technique 1 Sit to stand;Stand to sit   Transfer Device 1 Walker;Gait belt   Transfer Level of Assistance 1 Contact guard   Trials/Comments 1 cue for hand placement for for sit to stand,   Activity Tolerance   Endurance Tolerates 30 min exercise with multiple rests   PT Assessment   PT Assessment Results Decreased strength;Decreased range of motion;Decreased endurance;Impaired balance;Decreased mobility;Decreased cognition;Impaired judgement;Decreased safety awareness   Rehab Prognosis Fair   End of Session Communication Bedside nurse   Assessment Comment Pt, mobility limited but legs feeling weak. No loss of balance. Decreased safety awarenss.   End of Session Patient Position Up in chair;Alarm on   PT Plan   PT Plan Ongoing PT   PT Frequency 3 times per week   PT Discharge Recommendations Moderate intensity level of continued care   Equipment Recommended upon Discharge Wheeled walker       Assessment/Plan   PT Assessment  PT Assessment Results: Decreased strength, Decreased range of motion, Decreased endurance, Impaired balance, Decreased mobility, Decreased cognition, Impaired judgement, Decreased safety awareness  Rehab Prognosis: Fair  End of Session Communication: Bedside nurse  Assessment Comment: Pt, mobility limited but legs feeling weak. No loss of balance. Decreased safety awarenss.  End of Session Patient Position: Up in chair, Alarm on     PT Plan  Treatment/Interventions: Bed mobility, Transfer training, Gait training, Balance training, Neuromuscular re-education, Strengthening, Endurance training, Therapeutic exercise, Range of motion, Therapeutic activity, Home exercise program  PT Plan: Ongoing PT  PT Frequency: 3 times per week  PT Discharge Recommendations: Moderate intensity level of continued care  Equipment Recommended upon Discharge: Wheeled  walker  PT Recommended Transfer Status: Assist x1 (Min A)  PT - OK to Discharge: Yes    Outcome Measures:  [facility] Basic Mobility  Turning from your back to your side while in a flat bed without using bedrails: A little  Moving from lying on your back to sitting on the side of a flat bed without using bedrails: A little  Moving to and from bed to chair (including a wheelchair): A little  Standing up from a chair using your arms (e.g. wheelchair or bedside chair): A little  To walk in hospital room: A little  Climbing 3-5 steps with railing: A lot  Basic Mobility - Total Score: 17                             EDUCATION:     Education Documentation  Body Mechanics, taught by David Be PTA at 9/23/2024 11:23 AM.  Learner: Patient  Readiness: Acceptance  Method: Explanation, Demonstration  Response: Needs Reinforcement    Home Exercise Program, taught by David Be PTA at 9/23/2024 11:23 AM.  Learner: Patient  Readiness: Acceptance  Method: Explanation, Demonstration  Response: Needs Reinforcement    Mobility Training, taught by David Be PTA at 9/23/2024 11:23 AM.  Learner: Patient  Readiness: Acceptance  Method: Explanation, Demonstration  Response: Needs Reinforcement    Education Comments  No comments found.        GOALS:  Encounter Problems       Encounter Problems (Active)       PT Problem       Pt will be able to perform all bed mobility tasks with SBA.  (Progressing)       Start:  09/18/24    Expected End:  10/02/24            Pt will perform all transfers with SBA and FWW with proper safety mechanics.   (Progressing)       Start:  09/18/24    Expected End:  10/02/24            Pt will ambulate 75 ft with SBA using FWW for improved functional independence.  (Progressing)       Start:  09/18/24    Expected End:  10/02/24            Pt will be able to ambulate at least 75 ft with < or equal to 1 rest break while maintaining SpO2 > 90%.  (Progressing)       Start:  09/18/24    Expected End:  10/02/24                Pain - Adult

## 2024-09-23 NOTE — PROGRESS NOTES
09/23/24 0823   Discharge Planning   Expected Discharge Disposition SNF   Does the patient need discharge transport arranged? Yes   RoundTrip coordination needed? Yes   Has discharge transport been arranged? No     Inquiry sent through careport to facility with an update on pre cert status.    2 = assistive person

## 2024-09-23 NOTE — CARE PLAN
Problem: Skin  Goal: Participates in plan/prevention/treatment measures  9/23/2024 1130 by Frandy Stafford RN  Outcome: Progressing  9/23/2024 0747 by Frandy Stafford RN  Flowsheets (Taken 9/22/2024 0820 by Johan Wilkes RN)  Participates in plan/prevention/treatment measures: Increase activity/out of bed for meals  9/23/2024 0746 by Frandy Stafford RN  Outcome: Progressing   The patient's goals for the shift include comfort, sleep    The clinical goals for the shift include Pt will have improved lung function by the end of shift

## 2024-09-23 NOTE — CARE PLAN
Problem: Skin  Goal: Participates in plan/prevention/treatment measures  9/23/2024 1131 by Frandy Stafford RN  Flowsheets (Taken 9/22/2024 0820 by Johan Wilkes, RN)  Participates in plan/prevention/treatment measures: Increase activity/out of bed for meals  9/23/2024 1130 by Frandy Stafford RN  Outcome: Progressing  9/23/2024 0747 by Frandy Stafford RN  Flowsheets (Taken 9/22/2024 0820 by Johan Wilkes, RN)  Participates in plan/prevention/treatment measures: Increase activity/out of bed for meals  9/23/2024 0746 by Frandy Stafford RN  Outcome: Progressing   The patient's goals for the shift include comfort, sleep    The clinical goals for the shift include Pt will have improved lung function by the end of shift    O

## 2024-09-24 VITALS
DIASTOLIC BLOOD PRESSURE: 80 MMHG | WEIGHT: 163.14 LBS | RESPIRATION RATE: 15 BRPM | HEART RATE: 105 BPM | SYSTOLIC BLOOD PRESSURE: 136 MMHG | BODY MASS INDEX: 27.18 KG/M2 | TEMPERATURE: 96.8 F | OXYGEN SATURATION: 99 % | HEIGHT: 65 IN

## 2024-09-24 DIAGNOSIS — I48.0 PAROXYSMAL ATRIAL FIBRILLATION (MULTI): ICD-10-CM

## 2024-09-24 PROCEDURE — 2500000001 HC RX 250 WO HCPCS SELF ADMINISTERED DRUGS (ALT 637 FOR MEDICARE OP): Performed by: INTERNAL MEDICINE

## 2024-09-24 PROCEDURE — 2500000002 HC RX 250 W HCPCS SELF ADMINISTERED DRUGS (ALT 637 FOR MEDICARE OP, ALT 636 FOR OP/ED): Performed by: INTERNAL MEDICINE

## 2024-09-24 PROCEDURE — 2500000005 HC RX 250 GENERAL PHARMACY W/O HCPCS: Performed by: INTERNAL MEDICINE

## 2024-09-24 ASSESSMENT — COGNITIVE AND FUNCTIONAL STATUS - GENERAL
DRESSING REGULAR UPPER BODY CLOTHING: A LITTLE
DAILY ACTIVITIY SCORE: 15
TURNING FROM BACK TO SIDE WHILE IN FLAT BAD: A LITTLE
DRESSING REGULAR LOWER BODY CLOTHING: A LOT
TOILETING: A LOT
PERSONAL GROOMING: A LOT
EATING MEALS: A LITTLE
STANDING UP FROM CHAIR USING ARMS: A LOT
CLIMB 3 TO 5 STEPS WITH RAILING: A LOT
MOBILITY SCORE: 15
MOVING TO AND FROM BED TO CHAIR: A LOT
WALKING IN HOSPITAL ROOM: A LOT
HELP NEEDED FOR BATHING: A LITTLE

## 2024-09-24 ASSESSMENT — PAIN SCALES - GENERAL: PAINLEVEL_OUTOF10: 0 - NO PAIN

## 2024-09-24 NOTE — PROGRESS NOTES
09/24/24 0822   Discharge Planning   Expected Discharge Disposition SNF   Does the patient need discharge transport arranged? Yes   RoundTrip coordination needed? Yes   Has discharge transport been arranged? No   What day is the transport expected? 09/24/24     Verified with Marissa Lewis by phone patient has insurance authorization for [facility] and they can accept this am. Message to [facility]sc to arrange for stretcher for safety   Flight risk due to dementia with 3 LNC and to submit the 7000.     1030: I notified son jj by phone with the transport time and facility.

## 2024-09-24 NOTE — CARE PLAN
The patient's goals for the shift include comfort, sleep    The clinical goals for the shift include will remain free from injury    Safety maintained, no injury thru the end of shift.        Problem: Skin  Goal: Promote/optimize nutrition  Outcome: Progressing     Problem: Discharge Planning  Goal: Discharge to home or other facility with appropriate resources  Outcome: Progressing

## 2024-09-24 NOTE — CARE PLAN
The patient's goals for the shift include comfort, sleep    The clinical goals for the shift include will remain free from injury      Problem: Discharge Planning  Goal: Discharge to home or other facility with appropriate resources  Outcome: Progressing     Problem: Safety - Adult  Goal: Free from fall injury  Outcome: Progressing

## 2024-09-24 NOTE — CARE PLAN
The patient's goals for the shift include comfort, sleep    The clinical goals for the shift include will remain free from injury      Problem: Skin  Goal: Participates in plan/prevention/treatment measures  9/24/2024 1028 by Frandy Stafford RN  Flowsheets (Taken 9/24/2024 1028)  Participates in plan/prevention/treatment measures:   Discuss with provider PT/OT consult   Increase activity/out of bed for meals   Elevate heels  9/24/2024 1028 by Frandy Stafford RN  Outcome: Progressing  Flowsheets (Taken 9/24/2024 1028)  Participates in plan/prevention/treatment measures:   Discuss with provider PT/OT consult   Increase activity/out of bed for meals   Elevate heels

## 2024-09-24 NOTE — PROGRESS NOTES
Modifying standing INR order from [facility] provider for [facility].   Pending for provider signature.

## 2024-09-25 ENCOUNTER — NURSING HOME VISIT (OUTPATIENT)
Dept: POST ACUTE CARE | Facility: EXTERNAL LOCATION | Age: 89
End: 2024-09-25
Payer: COMMERCIAL

## 2024-09-25 DIAGNOSIS — J18.9 PNEUMONIA OF RIGHT LUNG DUE TO INFECTIOUS ORGANISM, UNSPECIFIED PART OF LUNG: Primary | ICD-10-CM

## 2024-09-25 DIAGNOSIS — F02.80 DEMENTIA OF THE ALZHEIMER'S TYPE WITH LATE ONSET WITHOUT BEHAVIORAL DISTURBANCE (MULTI): ICD-10-CM

## 2024-09-25 DIAGNOSIS — Z95.2 S/P AVR: ICD-10-CM

## 2024-09-25 DIAGNOSIS — G30.1 DEMENTIA OF THE ALZHEIMER'S TYPE WITH LATE ONSET WITHOUT BEHAVIORAL DISTURBANCE (MULTI): ICD-10-CM

## 2024-09-25 DIAGNOSIS — D68.9 COAGULOPATHY (MULTI): ICD-10-CM

## 2024-09-25 DIAGNOSIS — I48.20 ATRIAL FIBRILLATION, CHRONIC (MULTI): ICD-10-CM

## 2024-09-25 DIAGNOSIS — I10 ESSENTIAL HYPERTENSION: ICD-10-CM

## 2024-09-25 PROCEDURE — 99306 1ST NF CARE HIGH MDM 50: CPT | Performed by: INTERNAL MEDICINE

## 2024-09-25 NOTE — LETTER
Patient: Shona Everett  : 1935    Encounter Date: 2024    Subjective  Patient ID: Shona Everett is a 89 y.o. female who is acute skilled care and presents for initial visit for skilled nursing.    Pt was seen after being admitted recently for altered sensorium and weakness, pt has AVR and has been on chronic warfarin, INR was abnormal, needed to be reversed, treated with IV Abx, documentation not very clear for the reason for stopping warfarin, states risk v/s benefit, Pt has mechanical prosthetic Ao valve and AF, pt has cognitive dysfunction, pt has been doing poorly with IADLs and ADLs, Pt has Hx of Hypothyroidism and chronic depression, they started pt on depakote for behavioural changes, discharged without warfarin, today when I see pt, she is asleep, she woke up, confused but not cobative, pt is admitted here for skilled nursing and rehabilitation. Hospital data nd information reviewed, nursing staff tried to call family about warfarin but could not get proper answer about cessation, they seems to be interested in palliative care?         Review of Systems   Constitutional:  Positive for activity change and fatigue.   HENT:  Positive for hearing loss.    Respiratory:  Negative for apnea, cough and shortness of breath.    Cardiovascular:  Positive for leg swelling.   Gastrointestinal:  Negative for abdominal pain and nausea.   Musculoskeletal:  Positive for arthralgias.   Skin:  Positive for color change.   Neurological:  Positive for weakness.   Psychiatric/Behavioral:  Positive for confusion.        Objective  /67   Pulse 83     Physical Exam  Constitutional:       Appearance: Normal appearance. She is normal weight.   HENT:      Head: Normocephalic.      Nose: Nose normal.   Eyes:      Conjunctiva/sclera: Conjunctivae normal.   Cardiovascular:      Rate and Rhythm: Normal rate. Rhythm irregular.      Heart sounds: Normal heart sounds.      Comments: Prosthetic click  Pulmonary:       Effort: Pulmonary effort is normal.      Breath sounds: Rales present.   Abdominal:      Palpations: Abdomen is soft.   Musculoskeletal:         General: Deformity present. No tenderness.      Cervical back: Neck supple.   Skin:     General: Skin is warm and dry.      Findings: Bruising present.   Neurological:      General: No focal deficit present.      Mental Status: Mental status is at baseline. She is disoriented.   Psychiatric:         Behavior: Behavior normal.         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 INR three times a week. No further antibiotics? Rx list incl depakote, amlodipine,Boniva which will be stopped, levothyroxine, sertraline and toprol XL. , Rx list reviewed. PT and OT evaluation is in the process. Routine safety measures, fall precautions, risk modification and alarm placement if needed for prevention of falls. Skin care precautions, prevention of pressures sores at pressure points assessed. Pt needs to be monitored frequently by nursing staff particularly at night time. Any confusion, agitation or behavioural disturbance needs to be attended, as per home policy rapid covid Ag assay need to be done, notify if positive. If needed appropriate measures to be taken for alarm placements and assisted devices, pt was told not to get up and ambulate at night unless help and assist available at bedside, labs will be done as per our routine protocol. PO intake need to be monitored . Lets wait for few days, if needed will talk with family. In controlled rate now.      Goals    None           Electronically Signed  By: Lb Rogers MD   9/26/24  9:44 PM

## 2024-09-26 VITALS — DIASTOLIC BLOOD PRESSURE: 67 MMHG | SYSTOLIC BLOOD PRESSURE: 134 MMHG | HEART RATE: 83 BPM

## 2024-09-26 PROBLEM — I48.20 ATRIAL FIBRILLATION, CHRONIC (MULTI): Status: ACTIVE | Noted: 2024-09-16

## 2024-09-26 ASSESSMENT — ENCOUNTER SYMPTOMS
COLOR CHANGE: 1
WEAKNESS: 1
NAUSEA: 0
ABDOMINAL PAIN: 0
APNEA: 0
CONFUSION: 1
ACTIVITY CHANGE: 1
ARTHRALGIAS: 1
COUGH: 0
SHORTNESS OF BREATH: 0
FATIGUE: 1

## 2024-09-27 ENCOUNTER — APPOINTMENT (OUTPATIENT)
Dept: RADIOLOGY | Facility: HOSPITAL | Age: 89
DRG: 871 | End: 2024-09-27
Payer: COMMERCIAL

## 2024-09-27 ENCOUNTER — HOSPITAL ENCOUNTER (INPATIENT)
Facility: HOSPITAL | Age: 89
LOS: 1 days | Discharge: HOSPICE | DRG: 871 | End: 2024-09-28
Attending: INTERNAL MEDICINE | Admitting: SURGERY
Payer: COMMERCIAL

## 2024-09-27 ENCOUNTER — APPOINTMENT (OUTPATIENT)
Dept: CARDIOLOGY | Facility: HOSPITAL | Age: 89
End: 2024-09-27
Payer: COMMERCIAL

## 2024-09-27 DIAGNOSIS — J96.01 ACUTE RESPIRATORY FAILURE WITH HYPOXIA (MULTI): Primary | ICD-10-CM

## 2024-09-27 DIAGNOSIS — I48.91 ATRIAL FIBRILLATION, UNSPECIFIED TYPE (MULTI): ICD-10-CM

## 2024-09-27 DIAGNOSIS — A41.9 SEPSIS, DUE TO UNSPECIFIED ORGANISM, UNSPECIFIED WHETHER ACUTE ORGAN DYSFUNCTION PRESENT (MULTI): ICD-10-CM

## 2024-09-27 DIAGNOSIS — R41.82 ALTERED MENTAL STATUS, UNSPECIFIED ALTERED MENTAL STATUS TYPE: ICD-10-CM

## 2024-09-27 DIAGNOSIS — J18.9 PNEUMONIA DUE TO INFECTIOUS ORGANISM, UNSPECIFIED LATERALITY, UNSPECIFIED PART OF LUNG: ICD-10-CM

## 2024-09-27 LAB
ABO GROUP (TYPE) IN BLOOD: NORMAL
ALBUMIN SERPL BCP-MCNC: 3.5 G/DL (ref 3.4–5)
ALP SERPL-CCNC: 77 U/L (ref 33–136)
ALT SERPL W P-5'-P-CCNC: 18 U/L (ref 7–45)
ANION GAP BLDA CALCULATED.4IONS-SCNC: 11 MMO/L (ref 10–25)
ANION GAP BLDV CALCULATED.4IONS-SCNC: ABNORMAL MMOL/L
ANION GAP SERPL CALC-SCNC: 14 MMOL/L (ref 10–20)
ANTIBODY SCREEN: NORMAL
APPARATUS: ABNORMAL
APPEARANCE UR: CLEAR
APTT PPP: 34 SECONDS (ref 27–38)
ARTERIAL PATENCY WRIST A: NEGATIVE
AST SERPL W P-5'-P-CCNC: 21 U/L (ref 9–39)
BASE EXCESS BLDA CALC-SCNC: 3.2 MMOL/L (ref -2–3)
BASE EXCESS BLDV CALC-SCNC: -17.5 MMOL/L (ref -2–3)
BASOPHILS # BLD AUTO: 0.12 X10*3/UL (ref 0–0.1)
BASOPHILS NFR BLD AUTO: 0.4 %
BILIRUB SERPL-MCNC: 0.8 MG/DL (ref 0–1.2)
BILIRUB UR STRIP.AUTO-MCNC: NEGATIVE MG/DL
BODY TEMPERATURE: ABNORMAL
BODY TEMPERATURE: ABNORMAL
BUN SERPL-MCNC: 15 MG/DL (ref 6–23)
CA-I BLDA-SCNC: 1.15 MMOL/L (ref 1.1–1.33)
CA-I BLDV-SCNC: 0.71 MMOL/L (ref 1.1–1.33)
CALCIUM SERPL-MCNC: 9 MG/DL (ref 8.6–10.3)
CHLORIDE BLDA-SCNC: 99 MMOL/L (ref 98–107)
CHLORIDE BLDV-SCNC: 78 MMOL/L (ref 98–107)
CHLORIDE SERPL-SCNC: 101 MMOL/L (ref 98–107)
CO2 SERPL-SCNC: 26 MMOL/L (ref 21–32)
COLOR UR: NORMAL
CREAT SERPL-MCNC: 0.79 MG/DL (ref 0.5–1.05)
CRITICAL CALL TIME: 1006
CRITICAL CALL TIME: 446
CRITICAL CALLED BY: ABNORMAL
CRITICAL CALLED BY: ABNORMAL
CRITICAL CALLED TO: ABNORMAL
CRITICAL CALLED TO: ABNORMAL
CRITICAL READ BACK: ABNORMAL
CRITICAL READ BACK: ABNORMAL
EGFRCR SERPLBLD CKD-EPI 2021: 72 ML/MIN/1.73M*2
EOSINOPHIL # BLD AUTO: 0 X10*3/UL (ref 0–0.4)
EOSINOPHIL NFR BLD AUTO: 0 %
EPAP CMH2O: 8 CM H2O
ERYTHROCYTE [DISTWIDTH] IN BLOOD BY AUTOMATED COUNT: 16.8 % (ref 11.5–14.5)
FLUAV RNA RESP QL NAA+PROBE: NOT DETECTED
FLUBV RNA RESP QL NAA+PROBE: NOT DETECTED
GLUCOSE BLD MANUAL STRIP-MCNC: 135 MG/DL (ref 74–99)
GLUCOSE BLD MANUAL STRIP-MCNC: 162 MG/DL (ref 74–99)
GLUCOSE BLD MANUAL STRIP-MCNC: 195 MG/DL (ref 74–99)
GLUCOSE BLD MANUAL STRIP-MCNC: 217 MG/DL (ref 74–99)
GLUCOSE BLDA-MCNC: 163 MG/DL (ref 74–99)
GLUCOSE BLDV-MCNC: ABNORMAL MG/DL
GLUCOSE SERPL-MCNC: 214 MG/DL (ref 74–99)
GLUCOSE UR STRIP.AUTO-MCNC: NORMAL MG/DL
HCO3 BLDA-SCNC: 26.9 MMOL/L (ref 22–26)
HCO3 BLDV-SCNC: 10.6 MMOL/L (ref 22–26)
HCT VFR BLD AUTO: 36 % (ref 36–46)
HCT VFR BLD EST: 19 % (ref 36–46)
HCT VFR BLD EST: 38 % (ref 36–46)
HGB BLD-MCNC: 11.7 G/DL (ref 12–16)
HGB BLDA-MCNC: 12.7 G/DL (ref 12–16)
HGB BLDV-MCNC: 6.3 G/DL (ref 12–16)
HOLD SPECIMEN: NORMAL
IMM GRANULOCYTES # BLD AUTO: 0.63 X10*3/UL (ref 0–0.5)
IMM GRANULOCYTES NFR BLD AUTO: 2.1 % (ref 0–0.9)
INHALED O2 CONCENTRATION: 100 %
INHALED O2 CONCENTRATION: 100 %
INR PPP: 2 (ref 0.9–1.1)
IPAP CMH2O: 20 CM H2O
KETONES UR STRIP.AUTO-MCNC: NEGATIVE MG/DL
LACTATE BLDA-SCNC: 4.2 MMOL/L (ref 0.4–2)
LACTATE BLDV-SCNC: 1.5 MMOL/L (ref 0.4–2)
LACTATE SERPL-SCNC: 3.2 MMOL/L (ref 0.4–2)
LACTATE SERPL-SCNC: 3.2 MMOL/L (ref 0.4–2)
LACTATE SERPL-SCNC: 5.2 MMOL/L (ref 0.4–2)
LEUKOCYTE ESTERASE UR QL STRIP.AUTO: NEGATIVE
LIPASE SERPL-CCNC: 20 U/L (ref 9–82)
LYMPHOCYTES # BLD AUTO: 1.39 X10*3/UL (ref 0.8–3)
LYMPHOCYTES NFR BLD AUTO: 4.7 %
MAGNESIUM SERPL-MCNC: 1.62 MG/DL (ref 1.6–2.4)
MCH RBC QN AUTO: 28.8 PG (ref 26–34)
MCHC RBC AUTO-ENTMCNC: 32.5 G/DL (ref 32–36)
MCV RBC AUTO: 89 FL (ref 80–100)
MONOCYTES # BLD AUTO: 0.92 X10*3/UL (ref 0.05–0.8)
MONOCYTES NFR BLD AUTO: 3.1 %
MRSA DNA SPEC QL NAA+PROBE: NOT DETECTED
NEUTROPHILS # BLD AUTO: 26.52 X10*3/UL (ref 1.6–5.5)
NEUTROPHILS NFR BLD AUTO: 89.7 %
NITRITE UR QL STRIP.AUTO: NEGATIVE
NRBC BLD-RTO: 0 /100 WBCS (ref 0–0)
OXYHGB MFR BLDA: 97.7 % (ref 94–98)
OXYHGB MFR BLDV: 87.5 % (ref 45–75)
PCO2 BLDA: 37 MM HG (ref 38–42)
PCO2 BLDV: 34 MM HG (ref 41–51)
PH BLDA: 7.47 PH (ref 7.38–7.42)
PH BLDV: 7.1 PH (ref 7.33–7.43)
PH UR STRIP.AUTO: 5.5 [PH]
PLATELET # BLD AUTO: 317 X10*3/UL (ref 150–450)
PO2 BLDA: 140 MM HG (ref 85–95)
PO2 BLDV: 22 MM HG (ref 35–45)
POTASSIUM BLDA-SCNC: 4.2 MMOL/L (ref 3.5–5.3)
POTASSIUM BLDV-SCNC: 2.1 MMOL/L (ref 3.5–5.3)
POTASSIUM SERPL-SCNC: 4 MMOL/L (ref 3.5–5.3)
PROT SERPL-MCNC: 6.9 G/DL (ref 6.4–8.2)
PROT UR STRIP.AUTO-MCNC: NEGATIVE MG/DL
PROTHROMBIN TIME: 22.5 SECONDS (ref 9.8–12.8)
RBC # BLD AUTO: 4.06 X10*6/UL (ref 4–5.2)
RBC # UR STRIP.AUTO: NEGATIVE /UL
RH FACTOR (ANTIGEN D): NORMAL
SAO2 % BLDA: 100 % (ref 94–100)
SAO2 % BLDV: 90 % (ref 45–75)
SARS-COV-2 RNA RESP QL NAA+PROBE: NOT DETECTED
SODIUM BLDA-SCNC: 133 MMOL/L (ref 136–145)
SODIUM BLDV-SCNC: <100 MMOL/L (ref 136–145)
SODIUM SERPL-SCNC: 137 MMOL/L (ref 136–145)
SP GR UR STRIP.AUTO: 1.01
SPECIMEN DRAWN FROM PATIENT: ABNORMAL
UFH PPP CHRO-ACNC: 0.2 IU/ML
UROBILINOGEN UR STRIP.AUTO-MCNC: NORMAL MG/DL
VENTILATOR MODE: ABNORMAL
VENTILATOR RATE: 18 BPM
WBC # BLD AUTO: 29.6 X10*3/UL (ref 4.4–11.3)

## 2024-09-27 PROCEDURE — 5A0935A ASSISTANCE WITH RESPIRATORY VENTILATION, LESS THAN 24 CONSECUTIVE HOURS, HIGH NASAL FLOW/VELOCITY: ICD-10-PCS | Performed by: SURGERY

## 2024-09-27 PROCEDURE — 87641 MR-STAPH DNA AMP PROBE: CPT | Performed by: INTERNAL MEDICINE

## 2024-09-27 PROCEDURE — 36415 COLL VENOUS BLD VENIPUNCTURE: CPT | Performed by: INTERNAL MEDICINE

## 2024-09-27 PROCEDURE — 2500000004 HC RX 250 GENERAL PHARMACY W/ HCPCS (ALT 636 FOR OP/ED)

## 2024-09-27 PROCEDURE — 87040 BLOOD CULTURE FOR BACTERIA: CPT | Mod: ELYLAB | Performed by: INTERNAL MEDICINE

## 2024-09-27 PROCEDURE — 84132 ASSAY OF SERUM POTASSIUM: CPT | Performed by: NURSE PRACTITIONER

## 2024-09-27 PROCEDURE — 94660 CPAP INITIATION&MGMT: CPT

## 2024-09-27 PROCEDURE — 83690 ASSAY OF LIPASE: CPT | Performed by: INTERNAL MEDICINE

## 2024-09-27 PROCEDURE — 2500000004 HC RX 250 GENERAL PHARMACY W/ HCPCS (ALT 636 FOR OP/ED): Performed by: NURSE PRACTITIONER

## 2024-09-27 PROCEDURE — 2500000002 HC RX 250 W HCPCS SELF ADMINISTERED DRUGS (ALT 637 FOR MEDICARE OP, ALT 636 FOR OP/ED): Performed by: NURSE PRACTITIONER

## 2024-09-27 PROCEDURE — 87449 NOS EACH ORGANISM AG IA: CPT | Mod: ELYLAB | Performed by: NURSE PRACTITIONER

## 2024-09-27 PROCEDURE — 70450 CT HEAD/BRAIN W/O DYE: CPT

## 2024-09-27 PROCEDURE — 86901 BLOOD TYPING SEROLOGIC RH(D): CPT | Performed by: INTERNAL MEDICINE

## 2024-09-27 PROCEDURE — 85610 PROTHROMBIN TIME: CPT | Performed by: INTERNAL MEDICINE

## 2024-09-27 PROCEDURE — 36415 COLL VENOUS BLD VENIPUNCTURE: CPT | Performed by: NURSE PRACTITIONER

## 2024-09-27 PROCEDURE — 83605 ASSAY OF LACTIC ACID: CPT | Performed by: INTERNAL MEDICINE

## 2024-09-27 PROCEDURE — 2500000002 HC RX 250 W HCPCS SELF ADMINISTERED DRUGS (ALT 637 FOR MEDICARE OP, ALT 636 FOR OP/ED): Performed by: INTERNAL MEDICINE

## 2024-09-27 PROCEDURE — 99291 CRITICAL CARE FIRST HOUR: CPT | Performed by: NURSE PRACTITIONER

## 2024-09-27 PROCEDURE — 82947 ASSAY GLUCOSE BLOOD QUANT: CPT

## 2024-09-27 PROCEDURE — 80053 COMPREHEN METABOLIC PANEL: CPT | Performed by: INTERNAL MEDICINE

## 2024-09-27 PROCEDURE — 85025 COMPLETE CBC W/AUTO DIFF WBC: CPT | Performed by: INTERNAL MEDICINE

## 2024-09-27 PROCEDURE — 83735 ASSAY OF MAGNESIUM: CPT | Performed by: INTERNAL MEDICINE

## 2024-09-27 PROCEDURE — 87636 SARSCOV2 & INF A&B AMP PRB: CPT | Performed by: INTERNAL MEDICINE

## 2024-09-27 PROCEDURE — 71045 X-RAY EXAM CHEST 1 VIEW: CPT | Mod: FOREIGN READ | Performed by: RADIOLOGY

## 2024-09-27 PROCEDURE — 85520 HEPARIN ASSAY: CPT | Performed by: SURGERY

## 2024-09-27 PROCEDURE — 70450 CT HEAD/BRAIN W/O DYE: CPT | Performed by: RADIOLOGY

## 2024-09-27 PROCEDURE — 84132 ASSAY OF SERUM POTASSIUM: CPT | Performed by: INTERNAL MEDICINE

## 2024-09-27 PROCEDURE — 2500000005 HC RX 250 GENERAL PHARMACY W/O HCPCS: Performed by: NURSE PRACTITIONER

## 2024-09-27 PROCEDURE — 71045 X-RAY EXAM CHEST 1 VIEW: CPT

## 2024-09-27 PROCEDURE — 87899 AGENT NOS ASSAY W/OPTIC: CPT | Mod: ELYLAB | Performed by: NURSE PRACTITIONER

## 2024-09-27 PROCEDURE — 99291 CRITICAL CARE FIRST HOUR: CPT | Performed by: INTERNAL MEDICINE

## 2024-09-27 PROCEDURE — 96367 TX/PROPH/DG ADDL SEQ IV INF: CPT

## 2024-09-27 PROCEDURE — 87899 AGENT NOS ASSAY W/OPTIC: CPT | Performed by: NURSE PRACTITIONER

## 2024-09-27 PROCEDURE — 85730 THROMBOPLASTIN TIME PARTIAL: CPT | Performed by: INTERNAL MEDICINE

## 2024-09-27 PROCEDURE — 2020000001 HC ICU ROOM DAILY

## 2024-09-27 PROCEDURE — 94640 AIRWAY INHALATION TREATMENT: CPT

## 2024-09-27 PROCEDURE — 36600 WITHDRAWAL OF ARTERIAL BLOOD: CPT

## 2024-09-27 PROCEDURE — 96365 THER/PROPH/DIAG IV INF INIT: CPT

## 2024-09-27 PROCEDURE — 83605 ASSAY OF LACTIC ACID: CPT | Performed by: NURSE PRACTITIONER

## 2024-09-27 PROCEDURE — 96375 TX/PRO/DX INJ NEW DRUG ADDON: CPT

## 2024-09-27 PROCEDURE — 5A09357 ASSISTANCE WITH RESPIRATORY VENTILATION, LESS THAN 24 CONSECUTIVE HOURS, CONTINUOUS POSITIVE AIRWAY PRESSURE: ICD-10-PCS | Performed by: SURGERY

## 2024-09-27 PROCEDURE — 81003 URINALYSIS AUTO W/O SCOPE: CPT | Performed by: INTERNAL MEDICINE

## 2024-09-27 PROCEDURE — 2500000004 HC RX 250 GENERAL PHARMACY W/ HCPCS (ALT 636 FOR OP/ED): Performed by: INTERNAL MEDICINE

## 2024-09-27 RX ORDER — ATORVASTATIN CALCIUM 20 MG/1
40 TABLET, FILM COATED ORAL NIGHTLY
Status: DISCONTINUED | OUTPATIENT
Start: 2024-09-27 | End: 2024-09-28 | Stop reason: HOSPADM

## 2024-09-27 RX ORDER — DEXMEDETOMIDINE HYDROCHLORIDE 4 UG/ML
0-1.5 INJECTION, SOLUTION INTRAVENOUS CONTINUOUS
Status: DISCONTINUED | OUTPATIENT
Start: 2024-09-27 | End: 2024-09-28

## 2024-09-27 RX ORDER — CYANOCOBALAMIN (VITAMIN B-12) 500 MCG
400 TABLET ORAL DAILY
Status: DISCONTINUED | OUTPATIENT
Start: 2024-09-27 | End: 2024-09-28 | Stop reason: HOSPADM

## 2024-09-27 RX ORDER — PANTOPRAZOLE SODIUM 40 MG/10ML
40 INJECTION, POWDER, LYOPHILIZED, FOR SOLUTION INTRAVENOUS DAILY
Status: CANCELLED | OUTPATIENT
Start: 2024-09-27

## 2024-09-27 RX ORDER — METOPROLOL TARTRATE 50 MG/1
50 TABLET ORAL 2 TIMES DAILY
Status: DISCONTINUED | OUTPATIENT
Start: 2024-09-27 | End: 2024-09-27

## 2024-09-27 RX ORDER — VANCOMYCIN HYDROCHLORIDE 1 G/200ML
1 INJECTION, SOLUTION INTRAVENOUS ONCE
Status: COMPLETED | OUTPATIENT
Start: 2024-09-27 | End: 2024-09-27

## 2024-09-27 RX ORDER — METOPROLOL TARTRATE 1 MG/ML
5 INJECTION, SOLUTION INTRAVENOUS ONCE
Status: COMPLETED | OUTPATIENT
Start: 2024-09-27 | End: 2024-09-27

## 2024-09-27 RX ORDER — MAGNESIUM SULFATE HEPTAHYDRATE 40 MG/ML
4 INJECTION, SOLUTION INTRAVENOUS ONCE
Status: COMPLETED | OUTPATIENT
Start: 2024-09-27 | End: 2024-09-27

## 2024-09-27 RX ORDER — LANOLIN ALCOHOL/MO/W.PET/CERES
400 CREAM (GRAM) TOPICAL DAILY
Status: DISCONTINUED | OUTPATIENT
Start: 2024-09-27 | End: 2024-09-28 | Stop reason: HOSPADM

## 2024-09-27 RX ORDER — METOPROLOL TARTRATE 25 MG/1
25 TABLET, FILM COATED ORAL 2 TIMES DAILY
Status: DISCONTINUED | OUTPATIENT
Start: 2024-09-27 | End: 2024-09-28 | Stop reason: HOSPADM

## 2024-09-27 RX ORDER — IPRATROPIUM BROMIDE AND ALBUTEROL SULFATE 2.5; .5 MG/3ML; MG/3ML
3 SOLUTION RESPIRATORY (INHALATION) ONCE
Status: COMPLETED | OUTPATIENT
Start: 2024-09-27 | End: 2024-09-27

## 2024-09-27 RX ORDER — LEVOTHYROXINE SODIUM 88 UG/1
88 TABLET ORAL DAILY
Status: DISCONTINUED | OUTPATIENT
Start: 2024-09-27 | End: 2024-09-28 | Stop reason: HOSPADM

## 2024-09-27 RX ORDER — LEVOFLOXACIN 250 MG/1
750 TABLET ORAL
Status: DISCONTINUED | OUTPATIENT
Start: 2024-09-27 | End: 2024-09-27

## 2024-09-27 RX ORDER — METOPROLOL TARTRATE 1 MG/ML
5 INJECTION, SOLUTION INTRAVENOUS EVERY 4 HOURS
Status: DISCONTINUED | OUTPATIENT
Start: 2024-09-27 | End: 2024-09-28 | Stop reason: HOSPADM

## 2024-09-27 RX ORDER — WARFARIN 2 MG/1
2 TABLET ORAL EVERY EVENING
COMMUNITY
Start: 2024-09-25 | End: 2024-09-28 | Stop reason: HOSPADM

## 2024-09-27 RX ORDER — INSULIN LISPRO 100 [IU]/ML
0-5 INJECTION, SOLUTION INTRAVENOUS; SUBCUTANEOUS EVERY 4 HOURS
Status: DISCONTINUED | OUTPATIENT
Start: 2024-09-27 | End: 2024-09-28

## 2024-09-27 RX ORDER — HEPARIN SODIUM 5000 [USP'U]/ML
5000 INJECTION, SOLUTION INTRAVENOUS; SUBCUTANEOUS EVERY 8 HOURS
Status: DISCONTINUED | OUTPATIENT
Start: 2024-09-27 | End: 2024-09-27

## 2024-09-27 RX ORDER — ACETAMINOPHEN 325 MG/1
650 TABLET ORAL EVERY 4 HOURS PRN
COMMUNITY
End: 2024-09-28 | Stop reason: HOSPADM

## 2024-09-27 RX ORDER — AMLODIPINE BESYLATE 5 MG/1
10 TABLET ORAL DAILY
Status: DISCONTINUED | OUTPATIENT
Start: 2024-09-27 | End: 2024-09-28 | Stop reason: HOSPADM

## 2024-09-27 RX ORDER — METOPROLOL TARTRATE 1 MG/ML
5 INJECTION, SOLUTION INTRAVENOUS EVERY 6 HOURS
Status: DISCONTINUED | OUTPATIENT
Start: 2024-09-27 | End: 2024-09-27

## 2024-09-27 RX ORDER — SERTRALINE HYDROCHLORIDE 50 MG/1
25 TABLET, FILM COATED ORAL DAILY
Status: DISCONTINUED | OUTPATIENT
Start: 2024-09-27 | End: 2024-09-28 | Stop reason: HOSPADM

## 2024-09-27 RX ORDER — LEVOFLOXACIN 5 MG/ML
750 INJECTION, SOLUTION INTRAVENOUS
Status: DISCONTINUED | OUTPATIENT
Start: 2024-09-27 | End: 2024-09-28 | Stop reason: HOSPADM

## 2024-09-27 RX ORDER — FUROSEMIDE 10 MG/ML
40 INJECTION INTRAMUSCULAR; INTRAVENOUS ONCE
Status: COMPLETED | OUTPATIENT
Start: 2024-09-27 | End: 2024-09-27

## 2024-09-27 RX ORDER — HEPARIN SODIUM 10000 [USP'U]/100ML
0-4000 INJECTION, SOLUTION INTRAVENOUS CONTINUOUS
Status: DISCONTINUED | OUTPATIENT
Start: 2024-09-27 | End: 2024-09-28

## 2024-09-27 RX ORDER — ACETAMINOPHEN 500 MG
1000 TABLET ORAL EVERY 6 HOURS PRN
COMMUNITY
End: 2024-09-28 | Stop reason: HOSPADM

## 2024-09-27 SDOH — ECONOMIC STABILITY: HOUSING INSECURITY: IN THE PAST 12 MONTHS, HOW MANY TIMES HAVE YOU MOVED WHERE YOU WERE LIVING?: 0

## 2024-09-27 SDOH — ECONOMIC STABILITY: INCOME INSECURITY: IN THE LAST 12 MONTHS, WAS THERE A TIME WHEN YOU WERE NOT ABLE TO PAY THE MORTGAGE OR RENT ON TIME?: PATIENT DECLINED

## 2024-09-27 SDOH — HEALTH STABILITY: PHYSICAL HEALTH: ON AVERAGE, HOW MANY MINUTES DO YOU ENGAGE IN EXERCISE AT THIS LEVEL?: 0 MIN

## 2024-09-27 SDOH — SOCIAL STABILITY: SOCIAL NETWORK: HOW OFTEN DO YOU ATTEND CHURCH OR RELIGIOUS SERVICES?: PATIENT DECLINED

## 2024-09-27 SDOH — HEALTH STABILITY: MENTAL HEALTH
HOW OFTEN DO YOU NEED TO HAVE SOMEONE HELP YOU WHEN YOU READ INSTRUCTIONS, PAMPHLETS, OR OTHER WRITTEN MATERIAL FROM YOUR DOCTOR OR PHARMACY?: PATIENT UNABLE TO RESPOND

## 2024-09-27 SDOH — ECONOMIC STABILITY: FOOD INSECURITY: WITHIN THE PAST 12 MONTHS, THE FOOD YOU BOUGHT JUST DIDN'T LAST AND YOU DIDN'T HAVE MONEY TO GET MORE.: PATIENT DECLINED

## 2024-09-27 SDOH — SOCIAL STABILITY: SOCIAL NETWORK: ARE YOU MARRIED, WIDOWED, DIVORCED, SEPARATED, NEVER MARRIED, OR LIVING WITH A PARTNER?: WIDOWED

## 2024-09-27 SDOH — SOCIAL STABILITY: SOCIAL INSECURITY: HAS ANYONE EVER THREATENED TO HURT YOUR FAMILY OR YOUR PETS?: UNABLE TO ASSESS

## 2024-09-27 SDOH — SOCIAL STABILITY: SOCIAL INSECURITY: WERE YOU ABLE TO COMPLETE ALL THE BEHAVIORAL HEALTH SCREENINGS?: NO

## 2024-09-27 SDOH — SOCIAL STABILITY: SOCIAL INSECURITY
WITHIN THE LAST YEAR, HAVE YOU BEEN HUMILIATED OR EMOTIONALLY ABUSED IN OTHER WAYS BY YOUR PARTNER OR EX-PARTNER?: PATIENT DECLINED

## 2024-09-27 SDOH — ECONOMIC STABILITY: INCOME INSECURITY: HOW HARD IS IT FOR YOU TO PAY FOR THE VERY BASICS LIKE FOOD, HOUSING, MEDICAL CARE, AND HEATING?: PATIENT DECLINED

## 2024-09-27 SDOH — SOCIAL STABILITY: SOCIAL INSECURITY: DOES ANYONE TRY TO KEEP YOU FROM HAVING/CONTACTING OTHER FRIENDS OR DOING THINGS OUTSIDE YOUR HOME?: UNABLE TO ASSESS

## 2024-09-27 SDOH — SOCIAL STABILITY: SOCIAL INSECURITY: DO YOU FEEL UNSAFE GOING BACK TO THE PLACE WHERE YOU ARE LIVING?: UNABLE TO ASSESS

## 2024-09-27 SDOH — HEALTH STABILITY: MENTAL HEALTH
STRESS IS WHEN SOMEONE FEELS TENSE, NERVOUS, ANXIOUS, OR CAN'T SLEEP AT NIGHT BECAUSE THEIR MIND IS TROUBLED. HOW STRESSED ARE YOU?: PATIENT DECLINED

## 2024-09-27 SDOH — ECONOMIC STABILITY: INCOME INSECURITY: IN THE PAST 12 MONTHS, HAS THE ELECTRIC, GAS, OIL, OR WATER COMPANY THREATENED TO SHUT OFF SERVICE IN YOUR HOME?: NO

## 2024-09-27 SDOH — SOCIAL STABILITY: SOCIAL INSECURITY
WITHIN THE LAST YEAR, HAVE TO BEEN RAPED OR FORCED TO HAVE ANY KIND OF SEXUAL ACTIVITY BY YOUR PARTNER OR EX-PARTNER?: PATIENT DECLINED

## 2024-09-27 SDOH — SOCIAL STABILITY: SOCIAL NETWORK: HOW OFTEN DO YOU GET TOGETHER WITH FRIENDS OR RELATIVES?: PATIENT DECLINED

## 2024-09-27 SDOH — SOCIAL STABILITY: SOCIAL NETWORK: HOW OFTEN DO YOU ATTENT MEETINGS OF THE CLUB OR ORGANIZATION YOU BELONG TO?: PATIENT DECLINED

## 2024-09-27 SDOH — SOCIAL STABILITY: SOCIAL INSECURITY: HAVE YOU HAD ANY THOUGHTS OF HARMING ANYONE ELSE?: UNABLE TO ASSESS

## 2024-09-27 SDOH — SOCIAL STABILITY: SOCIAL NETWORK
DO YOU BELONG TO ANY CLUBS OR ORGANIZATIONS SUCH AS CHURCH GROUPS UNIONS, FRATERNAL OR ATHLETIC GROUPS, OR SCHOOL GROUPS?: PATIENT DECLINED

## 2024-09-27 SDOH — SOCIAL STABILITY: SOCIAL NETWORK: IN A TYPICAL WEEK, HOW MANY TIMES DO YOU TALK ON THE PHONE WITH FAMILY, FRIENDS, OR NEIGHBORS?: PATIENT DECLINED

## 2024-09-27 SDOH — SOCIAL STABILITY: SOCIAL INSECURITY: WITHIN THE LAST YEAR, HAVE YOU BEEN AFRAID OF YOUR PARTNER OR EX-PARTNER?: PATIENT DECLINED

## 2024-09-27 SDOH — SOCIAL STABILITY: SOCIAL INSECURITY
WITHIN THE LAST YEAR, HAVE YOU BEEN KICKED, HIT, SLAPPED, OR OTHERWISE PHYSICALLY HURT BY YOUR PARTNER OR EX-PARTNER?: PATIENT DECLINED

## 2024-09-27 SDOH — ECONOMIC STABILITY: FOOD INSECURITY: WITHIN THE PAST 12 MONTHS, YOU WORRIED THAT YOUR FOOD WOULD RUN OUT BEFORE YOU GOT MONEY TO BUY MORE.: PATIENT DECLINED

## 2024-09-27 SDOH — SOCIAL STABILITY: SOCIAL INSECURITY: ARE YOU OR HAVE YOU BEEN THREATENED OR ABUSED PHYSICALLY, EMOTIONALLY, OR SEXUALLY BY ANYONE?: UNABLE TO ASSESS

## 2024-09-27 SDOH — SOCIAL STABILITY: SOCIAL INSECURITY: ABUSE: ADULT

## 2024-09-27 SDOH — SOCIAL STABILITY: SOCIAL INSECURITY: ARE THERE ANY APPARENT SIGNS OF INJURIES/BEHAVIORS THAT COULD BE RELATED TO ABUSE/NEGLECT?: NO

## 2024-09-27 SDOH — HEALTH STABILITY: PHYSICAL HEALTH: ON AVERAGE, HOW MANY DAYS PER WEEK DO YOU ENGAGE IN MODERATE TO STRENUOUS EXERCISE (LIKE A BRISK WALK)?: 0 DAYS

## 2024-09-27 SDOH — SOCIAL STABILITY: SOCIAL INSECURITY: HAVE YOU HAD THOUGHTS OF HARMING ANYONE ELSE?: NO

## 2024-09-27 SDOH — SOCIAL STABILITY: SOCIAL INSECURITY: DO YOU FEEL ANYONE HAS EXPLOITED OR TAKEN ADVANTAGE OF YOU FINANCIALLY OR OF YOUR PERSONAL PROPERTY?: UNABLE TO ASSESS

## 2024-09-27 ASSESSMENT — LIFESTYLE VARIABLES
SKIP TO QUESTIONS 9-10: 1
HAVE PEOPLE ANNOYED YOU BY CRITICIZING YOUR DRINKING: NO
HAVE YOU EVER FELT YOU SHOULD CUT DOWN ON YOUR DRINKING: NO
EVER HAD A DRINK FIRST THING IN THE MORNING TO STEADY YOUR NERVES TO GET RID OF A HANGOVER: NO
AUDIT-C TOTAL SCORE: 0
TOTAL SCORE: 0
HOW MANY STANDARD DRINKS CONTAINING ALCOHOL DO YOU HAVE ON A TYPICAL DAY: PATIENT DOES NOT DRINK
HOW OFTEN DO YOU HAVE A DRINK CONTAINING ALCOHOL: NEVER
AUDIT-C TOTAL SCORE: 0
EVER FELT BAD OR GUILTY ABOUT YOUR DRINKING: NO
SUBSTANCE_ABUSE_PAST_12_MONTHS: NO
HOW OFTEN DO YOU HAVE 6 OR MORE DRINKS ON ONE OCCASION: NEVER
PRESCIPTION_ABUSE_PAST_12_MONTHS: NO

## 2024-09-27 ASSESSMENT — PAIN - FUNCTIONAL ASSESSMENT: PAIN_FUNCTIONAL_ASSESSMENT: CPOT (CRITICAL CARE PAIN OBSERVATION TOOL)

## 2024-09-27 ASSESSMENT — COLUMBIA-SUICIDE SEVERITY RATING SCALE - C-SSRS
2. HAVE YOU ACTUALLY HAD ANY THOUGHTS OF KILLING YOURSELF?: NO
6. HAVE YOU EVER DONE ANYTHING, STARTED TO DO ANYTHING, OR PREPARED TO DO ANYTHING TO END YOUR LIFE?: NO
6. HAVE YOU EVER DONE ANYTHING, STARTED TO DO ANYTHING, OR PREPARED TO DO ANYTHING TO END YOUR LIFE?: NO
1. IN THE PAST MONTH, HAVE YOU WISHED YOU WERE DEAD OR WISHED YOU COULD GO TO SLEEP AND NOT WAKE UP?: NO
1. IN THE PAST MONTH, HAVE YOU WISHED YOU WERE DEAD OR WISHED YOU COULD GO TO SLEEP AND NOT WAKE UP?: NO
2. HAVE YOU ACTUALLY HAD ANY THOUGHTS OF KILLING YOURSELF?: NO

## 2024-09-27 ASSESSMENT — ACTIVITIES OF DAILY LIVING (ADL)
WALKS IN HOME: NEEDS ASSISTANCE
ADEQUATE_TO_COMPLETE_ADL: YES
HEARING - RIGHT EAR: FUNCTIONAL
GROOMING: NEEDS ASSISTANCE
TOILETING: NEEDS ASSISTANCE
HEARING - LEFT EAR: FUNCTIONAL
JUDGMENT_ADEQUATE_SAFELY_COMPLETE_DAILY_ACTIVITIES: NO
FEEDING YOURSELF: NEEDS ASSISTANCE
LACK_OF_TRANSPORTATION: PATIENT DECLINED
BATHING: NEEDS ASSISTANCE
DRESSING YOURSELF: NEEDS ASSISTANCE
PATIENT'S MEMORY ADEQUATE TO SAFELY COMPLETE DAILY ACTIVITIES?: NO

## 2024-09-27 ASSESSMENT — COGNITIVE AND FUNCTIONAL STATUS - GENERAL: PATIENT BASELINE BEDBOUND: YES

## 2024-09-27 ASSESSMENT — PATIENT HEALTH QUESTIONNAIRE - PHQ9
1. LITTLE INTEREST OR PLEASURE IN DOING THINGS: NOT AT ALL
2. FEELING DOWN, DEPRESSED OR HOPELESS: NOT AT ALL
SUM OF ALL RESPONSES TO PHQ9 QUESTIONS 1 & 2: 0

## 2024-09-27 NOTE — NURSING NOTE
Patient arrived from ED via cart with RN, RT and tech.  Patient 100% BiPap.  Patient moving all extremities X 4 but not to command and is non-verbal and will not open eyes.  MP = Afib 120s, other VSS

## 2024-09-27 NOTE — NURSING NOTE
Patient moaning, no verbalization of words.  Family states she usually speaks words.  Patient's right hand moving rythmically and grasping side rail.  No spontaneous movement from left hand.  Patient is not following commands.  Slight left facial droop noted.  Vika Gee notified and stat CT of head ordered.

## 2024-09-27 NOTE — H&P
[facility]       Date:  9/27/2024  Patient:  Shona Everett  YOB: 1935  MRN:  [id number]   Admit Date:  9/27/2024  ========================================================================================================    Chief Complaint   Patient presents with    Altered Mental Status     Pt arrived by squad. Unresponsive to painful stimuli         History of Present Illness:  Shona Everett is a 89 y.o. year old female patient with Past Medical History of  dementia, mechanical valve (previously on coumadin but held d/t increased INR), amauresis fugax, afib, HTN, HLD, CABG, osteoporosis, DALTON, and CKD who presented to the ED from her nursing facility due to being found obtunded. Baseline is A&Ox1 and DNR-CCA, DNI. Pt was recently hospitalized from 9/16-9/24 for pneumonia. GCS upon arrival was 7. CXR in ED concerning for PNA and she was treated with azithromycin, cefepime, and vancomycin. BC and UA obtained. VGB drawn c/f respiratory acidosis but sample may have been dilute and did not correlate with CMP. Lactate at 3.2. Pt placed on BiPAP and transferred to SICU.       Interval ICU Events:  9/27: transferred to SICU. Planning for diuresis and BiPAP weaning. GOC with family.    Medical History:  Past Medical History:   Diagnosis Date    Dysuria     Dysuria    Nonrheumatic aortic (valve) stenosis 07/13/2016    Aortic stenosis, severe    Personal history of other diseases of the circulatory system 06/26/2016    History of coronary atherosclerosis    Personal history of other diseases of urinary system     History of hematuria    Personal history of other endocrine, nutritional and metabolic disease 07/13/2016    History of hyperlipidemia    Personal history of other specified conditions     History of chest pain    Personal history of other specified conditions 07/13/2016    History of shortness of breath     Past Surgical History:   Procedure Laterality Date    AORTIC VALVE  "REPLACEMENT  08/25/2016    Aortic Valve Replacement    CORONARY ARTERY BYPASS GRAFT  08/25/2016    CABG    HYSTERECTOMY  10/24/2016    Hysterectomy    MR HEAD ANGIO WO IV CONTRAST  7/18/2022    MR HEAD ANGIO WO IV CONTRAST 7/18/2022 [facility] CLINICAL LEGACY    MR HEAD ANGIO WO IV CONTRAST  7/19/2022    MR HEAD ANGIO WO IV CONTRAST    MR NECK ANGIO WO IV CONTRAST  7/18/2022    MR NECK ANGIO WO IV CONTRAST 7/18/2022 [facility] CLINICAL LEGACY    OTHER SURGICAL HISTORY  07/13/2016    Endarterectomy Carotid Artery    OTHER SURGICAL HISTORY  11/04/2021    Percutaneous transluminal coronary angioplasty    OTHER SURGICAL HISTORY  11/04/2021    Carotid thromboendarterectomy    OTHER SURGICAL HISTORY  08/26/2022    Hip surgery    OTHER SURGICAL HISTORY  07/16/2020    Colpocleisis    OTHER SURGICAL HISTORY  10/24/2016    Carotid Artery Catheterization     (Not in a hospital admission)    Penicillins and Ampicillin  Social History     Tobacco Use    Smoking status: Never    Smokeless tobacco: Never   Vaping Use    Vaping status: Never Used   Substance Use Topics    Alcohol use: Never    Drug use: Never     Family History   Problem Relation Name Age of Onset    Heart attack Mother      Stroke Father      Other (dyslipidemia) Other Multiple Family Members     Other (cerebrovascular disorder) Other Multiple Family Members     Coronary artery disease Other Multiple Family Members     Hypertension Other Multiple Family Members     Lung disease Other Multiple Family Members        Review of Systems:  14 point review of systems was completed and negative except for those specially mention in my HPI    Physical Exam:    Heart Rate:  [109-140]   Temperature:  [35.7 °C (96.3 °F)]   Respirations:  [18-34]   BP: (106-132)/(72-92)   Height:  [165.1 cm (5' 5\")]   Weight:  [73.9 kg (163 lb)]   Pulse Ox:  [89 %-100 %]     Physical Exam  Constitutional:       Appearance: She is ill-appearing.   Eyes:      Pupils: Pupils are equal, round, and reactive to " light.   Cardiovascular:      Rate and Rhythm: Tachycardia present. Rhythm irregular.      Pulses: Normal pulses.   Pulmonary:      Effort: Respiratory distress present.      Breath sounds: Rhonchi present.   Abdominal:      General: Abdomen is flat. Bowel sounds are normal.      Palpations: Abdomen is soft.   Musculoskeletal:      Right lower le+ Pitting Edema present.      Left lower le+ Pitting Edema present.   Skin:     General: Skin is dry.      Capillary Refill: Capillary refill takes less than 2 seconds.      Coloration: Skin is pale.   Neurological:      Mental Status: She is lethargic.      GCS: GCS eye subscore is 1. GCS verbal subscore is 1. GCS motor subscore is 5.         Objective:    I have reviewed all medications, laboratory results, and imaging pertinent for today's encounter    Assessment/Plan:    I am currently managing this critically ill patient for the following problems:    Neuro/Psych/Pain Ctrl/Sedation:  Dementia  Metabolic encephalopathy 2/2 sepsis and acute hypoxic respiratory failure  -GCS 7  -Baseline A&Ox1  -Recently started on depakote, hold for now until mentation improves  -Pt noted to have questionable LFD and LSW and moaning, STAT CTH ordered    Respiratory/ENT:  Acute hypoxic respiratory failure  -Initial VBG 7.10/34/90, concerns for dilution  -Repeat ABG 7.47/37/140  -BiPAP 20/8, switch to Hi-marisel if able to tolerate, wean as able  -CXR concerning for pleural effusion/ vascular congestion, diurese with lasix 40    Cardiovascular:  Afib with RVR  HTN  HLD  Hx of CABG  Hx of mechanical valve, aortic  -Currently in afib 120-150s, IV metoprolol 5 given with minimal effect, give additional 5, schedule IV metoprolol q4h  -Previously on PO lasix, losartan, hydrochlorothiazide but held d/t DALTON  -Currently on amlodipine 10, metoprolol 50 at facility, hold for now while NPO  -Previously on coumadin but held during previous admission  d/t increased INR, current INR 2.0, start  heparin gtt in setting of mechanical valve, will bridge to oral regimen with coumadin when able to tolerate oral intake  -On lipitor 40 at home, hold for now while NPO    GI:  -Currently NPO until mentation improves  -bedside swallow eval when able. MBS 9/18 no evidence of aspiration  -Start PPI    Renal/Volume Status (Intra & Extravascular):  CKD III  -Creatinine at baseline  -Previously on PO lasix, losartan, hydrochlorothiazide but held d/t DALTON  -Diuresing for pleural effusion  -Strict I/Os    Endocrine  -Glucose on arrival 214, received solumedrol 125 mg  -SSI, Q4h POC while NPO  -On synthroid 88 at home, hold for now and convert to IV in 48 hours if still NPO    Infectious Disease:  Sepsis   Lactic acidosis  PNA  -CXR showed consolidation concerning for pneumonia  -BC x2 and UA obtained, add sputum cx   -Given azithromycin 500, cefepime 2, and vancomycin 1 in ED (PCN allergy)  -MRSA negative, switch to levaquin  -Lactate uptrending 3.2 -> 5.2 -> 4.2, otherwise hemodynamically stable    Heme/Onc:  Chronic anemia  Hx of mechanical valve  -baseline hgb ~10  -Previously on coumadin but held during previous admission 9/17 d/t increased INR, current INR 2.0, start heparin gtt in setting of mechanical valve    MSK:  Osteoporosis   -On ibandronate 150 and vit d3 at home, hold while NPO  -PT/OT when clinically appropriate    Ethics/Code Status:  DNR-CCA, DNI  [place] for rehab x3 days after hospital discharge  9/27: GOC with family, does not want to have patient suffer, would not want invasive measures like central lines or feeding tubes  Family considering hospice care if no improvement in next 24 hrs    :  DVT Prophylaxis: heparin gtt  GI Prophylaxis: protonix  Bowel Regimen: none  Diet: NPO  CVC: none  [place]: none  [place]: none  Restraints: none  Dispo: ICU    Critical Care Time:  45 minutes    Plan discussed with Dr. Kathleen Gee, APRN-CNP  DEBRA COOPER

## 2024-09-27 NOTE — ED PROVIDER NOTES
HPI   Chief Complaint   Patient presents with    Altered Mental Status     Pt arrived by squad. Unresponsive to painful stimuli       Patient presented for evaluation of altered mental status.  Patient was found unresponsive at her nursing facility.  Unknown last known well.  Staff from facility indicate the patient is normally ANO x 1.  Patient is DNR/DNI.  Patient is currently being treated for pneumonia per information from the facility.  Due to mental status changes patient is unable to participate with history of present illness.      History provided by:  EMS personnel  History limited by:  Mental status change          Patient History   Past Medical History:   Diagnosis Date    Dysuria     Dysuria    Nonrheumatic aortic (valve) stenosis 07/13/2016    Aortic stenosis, severe    Personal history of other diseases of the circulatory system 06/26/2016    History of coronary atherosclerosis    Personal history of other diseases of urinary system     History of hematuria    Personal history of other endocrine, nutritional and metabolic disease 07/13/2016    History of hyperlipidemia    Personal history of other specified conditions     History of chest pain    Personal history of other specified conditions 07/13/2016    History of shortness of breath     Past Surgical History:   Procedure Laterality Date    AORTIC VALVE REPLACEMENT  08/25/2016    Aortic Valve Replacement    CORONARY ARTERY BYPASS GRAFT  08/25/2016    CABG    HYSTERECTOMY  10/24/2016    Hysterectomy    MR HEAD ANGIO WO IV CONTRAST  7/18/2022    MR HEAD ANGIO WO IV CONTRAST 7/18/2022 [facility] CLINICAL LEGACY    MR HEAD ANGIO WO IV CONTRAST  7/19/2022    MR HEAD ANGIO WO IV CONTRAST    MR NECK ANGIO WO IV CONTRAST  7/18/2022    MR NECK ANGIO WO IV CONTRAST 7/18/2022 [facility] CLINICAL LEGACY    OTHER SURGICAL HISTORY  07/13/2016    Endarterectomy Carotid Artery    OTHER SURGICAL HISTORY  11/04/2021    Percutaneous transluminal coronary angioplasty    OTHER  SURGICAL HISTORY  11/04/2021    Carotid thromboendarterectomy    OTHER SURGICAL HISTORY  08/26/2022    Hip surgery    OTHER SURGICAL HISTORY  07/16/2020    Colpocleisis    OTHER SURGICAL HISTORY  10/24/2016    Carotid Artery Catheterization     Family History   Problem Relation Name Age of Onset    Heart attack Mother      Stroke Father      Other (dyslipidemia) Other Multiple Family Members     Other (cerebrovascular disorder) Other Multiple Family Members     Coronary artery disease Other Multiple Family Members     Hypertension Other Multiple Family Members     Lung disease Other Multiple Family Members      Social History     Tobacco Use    Smoking status: Never    Smokeless tobacco: Never   Vaping Use    Vaping status: Never Used   Substance Use Topics    Alcohol use: Never    Drug use: Never       Physical Exam   ED Triage Vitals   Temp Pulse Resp BP   -- -- -- --      SpO2 Temp src Heart Rate Source Patient Position   -- -- -- --      BP Location FiO2 (%)     -- --       Physical Exam  Vitals and nursing note reviewed.   Constitutional:       Appearance: She is ill-appearing and toxic-appearing.   HENT:      Head: Atraumatic.      Mouth/Throat:      Comments: ET Tube in Place  Eyes:      Pupils: Pupils are equal, round, and reactive to light.   Neck:      Trachea: Trachea normal.   Cardiovascular:      Rate and Rhythm: Tachycardia present. Rhythm irregularly irregular.      Pulses:           Radial pulses are 2+ on the right side and 2+ on the left side.        Popliteal pulses are 2+ on the right side and 2+ on the left side.   Pulmonary:      Effort: Tachypnea and respiratory distress present.      Breath sounds: Decreased air movement present. Examination of the right-upper field reveals rhonchi. Examination of the left-upper field reveals rhonchi. Examination of the right-middle field reveals rales. Examination of the left-middle field reveals rales. Rhonchi and rales present.   Abdominal:       Palpations: Abdomen is soft.   Musculoskeletal:      Right lower leg: Edema present.      Left lower leg: Edema present.   Skin:     General: Skin is cool.      Coloration: Skin is pale.   Neurological:      GCS: GCS eye subscore is 1. GCS verbal subscore is 1. GCS motor subscore is 1.   Psychiatric:         Speech: She is noncommunicative.           ED Course & MDM   ED Course as of 09/28/24 2220   Fri Sep 27, 2024   0448 On review of VBG I have concern regarding the validity of these findings.  Will compare with findings from blood draw. [JA]   0458 Discussed with family at bedside.  The patient's son Delfino do of  indicates that she would not wish to be on the BiPAP.  He will discuss with his siblings regarding making the patient DNR CC. [JA]   0609 Discussed further with the patient family at bedside.  They are determining whether or not they wish the patient to remain on BiPAP or make the patient DNR CC. [JA]   0721 Discussed with Vika in ICU and she accepts patient to their service. [JA]      ED Course User Index  [JA] Andrade Farr, DO         Diagnoses as of 09/28/24 2220   Acute respiratory failure with hypoxia (Multi)   Altered mental status, unspecified altered mental status type   Sepsis, due to unspecified organism, unspecified whether acute organ dysfunction present (Multi)   Pneumonia due to infectious organism, unspecified laterality, unspecified part of lung   Atrial fibrillation, unspecified type (Multi)                 No data recorded     David Coma Scale Score: 6 (09/28/24 0800 : Miriam Ruiz, KANDI)       NIH Stroke Scale: 13 (09/28/24 0800 : Miriam Ruiz, RN)                   Medical Decision Making  Differential diagnosis: Respiratory failure with hypercapnia and hypoxia, pneumonia, CHF, ICH, other    Patient presenting from a nursing facility for decreased responsiveness.  Patient found to be hypoxic.  Patient recently treated for pneumonia.  Patient currently on Levaquin  per information from the facility.  Information from the facility indicate the patient is DNR DNI.  Patient is significantly hypoxic on 15 L via nonrebreather.  Patient started on BiPAP at this time.  Broad-spectrum antibiotics started as there is concern for sepsis.  Patient's family arrives and they indicate the patient would likely wish to be DNR comfort care.  However they are unsure as to whether or not they feel comfortable removing BiPAP at this time.  Upon further discussion they wish the patient to be DNR CCA and remain on BiPAP without other measures until they have a chance to discuss further.  Patient becoming more alert in the ED.  Patient mated to ICU.        Procedure  Critical Care    Performed by: Andrade Farr DO  Authorized by: Andrade Farr DO    Critical care provider statement:     Critical care time (minutes):  35    Critical care time was exclusive of:  Separately billable procedures and treating other patients    Critical care was necessary to treat or prevent imminent or life-threatening deterioration of the following conditions:  Sepsis and respiratory failure    Critical care was time spent personally by me on the following activities:  Ordering and performing treatments and interventions, ordering and review of laboratory studies, ordering and review of radiographic studies, pulse oximetry, re-evaluation of patient's condition, review of old charts, evaluation of patient's response to treatment and development of treatment plan with patient or surrogate       Andrade Farr DO  09/28/24 6211

## 2024-09-27 NOTE — PROGRESS NOTES
09/27 patient now in ICU on full BIPAP. Son advises that family is open to discussing Hospice. However, they have not made committed choice. They are aware Discharge team are available over Weekend if they require assistance.

## 2024-09-27 NOTE — PROGRESS NOTES
Subjective   Patient ID: Shona Everett is a 89 y.o. female who is acute skilled care and presents for initial visit for skilled nursing.    Pt was seen after being admitted recently for altered sensorium and weakness, pt has AVR and has been on chronic warfarin, INR was abnormal, needed to be reversed, treated with IV Abx, documentation not very clear for the reason for stopping warfarin, states risk v/s benefit, Pt has mechanical prosthetic Ao valve and AF, pt has cognitive dysfunction, pt has been doing poorly with IADLs and ADLs, Pt has Hx of Hypothyroidism and chronic depression, they started pt on depakote for behavioural changes, discharged without warfarin, today when I see pt, she is asleep, she woke up, confused but not cobative, pt is admitted here for skilled nursing and rehabilitation. Hospital data nd information reviewed, nursing staff tried to call family about warfarin but could not get proper answer about cessation, they seems to be interested in palliative care?         Review of Systems   Constitutional:  Positive for activity change and fatigue.   HENT:  Positive for hearing loss.    Respiratory:  Negative for apnea, cough and shortness of breath.    Cardiovascular:  Positive for leg swelling.   Gastrointestinal:  Negative for abdominal pain and nausea.   Musculoskeletal:  Positive for arthralgias.   Skin:  Positive for color change.   Neurological:  Positive for weakness.   Psychiatric/Behavioral:  Positive for confusion.        Objective   /67   Pulse 83     Physical Exam  Constitutional:       Appearance: Normal appearance. She is normal weight.   HENT:      Head: Normocephalic.      Nose: Nose normal.   Eyes:      Conjunctiva/sclera: Conjunctivae normal.   Cardiovascular:      Rate and Rhythm: Normal rate. Rhythm irregular.      Heart sounds: Normal heart sounds.      Comments: Prosthetic click  Pulmonary:      Effort: Pulmonary effort is normal.      Breath sounds: Rales present.    Abdominal:      Palpations: Abdomen is soft.   Musculoskeletal:         General: Deformity present. No tenderness.      Cervical back: Neck supple.   Skin:     General: Skin is warm and dry.      Findings: Bruising present.   Neurological:      General: No focal deficit present.      Mental Status: Mental status is at baseline. She is disoriented.   Psychiatric:         Behavior: Behavior normal.         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 INR three times a week. No further antibiotics? Rx list incl depakote, amlodipine,Boniva which will be stopped, levothyroxine, sertraline and toprol XL. , Rx list reviewed. PT and OT evaluation is in the process. Routine safety measures, fall precautions, risk modification and alarm placement if needed for prevention of falls. Skin care precautions, prevention of pressures sores at pressure points assessed. Pt needs to be monitored frequently by nursing staff particularly at night time. Any confusion, agitation or behavioural disturbance needs to be attended, as per home policy rapid covid Ag assay need to be done, notify if positive. If needed appropriate measures to be taken for alarm placements and assisted devices, pt was told not to get up and ambulate at night unless help and assist available at bedside, labs will be done as per our routine protocol. PO intake need to be monitored . Lets wait for few days, if needed will talk with family. In controlled rate now.      Goals    None

## 2024-09-28 VITALS
DIASTOLIC BLOOD PRESSURE: 108 MMHG | SYSTOLIC BLOOD PRESSURE: 156 MMHG | TEMPERATURE: 96.6 F | OXYGEN SATURATION: 88 % | RESPIRATION RATE: 30 BRPM | BODY MASS INDEX: 26.34 KG/M2 | WEIGHT: 158.07 LBS | HEIGHT: 65 IN | HEART RATE: 140 BPM

## 2024-09-28 LAB
ANION GAP SERPL CALC-SCNC: 11 MMOL/L (ref 10–20)
BUN SERPL-MCNC: 21 MG/DL (ref 6–23)
CALCIUM SERPL-MCNC: 8.2 MG/DL (ref 8.6–10.3)
CHLORIDE SERPL-SCNC: 98 MMOL/L (ref 98–107)
CO2 SERPL-SCNC: 31 MMOL/L (ref 21–32)
CREAT SERPL-MCNC: 0.8 MG/DL (ref 0.5–1.05)
EGFRCR SERPLBLD CKD-EPI 2021: 71 ML/MIN/1.73M*2
ERYTHROCYTE [DISTWIDTH] IN BLOOD BY AUTOMATED COUNT: 16.5 % (ref 11.5–14.5)
GLUCOSE BLD MANUAL STRIP-MCNC: 122 MG/DL (ref 74–99)
GLUCOSE SERPL-MCNC: 131 MG/DL (ref 74–99)
HCT VFR BLD AUTO: 29.2 % (ref 36–46)
HGB BLD-MCNC: 9.7 G/DL (ref 12–16)
HOLD SPECIMEN: NORMAL
LEGIONELLA AG UR QL: NEGATIVE
MAGNESIUM SERPL-MCNC: 2.34 MG/DL (ref 1.6–2.4)
MCH RBC QN AUTO: 28.4 PG (ref 26–34)
MCHC RBC AUTO-ENTMCNC: 33.2 G/DL (ref 32–36)
MCV RBC AUTO: 86 FL (ref 80–100)
NRBC BLD-RTO: 0 /100 WBCS (ref 0–0)
PLATELET # BLD AUTO: 197 X10*3/UL (ref 150–450)
POTASSIUM SERPL-SCNC: 4.1 MMOL/L (ref 3.5–5.3)
RBC # BLD AUTO: 3.41 X10*6/UL (ref 4–5.2)
SODIUM SERPL-SCNC: 136 MMOL/L (ref 136–145)
UFH PPP CHRO-ACNC: 0.5 IU/ML
UFH PPP CHRO-ACNC: 0.7 IU/ML
WBC # BLD AUTO: 29.7 X10*3/UL (ref 4.4–11.3)

## 2024-09-28 PROCEDURE — 83735 ASSAY OF MAGNESIUM: CPT | Performed by: NURSE PRACTITIONER

## 2024-09-28 PROCEDURE — 85027 COMPLETE CBC AUTOMATED: CPT | Performed by: NURSE PRACTITIONER

## 2024-09-28 PROCEDURE — 2500000001 HC RX 250 WO HCPCS SELF ADMINISTERED DRUGS (ALT 637 FOR MEDICARE OP): Performed by: NURSE PRACTITIONER

## 2024-09-28 PROCEDURE — 82947 ASSAY GLUCOSE BLOOD QUANT: CPT

## 2024-09-28 PROCEDURE — 99238 HOSP IP/OBS DSCHRG MGMT 30/<: CPT | Performed by: NURSE PRACTITIONER

## 2024-09-28 PROCEDURE — 36415 COLL VENOUS BLD VENIPUNCTURE: CPT | Performed by: NURSE PRACTITIONER

## 2024-09-28 PROCEDURE — 2500000005 HC RX 250 GENERAL PHARMACY W/O HCPCS: Performed by: NURSE PRACTITIONER

## 2024-09-28 PROCEDURE — 80048 BASIC METABOLIC PNL TOTAL CA: CPT | Performed by: NURSE PRACTITIONER

## 2024-09-28 PROCEDURE — 2500000004 HC RX 250 GENERAL PHARMACY W/ HCPCS (ALT 636 FOR OP/ED): Performed by: NURSE PRACTITIONER

## 2024-09-28 PROCEDURE — 85520 HEPARIN ASSAY: CPT | Performed by: NURSE PRACTITIONER

## 2024-09-28 RX ORDER — FUROSEMIDE 10 MG/ML
40 INJECTION INTRAMUSCULAR; INTRAVENOUS ONCE
Status: COMPLETED | OUTPATIENT
Start: 2024-09-28 | End: 2024-09-28

## 2024-09-28 RX ORDER — NYSTATIN 100000 U/G
CREAM TOPICAL 2 TIMES DAILY
Status: DISCONTINUED | OUTPATIENT
Start: 2024-09-28 | End: 2024-09-28 | Stop reason: HOSPADM

## 2024-09-28 RX ORDER — HALOPERIDOL 5 MG/ML
2 INJECTION INTRAMUSCULAR EVERY 6 HOURS PRN
Status: DISCONTINUED | OUTPATIENT
Start: 2024-09-28 | End: 2024-09-28 | Stop reason: HOSPADM

## 2024-09-28 NOTE — PROGRESS NOTES
SW notified that family is now wanting to meeting with hospice preference is [facility]. They met with [facility] when recently at [facility] but decided not to pursue hospice and now are wanting to revisit. Referral sent in careport to [facility] and asked to reach out to son Delfino Donahue for meeting. Care Transition team to continue to follow and assist as needed. SHAHRAM Leiva    Update 7035 [facility] meeting scheduled for [id number]. SHAHRAM Leiva

## 2024-09-28 NOTE — PROGRESS NOTES
[facility] Critical Care Medicine       Date:  9/28/2024  Patient:  Shona Everett  YOB: 1935  MRN:  [id number]   Admit Date:  9/27/2024  ========================================================================================================    Chief Complaint   Patient presents with    Altered Mental Status     Pt arrived by squad. Unresponsive to painful stimuli         History of Present Illness:  Shona Everett is a 89 y.o. year old female patient with Past Medical History of dementia, mechanical valve (previously on coumadin but held d/t increased INR), amauresis fugax, afib, HTN, HLD, CABG, osteoporosis, DALTON, and CKD who presented to the ED from her nursing facility due to being found obtunded. Baseline is A&Ox1 and DNR-CCA, DNI. Pt was recently hospitalized from 9/16-9/24 for pneumonia. GCS upon arrival was 7. CXR in ED concerning for PNA and she was treated with azithromycin, cefepime, and vancomycin. BC and UA obtained. VGB drawn c/f respiratory acidosis but sample may have been dilute and did not correlate with CMP. Lactate at 3.2. Pt placed on BiPAP and transferred to SICU.        Interval ICU Events:  9/27: transferred to SICU. Planning for diuresis and BiPAP weaning. GOC with family.   9/28: A Fib with HR 70s. Decreasing oxygen requirements. Precedex for agitation overnight, discontinued this am to assess neurologic function. Continues to have reduced movement of LUE with improved movement in LLE, does not follow commands. No verbal response this am although nursing reports one word responses overnight. Discussion completed with family see ACP documentation. Decision made to pursue hospice with [facility] and comfort measures. Family would like to wait until hospice meeting to change code status to DNRCC. Continue current management except for heparin drip after discussion with family.     Medical History:  Past Medical History:   Diagnosis Date    Dysuria     Dysuria    Nonrheumatic aortic  (valve) stenosis 07/13/2016    Aortic stenosis, severe    Personal history of other diseases of the circulatory system 06/26/2016    History of coronary atherosclerosis    Personal history of other diseases of urinary system     History of hematuria    Personal history of other endocrine, nutritional and metabolic disease 07/13/2016    History of hyperlipidemia    Personal history of other specified conditions     History of chest pain    Personal history of other specified conditions 07/13/2016    History of shortness of breath     Past Surgical History:   Procedure Laterality Date    AORTIC VALVE REPLACEMENT  08/25/2016    Aortic Valve Replacement    CORONARY ARTERY BYPASS GRAFT  08/25/2016    CABG    HYSTERECTOMY  10/24/2016    Hysterectomy    MR HEAD ANGIO WO IV CONTRAST  7/18/2022    MR HEAD ANGIO WO IV CONTRAST 7/18/2022 [facility] CLINICAL LEGACY    MR HEAD ANGIO WO IV CONTRAST  7/19/2022    MR HEAD ANGIO WO IV CONTRAST    MR NECK ANGIO WO IV CONTRAST  7/18/2022    MR NECK ANGIO WO IV CONTRAST 7/18/2022 [facility] CLINICAL LEGACY    OTHER SURGICAL HISTORY  07/13/2016    Endarterectomy Carotid Artery    OTHER SURGICAL HISTORY  11/04/2021    Percutaneous transluminal coronary angioplasty    OTHER SURGICAL HISTORY  11/04/2021    Carotid thromboendarterectomy    OTHER SURGICAL HISTORY  08/26/2022    Hip surgery    OTHER SURGICAL HISTORY  07/16/2020    Colpocleisis    OTHER SURGICAL HISTORY  10/24/2016    Carotid Artery Catheterization     Medications Prior to Admission   Medication Sig Dispense Refill Last Dose    amLODIPine (Norvasc) 10 mg tablet TAKE 1 TABLET BY MOUTH EVERY DAY AS DIRECTED (Patient taking differently: Take 1 tablet (10 mg) by mouth once daily.) 90 tablet 3 9/26/2024 at 0900    atorvastatin (Lipitor) 40 mg tablet TAKE 1 TABLET BY MOUTH EVERY DAY (Patient taking differently: Take 1 tablet (40 mg) by mouth once daily at bedtime.) 90 tablet 3 9/26/2024 at 2100    divalproex sprinkle (Depakote Sprinkle) 125 mg  DR capsule Take 1 capsule (125 mg) by mouth every 12 hours. 60 capsule 0 9/26/2024 at 2100    levothyroxine (Synthroid, Levoxyl) 88 mcg tablet PLEASE TAKE 1 TABLET BY MOUTH BEFORE BREAKFAST. THANK YOU. 90 tablet 1 9/26/2024 at 0600    magnesium oxide (Mag-Ox) 400 mg (241.3 mg magnesium) tablet TAKE 1 TABLET BY MOUTH TWICE A DAY WITH BREAKFAST AND SUPPER 180 tablet 1 9/26/2024 at 1700    metoprolol succinate XL (Toprol-XL) 50 mg 24 hr tablet Take 1 tablet (50 mg) by mouth 2 times a day. 180 tablet 3 9/26/2024 at 2100    sertraline (Zoloft) 25 mg tablet TAKE 1 TABLET BY MOUTH EVERY DAY 90 tablet 0 9/26/2024 at 0900    warfarin (Coumadin) 2 mg tablet Take 1 tablet (2 mg) by mouth once daily in the evening. Take as directed per After Visit Summary.   9/26/2024 at 1600    acetaminophen (Tylenol) 325 mg tablet Take 2 tablets (650 mg) by mouth every 4 hours if needed for mild pain (1 - 3) or fever (temp greater than 38.0 C).   Unknown    acetaminophen (Tylenol) 500 mg tablet Take 2 tablets (1,000 mg) by mouth every 6 hours if needed for mild pain (1 - 3) or moderate pain (4 - 6). Do Not Exceed 3 grams in 24 hours   Unknown    cholecalciferol (Vitamin D-3) 50,000 unit capsule PLEASE TAKE 1 TABLET BY MOUTH ONCE A WEEK, SUNDAYS, WITH LUNCH AND A FULL GLASS OF WATER. THANK YOU. 13 capsule 0 Unknown    ibandronate (Boniva) 150 mg tablet Take 1 tablet (150 mg) by mouth every 30 (thirty) days. Take in morning with full glass of water on an empty stomach. No food, drink, meds, or lying down for 60 minutes after. (Patient not taking: Reported on 9/27/2024) 3 tablet 0 Not Taking    nitroglycerin (Nitrostat) 0.4 mg SL tablet PLACE 1 TABLET UNDER THE TONGUE EVERY 5 MINUTES FOR UP TO 3 DOSES AS NEEDED FOR CHEST PAIN.CALL 911 IF PAIN PERSISTS.   Unknown    oxygen (O2) gas therapy Inhale 1 each every 12 hours.        Penicillins and Ampicillin  Social History     Tobacco Use    Smoking status: Never    Smokeless tobacco: Never   Vaping  "Use    Vaping status: Never Used   Substance Use Topics    Alcohol use: Never    Drug use: Never     Family History   Problem Relation Name Age of Onset    Heart attack Mother      Stroke Father      Other (dyslipidemia) Other Multiple Family Members     Other (cerebrovascular disorder) Other Multiple Family Members     Coronary artery disease Other Multiple Family Members     Hypertension Other Multiple Family Members     Lung disease Other Multiple Family Members        Review of Systems:  14 point review of systems was completed and negative except for those specially mention in my HPI    Physical Exam:    Heart Rate:  []   Temp:  [35.9 °C (96.6 °F)]   Resp:  [16-36]   BP: ()/()   Height:  [165.1 cm (5' 5\")]   Weight:  [71.7 kg (158 lb 1.1 oz)]   SpO2:  [90 %-100 %]     Physical Exam  Constitutional:       Appearance: She is ill-appearing.   Eyes:      Pupils: Pupils are equal, round, and reactive to light.   Cardiovascular:      Rate and Rhythm: Normal rate. Rhythm irregular.      Pulses: Normal pulses.   Pulmonary:      Effort: Pulmonary effort is normal.      Comments: crackles LLL  Abdominal:      General: Abdomen is flat. Bowel sounds are normal.      Palpations: Abdomen is soft.   Musculoskeletal:      Right lower le+ Pitting Edema present.      Left lower le+ Pitting Edema present.   Skin:     General: Skin is warm and dry.      Capillary Refill: Capillary refill takes less than 2 seconds.      Coloration: Skin is pale.      Findings: Bruising present.   Neurological:      Mental Status: She is unresponsive.      GCS: GCS eye subscore is 1. GCS verbal subscore is 1. GCS motor subscore is 5.         Objective:    I have reviewed all medications, laboratory results, and imaging pertinent for today's encounter    Assessment/Plan:    I am currently managing this critically ill patient for the following problems:    Neuro/Psych/Pain Ctrl/Sedation:  Dementia  Metabolic encephalopathy " 2/2 sepsis and acute hypoxic respiratory failure  -GCS 7  -Baseline A&Ox1  -Recently started on depakote, hold for now until mentation improves  -CT head negative, no improvement in movement in LUE  -Stop precedex     Respiratory/ENT:  Acute hypoxic respiratory failure  -Wean Hi-marisel to NC if tolerated  -Mild crackles heard LLL, IV lasix 40mg today     Cardiovascular:  Afib with RVR  HTN  HLD  Hx of CABG  Hx of mechanical valve, aortic  -Remains in afib but with better rate control 70s-90s, currently on precedex, continue scheduled IV metoprolol q4h, consider decreasing dose as BP and HR allow  -Previously on PO lasix, losartan, hydrochlorothiazide but held d/t DALTON  -Currently on amlodipine 10, metoprolol 50 at facility, hold for now while NPO  -Previously on coumadin but held during previous admission 9/17 d/t increased INR, on admission INR 2.0, continue heparin gtt in setting of mechanical valve, will bridge to oral regimen with coumadin when able to tolerate oral intake  -On lipitor 40 at home, hold for now while NPO     GI:  -Currently NPO until mentation improves  -bedside swallow eval when able. MBS 9/18 no evidence of aspiration  -Start PPI     Renal/Volume Status (Intra & Extravascular):  CKD III  -Creatinine at baseline  -Previously on PO lasix, losartan, hydrochlorothiazide but held d/t DALTON  -Consider diuresing again today  -Strict I/Os    Endocrine  -Glucose on arrival 214, received solumedrol 125 mg  -SSI, Q4h POC while NPO  -On synthroid 88 at home, hold for now and convert to IV in 48 hours if still NPO     Infectious Disease:  Sepsis   Lactic acidosis  PNA  -CXR on admission showed consolidation concerning for pneumonia  -BC drawn 9/27 with no growth, UA with no leuks or nitrites, sputum cx still needs collected   -Continue levaquin q48hr  -Lactate downtrending, hemodynamically stable, redraw if clinically indicated     Heme/Onc:  Chronic anemia  Hx of mechanical valve  -baseline hgb ~10  -Previously  on coumadin but held during previous admission 9/17 d/t increased INR, on admission INR 2.0, continue heparin gtt in setting of mechanical valve, will bridge to oral regimen with coumadin when able to tolerate oral intake     MSK:  Osteoporosis   -On ibandronate 150 and vit d3 at home, hold while NPO  -PT/OT when clinically appropriate     Ethics/Code Status:  DNR-CCA, DNI  [place] for rehab x3 days after hospital discharge  9/27: GOC with family, does not want to have patient suffer, would not want invasive measures like central lines or feeding tubes  Family considering hospice care if no improvement in next 24 hrs     :  DVT Prophylaxis: heparin gtt  GI Prophylaxis: protonix  Bowel Regimen: none  Diet: NPO  CVC: none  [place]: none  [place]: none  Restraints: none  Dispo: ICU    35 minutes spent in preparing to see patient (I.e. review of medical records), evaluation of diagnostics (I.e. labs, imaging, etc.), documentation, discussing plan of care with patient/ family/ caregiver, and/ or coordination of care with multidisciplinary team. Time does not include completion of procedure time.       Plan of care discussed with Dr. Kathleen Gee, APRN-CNP    DEBRA COOPER

## 2024-09-28 NOTE — ACP (ADVANCE CARE PLANNING)
Confirming Previous Code Status:   Advance Care Planning Note     Discussion Date: 09/28/24   Discussion Participants: daughter and son    The patient wishes to discuss Advance Care Planning today and the following is a brief summary of our discussion.     Patient has capacity to make their own medical decisions: No  Health Care Agent/Surrogate Decision Maker documented in chart: Yes    Documents on file and valid:  Advance Directive/Living Will: Yes   Health Care Power of : Yes  Other:     Communication of Medical Status/Prognosis:   Discussed with family members at the bedside regarding clinical status. Reported decrease in oxygen requirements, labs, mental status/ encephalopathy, concern for possible CVA, and ATC.     Communication of Treatment Goals/Options:   Offered family to continue with current course of treatment. Discussed with family regarding encephalopathy and inability to provide oral medication and nutrition. Informed family of need for alternative feeding methods by means of placement of small bore feeding tube. Offered alternative to proceed with comfort measures as family had previously discussed on prior hospitalization.     Treatment Decisions  Goals of Care: comfort is paramount; other goals are not relevant or achievable   Family would like to meet with [facility] as has already had meeting when hospitalized at [facility] couple weeks ago. Discussed with family heparin drip and after risk/ benefit discussion decided to discontinue heparin infusion.     Follow Up Plan  Plan meeting with [facility], continue current management and CODE STATUS except for heparin drip until meeting or if patient decompenstates.  Team Members  VALENTINA Robbins    Time Statement: Total face to face time spent on advance care planning was 20 minutes with 20 minutes spent in counseling, including the explanation.    VALENTINA Robbins  9/28/2024 11:38 AM

## 2024-09-28 NOTE — DISCHARGE SUMMARY
Discharge Diagnosis  Acute respiratory failure with hypoxia (Multi)    Issues Requiring Follow-Up  Hospice admission    Test Results Pending At Discharge  Pending Labs       Order Current Status    Streptococcus pneumoniae Antigen, Urine In process    Blood Culture Preliminary result    Blood Culture Preliminary result            Hospital Course   Shona Everett is a 89 y.o. year old female patient with Past Medical History of  dementia, mechanical valve (previously on coumadin but held d/t increased INR), amauresis fugax, afib, HTN, HLD, CABG, osteoporosis, DALTON, and CKD who presented to the ED from her nursing facility due to being found obtunded. Was recently hospitalized from 9/16-9/24 for pneumonia at [facility] and discharged to [facility] for rehab. Was at rehab for 3 days when had change in mental status along with hypoxia. GCS upon arrival was 7. CXR in ED concerning for PNA and was treated with azithromycin, cefepime, and vancomycin. BC and UA obtained. VGB drawn c/f respiratory acidosis but sample may have been dilute and did not correlate with CMP. Lactate at 3.2. Initiated on BiPAP therapy which was later transitioned to HFNC and antibiotic therapy with Levaquin. Respiratory status continued to improve with decreasing oxygen requirement but no significant improvement in metabolic encephalopathy along with limitations in movement of LUE. Discussion completed with family regarding clinical status, pneumonia, left sided deficit concerning for possible stroke, and altered mentation. Informed family that long term may be looking at bedridden state with requirement of initiation of alternative feeding method for nutrition in form of a feeding tube. After family discussion decision was made to pursue comfort measures and hospice care.     Pertinent Physical Exam At Time of Discharge  Physical Exam  Constitutional:       Appearance: She is ill-appearing.   HENT:      Head: Normocephalic.      Mouth/Throat:       Mouth: Mucous membranes are moist.   Eyes:      Conjunctiva/sclera: Conjunctivae normal.      Pupils: Pupils are equal, round, and reactive to light.   Cardiovascular:      Rate and Rhythm: Tachycardia present. Rhythm irregular.   Pulmonary:      Effort: Pulmonary effort is normal.      Breath sounds: Rhonchi present.   Abdominal:      General: Bowel sounds are normal.      Palpations: Abdomen is soft.   Skin:     General: Skin is warm.      Capillary Refill: Capillary refill takes 2 to 3 seconds.      Coloration: Skin is pale.   Neurological:      Mental Status: She is lethargic and disoriented.      Cranial Nerves: Cranial nerve deficit (left upper extremity) present.      Motor: Weakness present.         Home Medications     Medication List      STOP taking these medications     acetaminophen 325 mg tablet; Commonly known as: Tylenol   acetaminophen 500 mg tablet; Commonly known as: Tylenol   amLODIPine 10 mg tablet; Commonly known as: Norvasc   atorvastatin 40 mg tablet; Commonly known as: Lipitor   cholecalciferol 50,000 unit capsule; Commonly known as: Vitamin D-3   divalproex sprinkle 125 mg DR capsule; Commonly known as: Depakote   Sprinkle   ibandronate 150 mg tablet; Commonly known as: Boniva   levothyroxine 88 mcg tablet; Commonly known as: Synthroid, Levoxyl   magnesium oxide 400 mg (241.3 mg magnesium) tablet; Commonly known as:   Mag-Ox   metoprolol succinate XL 50 mg 24 hr tablet; Commonly known as: Toprol-XL   nitroglycerin 0.4 mg SL tablet; Commonly known as: Nitrostat   oxygen gas therapy; Commonly known as: O2   sertraline 25 mg tablet; Commonly known as: Zoloft   warfarin 2 mg tablet; Commonly known as: Coumadin       Outpatient Follow-Up  Future Appointments   Date Time Provider Department Center   1/3/2025  1:00 PM Iva Sanchez MD [id number] [place]   2/21/2025  2:00 PM Apryl Parsons MD [id number] [place]       Vika Gee, APRN-CNP

## 2024-09-28 NOTE — NURSING NOTE
RN Hospice Note      Hospice terminal diagnosis: Cerebrovascular Disease  Physician: Dr. Preston Wang  Visit type: Initial Visit    Comments/recommendations: Met with pt son, Delfino Everett and pt daughter, Karo Vuong. Medicare hospice benefit/philosophy of care explained. Hospice inpatient unit for symptom management discussed.  Family voiced wish for pt to transfer to [facility]. This RN obtained agreement from pt other 2 children, Kecia Patterson and Robinson Everett who both agreed.  Medical records faxed. Report called. Transport arranged via [facility] for  8:00pm.      Plan of care reviewed with patient/family members:   1) Delfino Everett, son  2) Karo Vuong, daughter     Plan of care reviewed with hospital staff members:   1) Miriam Ruiz RN  2) Vika Gee NP     Please notify [facility] of any changes in condition. Thank you.  Office: [phone] (8 am-6:30 pm M-F and 8 am-4:30 pm weekends and holidays)   [phone] (6:30 pm-8 am M-F and 4:30 pm-8 am weekends and holidays)    Ros Wharton RN

## 2024-09-29 LAB
BACTERIA BLD CULT: NORMAL
BACTERIA BLD CULT: NORMAL

## 2024-09-30 LAB
ATRIAL RATE: 115 BPM
Q ONSET: 222 MS
QRS COUNT: 23 BEATS
QRS DURATION: 106 MS
QT INTERVAL: 328 MS
QTC CALCULATION(BAZETT): 496 MS
QTC FREDERICIA: 432 MS
R AXIS: 122 DEGREES
T AXIS: -43 DEGREES
T OFFSET: 386 MS
VENTRICULAR RATE: 138 BPM

## 2024-10-01 LAB
ATRIAL RATE: 113 BPM
BACTERIA BLD CULT: NORMAL
BACTERIA BLD CULT: NORMAL
Q ONSET: 217 MS
QRS COUNT: 22 BEATS
QRS DURATION: 108 MS
QT INTERVAL: 338 MS
QTC CALCULATION(BAZETT): 504 MS
QTC FREDERICIA: 441 MS
R AXIS: 115 DEGREES
T AXIS: -40 DEGREES
T OFFSET: 386 MS
VENTRICULAR RATE: 134 BPM

## 2024-10-04 LAB — SCAN RESULT: NORMAL

## 2025-01-03 ENCOUNTER — APPOINTMENT (OUTPATIENT)
Dept: NEUROLOGY | Facility: CLINIC | Age: 89
End: 2025-01-03
Payer: COMMERCIAL

## 2025-02-21 ENCOUNTER — APPOINTMENT (OUTPATIENT)
Dept: CARDIOLOGY | Facility: CLINIC | Age: 89
End: 2025-02-21
Payer: COMMERCIAL